# Patient Record
Sex: FEMALE | Race: WHITE | NOT HISPANIC OR LATINO | Employment: UNEMPLOYED | URBAN - METROPOLITAN AREA
[De-identification: names, ages, dates, MRNs, and addresses within clinical notes are randomized per-mention and may not be internally consistent; named-entity substitution may affect disease eponyms.]

---

## 2019-01-01 ENCOUNTER — APPOINTMENT (OUTPATIENT)
Dept: CARDIOLOGY | Facility: MEDICAL CENTER | Age: 64
DRG: 189 | End: 2019-01-01
Attending: STUDENT IN AN ORGANIZED HEALTH CARE EDUCATION/TRAINING PROGRAM
Payer: COMMERCIAL

## 2019-01-01 ENCOUNTER — HOSPITAL ENCOUNTER (OUTPATIENT)
Dept: LAB | Facility: MEDICAL CENTER | Age: 64
End: 2019-12-26
Attending: INTERNAL MEDICINE
Payer: COMMERCIAL

## 2019-01-01 ENCOUNTER — TELEPHONE (OUTPATIENT)
Dept: MEDICAL GROUP | Facility: PHYSICIAN GROUP | Age: 64
End: 2019-01-01

## 2019-01-01 ENCOUNTER — OFFICE VISIT (OUTPATIENT)
Dept: MEDICAL GROUP | Facility: PHYSICIAN GROUP | Age: 64
End: 2019-01-01
Payer: COMMERCIAL

## 2019-01-01 ENCOUNTER — APPOINTMENT (OUTPATIENT)
Dept: RADIOLOGY | Facility: MEDICAL CENTER | Age: 64
DRG: 189 | End: 2019-01-01
Payer: COMMERCIAL

## 2019-01-01 ENCOUNTER — APPOINTMENT (OUTPATIENT)
Dept: RADIOLOGY | Facility: MEDICAL CENTER | Age: 64
DRG: 189 | End: 2019-01-01
Attending: EMERGENCY MEDICINE
Payer: COMMERCIAL

## 2019-01-01 ENCOUNTER — HOSPITAL ENCOUNTER (OUTPATIENT)
Facility: MEDICAL CENTER | Age: 64
End: 2019-12-26
Attending: INTERNAL MEDICINE
Payer: COMMERCIAL

## 2019-01-01 ENCOUNTER — HOSPITAL ENCOUNTER (INPATIENT)
Facility: MEDICAL CENTER | Age: 64
LOS: 6 days | DRG: 189 | End: 2019-11-21
Attending: EMERGENCY MEDICINE | Admitting: INTERNAL MEDICINE
Payer: COMMERCIAL

## 2019-01-01 ENCOUNTER — TELEPHONE (OUTPATIENT)
Dept: URGENT CARE | Facility: PHYSICIAN GROUP | Age: 64
End: 2019-01-01

## 2019-01-01 VITALS
HEIGHT: 68 IN | BODY MASS INDEX: 31.14 KG/M2 | WEIGHT: 205.47 LBS | OXYGEN SATURATION: 91 % | RESPIRATION RATE: 18 BRPM | DIASTOLIC BLOOD PRESSURE: 62 MMHG | SYSTOLIC BLOOD PRESSURE: 102 MMHG | HEART RATE: 94 BPM | TEMPERATURE: 98.9 F

## 2019-01-01 VITALS
BODY MASS INDEX: 31.83 KG/M2 | SYSTOLIC BLOOD PRESSURE: 110 MMHG | TEMPERATURE: 97.7 F | WEIGHT: 210 LBS | DIASTOLIC BLOOD PRESSURE: 64 MMHG | OXYGEN SATURATION: 93 % | HEART RATE: 94 BPM | HEIGHT: 68 IN

## 2019-01-01 DIAGNOSIS — J98.4 CAVITATING MASS IN LEFT LOWER LUNG LOBE: ICD-10-CM

## 2019-01-01 DIAGNOSIS — R09.02 HYPOXIA: ICD-10-CM

## 2019-01-01 DIAGNOSIS — Z23 NEED FOR VACCINATION: ICD-10-CM

## 2019-01-01 DIAGNOSIS — R79.89 LOW VITAMIN D LEVEL: ICD-10-CM

## 2019-01-01 DIAGNOSIS — J44.9 CHRONIC OBSTRUCTIVE PULMONARY DISEASE, UNSPECIFIED COPD TYPE (HCC): ICD-10-CM

## 2019-01-01 DIAGNOSIS — R79.89 ELEVATED BRAIN NATRIURETIC PEPTIDE (BNP) LEVEL: ICD-10-CM

## 2019-01-01 DIAGNOSIS — Z12.11 SCREEN FOR COLON CANCER: ICD-10-CM

## 2019-01-01 DIAGNOSIS — D53.9 MACROCYTIC ANEMIA: ICD-10-CM

## 2019-01-01 DIAGNOSIS — R73.9 HYPERGLYCEMIA: ICD-10-CM

## 2019-01-01 DIAGNOSIS — C34.92 PRIMARY MALIGNANT NEOPLASM OF LEFT LUNG METASTATIC TO OTHER SITE (HCC): ICD-10-CM

## 2019-01-01 DIAGNOSIS — Z13.1 DIABETES MELLITUS SCREENING: ICD-10-CM

## 2019-01-01 DIAGNOSIS — J96.01 ACUTE RESPIRATORY FAILURE WITH HYPOXIA (HCC): ICD-10-CM

## 2019-01-01 DIAGNOSIS — J43.8 OTHER EMPHYSEMA (HCC): ICD-10-CM

## 2019-01-01 DIAGNOSIS — I27.20 PULMONARY HTN (HCC): ICD-10-CM

## 2019-01-01 LAB
25(OH)D3 SERPL-MCNC: 23 NG/ML (ref 30–100)
25(OH)D3 SERPL-MCNC: 25 NG/ML (ref 30–100)
ALBUMIN SERPL BCP-MCNC: 3.6 G/DL (ref 3.2–4.9)
ALBUMIN SERPL BCP-MCNC: 3.7 G/DL (ref 3.2–4.9)
ALBUMIN/GLOB SERPL: 1.2 G/DL
ALBUMIN/GLOB SERPL: 1.4 G/DL
ALP SERPL-CCNC: 69 U/L (ref 30–99)
ALP SERPL-CCNC: 70 U/L (ref 30–99)
ALT SERPL-CCNC: 15 U/L (ref 2–50)
ALT SERPL-CCNC: 15 U/L (ref 2–50)
ANION GAP SERPL CALC-SCNC: 10 MMOL/L (ref 0–11.9)
ANION GAP SERPL CALC-SCNC: 12 MMOL/L (ref 0–11.9)
ANION GAP SERPL CALC-SCNC: 5 MMOL/L (ref 0–11.9)
ANION GAP SERPL CALC-SCNC: 7 MMOL/L (ref 0–11.9)
ANION GAP SERPL CALC-SCNC: 7 MMOL/L (ref 0–11.9)
ANION GAP SERPL CALC-SCNC: 9 MMOL/L (ref 0–11.9)
ANION GAP SERPL CALC-SCNC: 9 MMOL/L (ref 0–11.9)
ANISOCYTOSIS BLD QL SMEAR: ABNORMAL
ANISOCYTOSIS BLD QL SMEAR: ABNORMAL
AST SERPL-CCNC: 16 U/L (ref 12–45)
AST SERPL-CCNC: 24 U/L (ref 12–45)
BACTERIA BLD CULT: NORMAL
BACTERIA BLD CULT: NORMAL
BASO STIPL BLD QL SMEAR: NORMAL
BASOPHILS # BLD AUTO: 0 % (ref 0–1.8)
BASOPHILS # BLD AUTO: 0.4 % (ref 0–1.8)
BASOPHILS # BLD AUTO: 0.5 % (ref 0–1.8)
BASOPHILS # BLD AUTO: 0.7 % (ref 0–1.8)
BASOPHILS # BLD AUTO: 1.7 % (ref 0–1.8)
BASOPHILS # BLD: 0 K/UL (ref 0–0.12)
BASOPHILS # BLD: 0.02 K/UL (ref 0–0.12)
BASOPHILS # BLD: 0.02 K/UL (ref 0–0.12)
BASOPHILS # BLD: 0.04 K/UL (ref 0–0.12)
BASOPHILS # BLD: 0.08 K/UL (ref 0–0.12)
BILIRUB SERPL-MCNC: 0.4 MG/DL (ref 0.1–1.5)
BILIRUB SERPL-MCNC: 0.4 MG/DL (ref 0.1–1.5)
BUN SERPL-MCNC: 11 MG/DL (ref 8–22)
BUN SERPL-MCNC: 11 MG/DL (ref 8–22)
BUN SERPL-MCNC: 15 MG/DL (ref 8–22)
BUN SERPL-MCNC: 16 MG/DL (ref 8–22)
BUN SERPL-MCNC: 19 MG/DL (ref 8–22)
BUN SERPL-MCNC: 26 MG/DL (ref 8–22)
BUN SERPL-MCNC: 9 MG/DL (ref 8–22)
CALCIUM SERPL-MCNC: 8.5 MG/DL (ref 8.5–10.5)
CALCIUM SERPL-MCNC: 8.6 MG/DL (ref 8.5–10.5)
CALCIUM SERPL-MCNC: 8.7 MG/DL (ref 8.5–10.5)
CALCIUM SERPL-MCNC: 9 MG/DL (ref 8.5–10.5)
CALCIUM SERPL-MCNC: 9 MG/DL (ref 8.5–10.5)
CALCIUM SERPL-MCNC: 9.5 MG/DL (ref 8.5–10.5)
CALCIUM SERPL-MCNC: 9.6 MG/DL (ref 8.5–10.5)
CHLORIDE SERPL-SCNC: 102 MMOL/L (ref 96–112)
CHLORIDE SERPL-SCNC: 103 MMOL/L (ref 96–112)
CHLORIDE SERPL-SCNC: 105 MMOL/L (ref 96–112)
CHLORIDE SERPL-SCNC: 106 MMOL/L (ref 96–112)
CHLORIDE SERPL-SCNC: 95 MMOL/L (ref 96–112)
CHLORIDE SERPL-SCNC: 97 MMOL/L (ref 96–112)
CHLORIDE SERPL-SCNC: 99 MMOL/L (ref 96–112)
CO2 SERPL-SCNC: 25 MMOL/L (ref 20–33)
CO2 SERPL-SCNC: 26 MMOL/L (ref 20–33)
CO2 SERPL-SCNC: 29 MMOL/L (ref 20–33)
CO2 SERPL-SCNC: 32 MMOL/L (ref 20–33)
CO2 SERPL-SCNC: 34 MMOL/L (ref 20–33)
CO2 SERPL-SCNC: 34 MMOL/L (ref 20–33)
CO2 SERPL-SCNC: 35 MMOL/L (ref 20–33)
CREAT SERPL-MCNC: 0.55 MG/DL (ref 0.5–1.4)
CREAT SERPL-MCNC: 0.59 MG/DL (ref 0.5–1.4)
CREAT SERPL-MCNC: 0.6 MG/DL (ref 0.5–1.4)
CREAT SERPL-MCNC: 0.61 MG/DL (ref 0.5–1.4)
CREAT SERPL-MCNC: 0.65 MG/DL (ref 0.5–1.4)
CREAT SERPL-MCNC: 0.71 MG/DL (ref 0.5–1.4)
CREAT SERPL-MCNC: 0.74 MG/DL (ref 0.5–1.4)
EKG IMPRESSION: NORMAL
EOSINOPHIL # BLD AUTO: 0 K/UL (ref 0–0.51)
EOSINOPHIL # BLD AUTO: 0.01 K/UL (ref 0–0.51)
EOSINOPHIL # BLD AUTO: 0.01 K/UL (ref 0–0.51)
EOSINOPHIL NFR BLD: 0 % (ref 0–6.9)
EOSINOPHIL NFR BLD: 0.2 % (ref 0–6.9)
EOSINOPHIL NFR BLD: 0.2 % (ref 0–6.9)
ERYTHROCYTE [DISTWIDTH] IN BLOOD BY AUTOMATED COUNT: 54.6 FL (ref 35.9–50)
ERYTHROCYTE [DISTWIDTH] IN BLOOD BY AUTOMATED COUNT: 58.1 FL (ref 35.9–50)
ERYTHROCYTE [DISTWIDTH] IN BLOOD BY AUTOMATED COUNT: 58.2 FL (ref 35.9–50)
ERYTHROCYTE [DISTWIDTH] IN BLOOD BY AUTOMATED COUNT: 61.1 FL (ref 35.9–50)
ERYTHROCYTE [DISTWIDTH] IN BLOOD BY AUTOMATED COUNT: 66.8 FL (ref 35.9–50)
ERYTHROCYTE [DISTWIDTH] IN BLOOD BY AUTOMATED COUNT: 67.3 FL (ref 35.9–50)
EST. AVERAGE GLUCOSE BLD GHB EST-MCNC: 88 MG/DL
EST. AVERAGE GLUCOSE BLD GHB EST-MCNC: 97 MG/DL
FLUAV RNA SPEC QL NAA+PROBE: NEGATIVE
FLUBV RNA SPEC QL NAA+PROBE: NEGATIVE
FOLATE SERPL-MCNC: 10.4 NG/ML
FOLATE SERPL-MCNC: 10.7 NG/ML
GLOBULIN SER CALC-MCNC: 2.5 G/DL (ref 1.9–3.5)
GLOBULIN SER CALC-MCNC: 3.1 G/DL (ref 1.9–3.5)
GLUCOSE SERPL-MCNC: 105 MG/DL (ref 65–99)
GLUCOSE SERPL-MCNC: 106 MG/DL (ref 65–99)
GLUCOSE SERPL-MCNC: 106 MG/DL (ref 65–99)
GLUCOSE SERPL-MCNC: 121 MG/DL (ref 65–99)
GLUCOSE SERPL-MCNC: 124 MG/DL (ref 65–99)
GLUCOSE SERPL-MCNC: 144 MG/DL (ref 65–99)
GLUCOSE SERPL-MCNC: 98 MG/DL (ref 65–99)
HBA1C MFR BLD: 4.7 % (ref 0–5.6)
HBA1C MFR BLD: 5 % (ref 0–5.6)
HCT VFR BLD AUTO: 24.2 % (ref 37–47)
HCT VFR BLD AUTO: 24.7 % (ref 37–47)
HCT VFR BLD AUTO: 25.2 % (ref 37–47)
HCT VFR BLD AUTO: 26.9 % (ref 37–47)
HCT VFR BLD AUTO: 30.9 % (ref 37–47)
HCT VFR BLD AUTO: 30.9 % (ref 37–47)
HGB BLD-MCNC: 10.1 G/DL (ref 12–16)
HGB BLD-MCNC: 7.7 G/DL (ref 12–16)
HGB BLD-MCNC: 7.9 G/DL (ref 12–16)
HGB BLD-MCNC: 8 G/DL (ref 12–16)
HGB BLD-MCNC: 8.5 G/DL (ref 12–16)
HGB BLD-MCNC: 9.9 G/DL (ref 12–16)
HGB RETIC QN AUTO: 30 PG/CELL (ref 29–35)
HGB RETIC QN AUTO: 30.4 PG/CELL (ref 29–35)
IMM GRANULOCYTES # BLD AUTO: 0.02 K/UL (ref 0–0.11)
IMM GRANULOCYTES # BLD AUTO: 0.03 K/UL (ref 0–0.11)
IMM GRANULOCYTES # BLD AUTO: 0.07 K/UL (ref 0–0.11)
IMM GRANULOCYTES NFR BLD AUTO: 0.5 % (ref 0–0.9)
IMM GRANULOCYTES NFR BLD AUTO: 0.5 % (ref 0–0.9)
IMM GRANULOCYTES NFR BLD AUTO: 1.3 % (ref 0–0.9)
IMM RETICS NFR: 18.8 % (ref 9.3–17.4)
IMM RETICS NFR: 25.7 % (ref 9.3–17.4)
LACTATE BLD-SCNC: 1.6 MMOL/L (ref 0.5–2)
LV EJECT FRACT MOD 2C 99903: 56.42
LV EJECT FRACT MOD 4C 99902: 60.74
LV EJECT FRACT MOD BP 99901: 56.84
LYMPHOCYTES # BLD AUTO: 0.65 K/UL (ref 1–4.8)
LYMPHOCYTES # BLD AUTO: 0.79 K/UL (ref 1–4.8)
LYMPHOCYTES # BLD AUTO: 0.81 K/UL (ref 1–4.8)
LYMPHOCYTES # BLD AUTO: 0.85 K/UL (ref 1–4.8)
LYMPHOCYTES # BLD AUTO: 0.9 K/UL (ref 1–4.8)
LYMPHOCYTES NFR BLD: 11.6 % (ref 22–41)
LYMPHOCYTES NFR BLD: 12.9 % (ref 22–41)
LYMPHOCYTES NFR BLD: 14.5 % (ref 22–41)
LYMPHOCYTES NFR BLD: 17.4 % (ref 22–41)
LYMPHOCYTES NFR BLD: 18.9 % (ref 22–41)
MANUAL DIFF BLD: NORMAL
MANUAL DIFF BLD: NORMAL
MCH RBC QN AUTO: 32 PG (ref 27–33)
MCH RBC QN AUTO: 32 PG (ref 27–33)
MCH RBC QN AUTO: 32.1 PG (ref 27–33)
MCH RBC QN AUTO: 32.2 PG (ref 27–33)
MCH RBC QN AUTO: 32.5 PG (ref 27–33)
MCH RBC QN AUTO: 32.5 PG (ref 27–33)
MCHC RBC AUTO-ENTMCNC: 31.2 G/DL (ref 33.6–35)
MCHC RBC AUTO-ENTMCNC: 31.6 G/DL (ref 33.6–35)
MCHC RBC AUTO-ENTMCNC: 31.7 G/DL (ref 33.6–35)
MCHC RBC AUTO-ENTMCNC: 32 G/DL (ref 33.6–35)
MCHC RBC AUTO-ENTMCNC: 32.6 G/DL (ref 33.6–35)
MCHC RBC AUTO-ENTMCNC: 32.7 G/DL (ref 33.6–35)
MCV RBC AUTO: 100.7 FL (ref 81.4–97.8)
MCV RBC AUTO: 100.8 FL (ref 81.4–97.8)
MCV RBC AUTO: 101.1 FL (ref 81.4–97.8)
MCV RBC AUTO: 102.9 FL (ref 81.4–97.8)
MCV RBC AUTO: 99.4 FL (ref 81.4–97.8)
MCV RBC AUTO: 99.6 FL (ref 81.4–97.8)
MICROCYTES BLD QL SMEAR: ABNORMAL
MONOCYTES # BLD AUTO: 0.22 K/UL (ref 0–0.85)
MONOCYTES # BLD AUTO: 0.37 K/UL (ref 0–0.85)
MONOCYTES # BLD AUTO: 0.41 K/UL (ref 0–0.85)
MONOCYTES # BLD AUTO: 0.45 K/UL (ref 0–0.85)
MONOCYTES # BLD AUTO: 0.54 K/UL (ref 0–0.85)
MONOCYTES NFR BLD AUTO: 4.4 % (ref 0–13.4)
MONOCYTES NFR BLD AUTO: 6.4 % (ref 0–13.4)
MONOCYTES NFR BLD AUTO: 6.6 % (ref 0–13.4)
MONOCYTES NFR BLD AUTO: 9.6 % (ref 0–13.4)
MONOCYTES NFR BLD AUTO: 9.8 % (ref 0–13.4)
MORPHOLOGY BLD-IMP: NORMAL
MORPHOLOGY BLD-IMP: NORMAL
NEUTROPHILS # BLD AUTO: 2.94 K/UL (ref 2–7.15)
NEUTROPHILS # BLD AUTO: 3.75 K/UL (ref 2–7.15)
NEUTROPHILS # BLD AUTO: 4.15 K/UL (ref 2–7.15)
NEUTROPHILS # BLD AUTO: 4.52 K/UL (ref 2–7.15)
NEUTROPHILS # BLD AUTO: 5.65 K/UL (ref 2–7.15)
NEUTROPHILS NFR BLD: 70.3 % (ref 44–72)
NEUTROPHILS NFR BLD: 74 % (ref 44–72)
NEUTROPHILS NFR BLD: 76.5 % (ref 44–72)
NEUTROPHILS NFR BLD: 80.4 % (ref 44–72)
NEUTROPHILS NFR BLD: 80.7 % (ref 44–72)
NRBC # BLD AUTO: 0 K/UL
NRBC # BLD AUTO: 0.04 K/UL
NRBC BLD-RTO: 0 /100 WBC
NRBC BLD-RTO: 0.7 /100 WBC
NT-PROBNP SERPL IA-MCNC: 235 PG/ML (ref 0–125)
PLATELET # BLD AUTO: 309 K/UL (ref 164–446)
PLATELET # BLD AUTO: 324 K/UL (ref 164–446)
PLATELET # BLD AUTO: 333 K/UL (ref 164–446)
PLATELET # BLD AUTO: 373 K/UL (ref 164–446)
PLATELET # BLD AUTO: 430 K/UL (ref 164–446)
PLATELET # BLD AUTO: 448 K/UL (ref 164–446)
PLATELET BLD QL SMEAR: NORMAL
PLATELET BLD QL SMEAR: NORMAL
PMV BLD AUTO: 9 FL (ref 9–12.9)
PMV BLD AUTO: 9.2 FL (ref 9–12.9)
POLYCHROMASIA BLD QL SMEAR: NORMAL
POLYCHROMASIA BLD QL SMEAR: NORMAL
POTASSIUM SERPL-SCNC: 3.5 MMOL/L (ref 3.6–5.5)
POTASSIUM SERPL-SCNC: 3.8 MMOL/L (ref 3.6–5.5)
POTASSIUM SERPL-SCNC: 3.8 MMOL/L (ref 3.6–5.5)
POTASSIUM SERPL-SCNC: 3.9 MMOL/L (ref 3.6–5.5)
POTASSIUM SERPL-SCNC: 3.9 MMOL/L (ref 3.6–5.5)
POTASSIUM SERPL-SCNC: 4.4 MMOL/L (ref 3.6–5.5)
POTASSIUM SERPL-SCNC: 4.4 MMOL/L (ref 3.6–5.5)
PROCALCITONIN SERPL-MCNC: 0.05 NG/ML
PROCALCITONIN SERPL-MCNC: <0.05 NG/ML
PROT SERPL-MCNC: 6.1 G/DL (ref 6–8.2)
PROT SERPL-MCNC: 6.8 G/DL (ref 6–8.2)
RBC # BLD AUTO: 2.4 M/UL (ref 4.2–5.4)
RBC # BLD AUTO: 2.43 M/UL (ref 4.2–5.4)
RBC # BLD AUTO: 2.5 M/UL (ref 4.2–5.4)
RBC # BLD AUTO: 2.66 M/UL (ref 4.2–5.4)
RBC # BLD AUTO: 3.07 M/UL (ref 4.2–5.4)
RBC # BLD AUTO: 3.11 M/UL (ref 4.2–5.4)
RBC BLD AUTO: PRESENT
RBC BLD AUTO: PRESENT
RETICS # AUTO: 0.06 M/UL (ref 0.04–0.06)
RETICS # AUTO: 0.06 M/UL (ref 0.04–0.06)
RETICS/RBC NFR: 1.7 % (ref 0.8–2.1)
RETICS/RBC NFR: 1.9 % (ref 0.8–2.1)
SIGNIFICANT IND 70042: NORMAL
SIGNIFICANT IND 70042: NORMAL
SITE SITE: NORMAL
SITE SITE: NORMAL
SODIUM SERPL-SCNC: 138 MMOL/L (ref 135–145)
SODIUM SERPL-SCNC: 139 MMOL/L (ref 135–145)
SODIUM SERPL-SCNC: 140 MMOL/L (ref 135–145)
SODIUM SERPL-SCNC: 140 MMOL/L (ref 135–145)
SODIUM SERPL-SCNC: 141 MMOL/L (ref 135–145)
SODIUM SERPL-SCNC: 141 MMOL/L (ref 135–145)
SODIUM SERPL-SCNC: 142 MMOL/L (ref 135–145)
SOURCE SOURCE: NORMAL
SOURCE SOURCE: NORMAL
T4 SERPL-MCNC: 6.9 UG/DL (ref 4–12)
TSH SERPL DL<=0.005 MIU/L-ACNC: 1.33 UIU/ML (ref 0.38–5.33)
VIT B12 SERPL-MCNC: 309 PG/ML (ref 211–911)
VIT B12 SERPL-MCNC: 354 PG/ML (ref 211–911)
WBC # BLD AUTO: 4.2 K/UL (ref 4.8–10.8)
WBC # BLD AUTO: 4.9 K/UL (ref 4.8–10.8)
WBC # BLD AUTO: 5 K/UL (ref 4.8–10.8)
WBC # BLD AUTO: 5.6 K/UL (ref 4.8–10.8)
WBC # BLD AUTO: 5.6 K/UL (ref 4.8–10.8)
WBC # BLD AUTO: 7 K/UL (ref 4.8–10.8)

## 2019-01-01 PROCEDURE — A9270 NON-COVERED ITEM OR SERVICE: HCPCS | Performed by: STUDENT IN AN ORGANIZED HEALTH CARE EDUCATION/TRAINING PROGRAM

## 2019-01-01 PROCEDURE — A9270 NON-COVERED ITEM OR SERVICE: HCPCS | Performed by: INTERNAL MEDICINE

## 2019-01-01 PROCEDURE — 99232 SBSQ HOSP IP/OBS MODERATE 35: CPT | Performed by: INTERNAL MEDICINE

## 2019-01-01 PROCEDURE — 99285 EMERGENCY DEPT VISIT HI MDM: CPT

## 2019-01-01 PROCEDURE — 93306 TTE W/DOPPLER COMPLETE: CPT | Mod: 26 | Performed by: INTERNAL MEDICINE

## 2019-01-01 PROCEDURE — 90472 IMMUNIZATION ADMIN EACH ADD: CPT | Performed by: INTERNAL MEDICINE

## 2019-01-01 PROCEDURE — 85025 COMPLETE CBC W/AUTO DIFF WBC: CPT

## 2019-01-01 PROCEDURE — 90732 PPSV23 VACC 2 YRS+ SUBQ/IM: CPT | Performed by: INTERNAL MEDICINE

## 2019-01-01 PROCEDURE — 770004 HCHG ROOM/CARE - ONCOLOGY PRIVATE *

## 2019-01-01 PROCEDURE — 82746 ASSAY OF FOLIC ACID SERUM: CPT

## 2019-01-01 PROCEDURE — 700111 HCHG RX REV CODE 636 W/ 250 OVERRIDE (IP): Performed by: INTERNAL MEDICINE

## 2019-01-01 PROCEDURE — 700117 HCHG RX CONTRAST REV CODE 255: Performed by: EMERGENCY MEDICINE

## 2019-01-01 PROCEDURE — 83036 HEMOGLOBIN GLYCOSYLATED A1C: CPT | Mod: 91

## 2019-01-01 PROCEDURE — 36415 COLL VENOUS BLD VENIPUNCTURE: CPT

## 2019-01-01 PROCEDURE — 99204 OFFICE O/P NEW MOD 45 MIN: CPT | Mod: 25 | Performed by: INTERNAL MEDICINE

## 2019-01-01 PROCEDURE — 700111 HCHG RX REV CODE 636 W/ 250 OVERRIDE (IP): Performed by: STUDENT IN AN ORGANIZED HEALTH CARE EDUCATION/TRAINING PROGRAM

## 2019-01-01 PROCEDURE — 84145 PROCALCITONIN (PCT): CPT

## 2019-01-01 PROCEDURE — 96376 TX/PRO/DX INJ SAME DRUG ADON: CPT

## 2019-01-01 PROCEDURE — 93306 TTE W/DOPPLER COMPLETE: CPT

## 2019-01-01 PROCEDURE — 90686 IIV4 VACC NO PRSV 0.5 ML IM: CPT | Performed by: INTERNAL MEDICINE

## 2019-01-01 PROCEDURE — 83880 ASSAY OF NATRIURETIC PEPTIDE: CPT

## 2019-01-01 PROCEDURE — 94664 DEMO&/EVAL PT USE INHALER: CPT

## 2019-01-01 PROCEDURE — 700101 HCHG RX REV CODE 250: Performed by: INTERNAL MEDICINE

## 2019-01-01 PROCEDURE — 84436 ASSAY OF TOTAL THYROXINE: CPT

## 2019-01-01 PROCEDURE — 80048 BASIC METABOLIC PNL TOTAL CA: CPT

## 2019-01-01 PROCEDURE — 94760 N-INVAS EAR/PLS OXIMETRY 1: CPT

## 2019-01-01 PROCEDURE — 99233 SBSQ HOSP IP/OBS HIGH 50: CPT | Performed by: STUDENT IN AN ORGANIZED HEALTH CARE EDUCATION/TRAINING PROGRAM

## 2019-01-01 PROCEDURE — 700102 HCHG RX REV CODE 250 W/ 637 OVERRIDE(OP): Performed by: STUDENT IN AN ORGANIZED HEALTH CARE EDUCATION/TRAINING PROGRAM

## 2019-01-01 PROCEDURE — 71045 X-RAY EXAM CHEST 1 VIEW: CPT

## 2019-01-01 PROCEDURE — 85007 BL SMEAR W/DIFF WBC COUNT: CPT

## 2019-01-01 PROCEDURE — 96374 THER/PROPH/DIAG INJ IV PUSH: CPT

## 2019-01-01 PROCEDURE — 85027 COMPLETE CBC AUTOMATED: CPT

## 2019-01-01 PROCEDURE — 80053 COMPREHEN METABOLIC PANEL: CPT

## 2019-01-01 PROCEDURE — 99239 HOSP IP/OBS DSCHRG MGMT >30: CPT | Performed by: INTERNAL MEDICINE

## 2019-01-01 PROCEDURE — 700102 HCHG RX REV CODE 250 W/ 637 OVERRIDE(OP): Performed by: INTERNAL MEDICINE

## 2019-01-01 PROCEDURE — 96375 TX/PRO/DX INJ NEW DRUG ADDON: CPT

## 2019-01-01 PROCEDURE — 71275 CT ANGIOGRAPHY CHEST: CPT

## 2019-01-01 PROCEDURE — 82306 VITAMIN D 25 HYDROXY: CPT

## 2019-01-01 PROCEDURE — 82607 VITAMIN B-12: CPT

## 2019-01-01 PROCEDURE — 99223 1ST HOSP IP/OBS HIGH 75: CPT | Mod: AI | Performed by: INTERNAL MEDICINE

## 2019-01-01 PROCEDURE — 83036 HEMOGLOBIN GLYCOSYLATED A1C: CPT

## 2019-01-01 PROCEDURE — 82306 VITAMIN D 25 HYDROXY: CPT | Mod: 91

## 2019-01-01 PROCEDURE — 85046 RETICYTE/HGB CONCENTRATE: CPT

## 2019-01-01 PROCEDURE — 87040 BLOOD CULTURE FOR BACTERIA: CPT

## 2019-01-01 PROCEDURE — 82746 ASSAY OF FOLIC ACID SERUM: CPT | Mod: 91

## 2019-01-01 PROCEDURE — 90471 IMMUNIZATION ADMIN: CPT | Performed by: INTERNAL MEDICINE

## 2019-01-01 PROCEDURE — 99233 SBSQ HOSP IP/OBS HIGH 50: CPT | Performed by: INTERNAL MEDICINE

## 2019-01-01 PROCEDURE — 700111 HCHG RX REV CODE 636 W/ 250 OVERRIDE (IP): Performed by: EMERGENCY MEDICINE

## 2019-01-01 PROCEDURE — 93005 ELECTROCARDIOGRAM TRACING: CPT | Performed by: EMERGENCY MEDICINE

## 2019-01-01 PROCEDURE — 85046 RETICYTE/HGB CONCENTRATE: CPT | Mod: 91

## 2019-01-01 PROCEDURE — 84443 ASSAY THYROID STIM HORMONE: CPT

## 2019-01-01 PROCEDURE — 83605 ASSAY OF LACTIC ACID: CPT

## 2019-01-01 PROCEDURE — 82607 VITAMIN B-12: CPT | Mod: 91

## 2019-01-01 PROCEDURE — 87502 INFLUENZA DNA AMP PROBE: CPT

## 2019-01-01 RX ORDER — PROMETHAZINE HYDROCHLORIDE 25 MG/1
12.5-25 SUPPOSITORY RECTAL EVERY 4 HOURS PRN
Status: DISCONTINUED | OUTPATIENT
Start: 2019-01-01 | End: 2019-01-01 | Stop reason: HOSPADM

## 2019-01-01 RX ORDER — BISACODYL 10 MG
10 SUPPOSITORY, RECTAL RECTAL ONCE
Status: COMPLETED | OUTPATIENT
Start: 2019-01-01 | End: 2019-01-01

## 2019-01-01 RX ORDER — MORPHINE SULFATE 4 MG/ML
4 INJECTION, SOLUTION INTRAMUSCULAR; INTRAVENOUS ONCE
Status: COMPLETED | OUTPATIENT
Start: 2019-01-01 | End: 2019-01-01

## 2019-01-01 RX ORDER — TRAMADOL HYDROCHLORIDE 50 MG/1
50 TABLET ORAL EVERY 4 HOURS PRN
COMMUNITY
End: 2019-01-01

## 2019-01-01 RX ORDER — ONDANSETRON 4 MG/1
4 TABLET, ORALLY DISINTEGRATING ORAL EVERY 4 HOURS PRN
Status: DISCONTINUED | OUTPATIENT
Start: 2019-01-01 | End: 2019-01-01 | Stop reason: HOSPADM

## 2019-01-01 RX ORDER — ENALAPRILAT 1.25 MG/ML
1.25 INJECTION INTRAVENOUS EVERY 6 HOURS PRN
Status: DISCONTINUED | OUTPATIENT
Start: 2019-01-01 | End: 2019-01-01 | Stop reason: HOSPADM

## 2019-01-01 RX ORDER — PROCHLORPERAZINE EDISYLATE 5 MG/ML
5-10 INJECTION INTRAMUSCULAR; INTRAVENOUS EVERY 4 HOURS PRN
Status: DISCONTINUED | OUTPATIENT
Start: 2019-01-01 | End: 2019-01-01 | Stop reason: HOSPADM

## 2019-01-01 RX ORDER — FUROSEMIDE 10 MG/ML
40 INJECTION INTRAMUSCULAR; INTRAVENOUS
Status: DISCONTINUED | OUTPATIENT
Start: 2019-01-01 | End: 2019-01-01

## 2019-01-01 RX ORDER — ONDANSETRON 2 MG/ML
4 INJECTION INTRAMUSCULAR; INTRAVENOUS ONCE
Status: COMPLETED | OUTPATIENT
Start: 2019-01-01 | End: 2019-01-01

## 2019-01-01 RX ORDER — FUROSEMIDE 10 MG/ML
20 INJECTION INTRAMUSCULAR; INTRAVENOUS
Status: DISCONTINUED | OUTPATIENT
Start: 2019-01-01 | End: 2019-01-01

## 2019-01-01 RX ORDER — OXYCODONE HYDROCHLORIDE 5 MG/1
5 TABLET ORAL
Status: DISCONTINUED | OUTPATIENT
Start: 2019-01-01 | End: 2019-01-01 | Stop reason: HOSPADM

## 2019-01-01 RX ORDER — POLYETHYLENE GLYCOL 3350 17 G/17G
1 POWDER, FOR SOLUTION ORAL
Status: DISCONTINUED | OUTPATIENT
Start: 2019-01-01 | End: 2019-01-01 | Stop reason: HOSPADM

## 2019-01-01 RX ORDER — OXYCODONE HYDROCHLORIDE 10 MG/1
10 TABLET ORAL
Status: DISCONTINUED | OUTPATIENT
Start: 2019-01-01 | End: 2019-01-01 | Stop reason: HOSPADM

## 2019-01-01 RX ORDER — OXYCODONE HYDROCHLORIDE 5 MG/1
5 TABLET ORAL EVERY 6 HOURS PRN
COMMUNITY
End: 2020-01-01

## 2019-01-01 RX ORDER — BISACODYL 10 MG
10 SUPPOSITORY, RECTAL RECTAL
Status: DISCONTINUED | OUTPATIENT
Start: 2019-01-01 | End: 2019-01-01 | Stop reason: HOSPADM

## 2019-01-01 RX ORDER — HYDROMORPHONE HYDROCHLORIDE 1 MG/ML
0.5 INJECTION, SOLUTION INTRAMUSCULAR; INTRAVENOUS; SUBCUTANEOUS
Status: DISCONTINUED | OUTPATIENT
Start: 2019-01-01 | End: 2019-01-01 | Stop reason: HOSPADM

## 2019-01-01 RX ORDER — CALCIUM CARBONATE 300MG(750)
800 TABLET,CHEWABLE ORAL DAILY
COMMUNITY

## 2019-01-01 RX ORDER — ONDANSETRON 2 MG/ML
4 INJECTION INTRAMUSCULAR; INTRAVENOUS EVERY 4 HOURS PRN
Status: DISCONTINUED | OUTPATIENT
Start: 2019-01-01 | End: 2019-01-01 | Stop reason: HOSPADM

## 2019-01-01 RX ORDER — PROMETHAZINE HYDROCHLORIDE 25 MG/1
12.5-25 TABLET ORAL EVERY 4 HOURS PRN
Status: DISCONTINUED | OUTPATIENT
Start: 2019-01-01 | End: 2019-01-01 | Stop reason: HOSPADM

## 2019-01-01 RX ORDER — AMOXICILLIN 250 MG
2 CAPSULE ORAL 2 TIMES DAILY
Status: DISCONTINUED | OUTPATIENT
Start: 2019-01-01 | End: 2019-01-01 | Stop reason: HOSPADM

## 2019-01-01 RX ORDER — TIOTROPIUM BROMIDE 18 UG/1
18 CAPSULE ORAL; RESPIRATORY (INHALATION) DAILY
COMMUNITY
End: 2020-01-01

## 2019-01-01 RX ORDER — GUAIFENESIN/DEXTROMETHORPHAN 100-10MG/5
10 SYRUP ORAL EVERY 6 HOURS PRN
Status: DISCONTINUED | OUTPATIENT
Start: 2019-01-01 | End: 2019-01-01 | Stop reason: HOSPADM

## 2019-01-01 RX ORDER — ACETAMINOPHEN 325 MG/1
650 TABLET ORAL EVERY 6 HOURS PRN
Status: DISCONTINUED | OUTPATIENT
Start: 2019-01-01 | End: 2019-01-01 | Stop reason: HOSPADM

## 2019-01-01 RX ORDER — POTASSIUM CHLORIDE 20 MEQ/1
20 TABLET, EXTENDED RELEASE ORAL 2 TIMES DAILY
Status: DISCONTINUED | OUTPATIENT
Start: 2019-01-01 | End: 2019-01-01 | Stop reason: HOSPADM

## 2019-01-01 RX ORDER — SODIUM CHLORIDE AND POTASSIUM CHLORIDE 150; 900 MG/100ML; MG/100ML
INJECTION, SOLUTION INTRAVENOUS CONTINUOUS
Status: DISCONTINUED | OUTPATIENT
Start: 2019-01-01 | End: 2019-01-01

## 2019-01-01 RX ADMIN — FUROSEMIDE 20 MG: 10 INJECTION, SOLUTION INTRAVENOUS at 06:58

## 2019-01-01 RX ADMIN — SENNOSIDES AND DOCUSATE SODIUM 2 TABLET: 8.6; 5 TABLET ORAL at 05:51

## 2019-01-01 RX ADMIN — FUROSEMIDE 20 MG: 10 INJECTION, SOLUTION INTRAVENOUS at 10:56

## 2019-01-01 RX ADMIN — POTASSIUM CHLORIDE 20 MEQ: 20 TABLET, EXTENDED RELEASE ORAL at 18:25

## 2019-01-01 RX ADMIN — LIDOCAINE HYDROCHLORIDE 15 ML: 20 SOLUTION OROPHARYNGEAL at 13:19

## 2019-01-01 RX ADMIN — MORPHINE SULFATE 4 MG: 4 INJECTION INTRAVENOUS at 19:23

## 2019-01-01 RX ADMIN — POTASSIUM CHLORIDE AND SODIUM CHLORIDE: 900; 150 INJECTION, SOLUTION INTRAVENOUS at 21:14

## 2019-01-01 RX ADMIN — OXYCODONE HYDROCHLORIDE 10 MG: 10 TABLET ORAL at 08:57

## 2019-01-01 RX ADMIN — ENOXAPARIN SODIUM 40 MG: 100 INJECTION SUBCUTANEOUS at 05:55

## 2019-01-01 RX ADMIN — POTASSIUM CHLORIDE 20 MEQ: 20 TABLET, EXTENDED RELEASE ORAL at 17:02

## 2019-01-01 RX ADMIN — LIDOCAINE HYDROCHLORIDE 15 ML: 20 SOLUTION OROPHARYNGEAL at 15:56

## 2019-01-01 RX ADMIN — BISACODYL 10 MG: 10 SUPPOSITORY RECTAL at 18:26

## 2019-01-01 RX ADMIN — OXYCODONE HYDROCHLORIDE 10 MG: 10 TABLET ORAL at 05:35

## 2019-01-01 RX ADMIN — SENNOSIDES AND DOCUSATE SODIUM 2 TABLET: 8.6; 5 TABLET ORAL at 18:20

## 2019-01-01 RX ADMIN — OXYCODONE HYDROCHLORIDE 10 MG: 10 TABLET ORAL at 05:50

## 2019-01-01 RX ADMIN — SENNOSIDES AND DOCUSATE SODIUM 2 TABLET: 8.6; 5 TABLET ORAL at 05:36

## 2019-01-01 RX ADMIN — FUROSEMIDE 40 MG: 10 INJECTION, SOLUTION INTRAMUSCULAR; INTRAVENOUS at 18:21

## 2019-01-01 RX ADMIN — OXYCODONE HYDROCHLORIDE 5 MG: 5 TABLET ORAL at 00:52

## 2019-01-01 RX ADMIN — OXYCODONE HYDROCHLORIDE 10 MG: 10 TABLET ORAL at 17:02

## 2019-01-01 RX ADMIN — SENNOSIDES AND DOCUSATE SODIUM 2 TABLET: 8.6; 5 TABLET ORAL at 21:51

## 2019-01-01 RX ADMIN — FUROSEMIDE 40 MG: 10 INJECTION, SOLUTION INTRAMUSCULAR; INTRAVENOUS at 06:13

## 2019-01-01 RX ADMIN — ENOXAPARIN SODIUM 40 MG: 100 INJECTION SUBCUTANEOUS at 06:00

## 2019-01-01 RX ADMIN — LIDOCAINE HYDROCHLORIDE 15 ML: 20 SOLUTION OROPHARYNGEAL at 08:43

## 2019-01-01 RX ADMIN — SENNOSIDES AND DOCUSATE SODIUM 2 TABLET: 8.6; 5 TABLET ORAL at 18:04

## 2019-01-01 RX ADMIN — OXYCODONE HYDROCHLORIDE 10 MG: 10 TABLET ORAL at 08:47

## 2019-01-01 RX ADMIN — OXYCODONE HYDROCHLORIDE 10 MG: 10 TABLET ORAL at 06:13

## 2019-01-01 RX ADMIN — OXYCODONE HYDROCHLORIDE 10 MG: 10 TABLET ORAL at 21:52

## 2019-01-01 RX ADMIN — OXYCODONE HYDROCHLORIDE 10 MG: 10 TABLET ORAL at 16:15

## 2019-01-01 RX ADMIN — FUROSEMIDE 20 MG: 10 INJECTION, SOLUTION INTRAVENOUS at 16:29

## 2019-01-01 RX ADMIN — SENNOSIDES AND DOCUSATE SODIUM 2 TABLET: 8.6; 5 TABLET ORAL at 23:19

## 2019-01-01 RX ADMIN — POTASSIUM CHLORIDE 20 MEQ: 20 TABLET, EXTENDED RELEASE ORAL at 05:42

## 2019-01-01 RX ADMIN — LIDOCAINE HYDROCHLORIDE 15 ML: 20 SOLUTION OROPHARYNGEAL at 17:42

## 2019-01-01 RX ADMIN — SENNOSIDES AND DOCUSATE SODIUM 2 TABLET: 8.6; 5 TABLET ORAL at 05:42

## 2019-01-01 RX ADMIN — OXYCODONE HYDROCHLORIDE 10 MG: 10 TABLET ORAL at 14:16

## 2019-01-01 RX ADMIN — OXYCODONE HYDROCHLORIDE 10 MG: 10 TABLET ORAL at 21:25

## 2019-01-01 RX ADMIN — ENOXAPARIN SODIUM 40 MG: 100 INJECTION SUBCUTANEOUS at 06:59

## 2019-01-01 RX ADMIN — OXYCODONE HYDROCHLORIDE 10 MG: 10 TABLET ORAL at 18:03

## 2019-01-01 RX ADMIN — OXYCODONE HYDROCHLORIDE 10 MG: 10 TABLET ORAL at 10:56

## 2019-01-01 RX ADMIN — FUROSEMIDE 40 MG: 10 INJECTION, SOLUTION INTRAMUSCULAR; INTRAVENOUS at 05:43

## 2019-01-01 RX ADMIN — OXYCODONE HYDROCHLORIDE 10 MG: 10 TABLET ORAL at 12:54

## 2019-01-01 RX ADMIN — POLYETHYLENE GLYCOL 3350 1 PACKET: 17 POWDER, FOR SOLUTION ORAL at 06:13

## 2019-01-01 RX ADMIN — ONDANSETRON 4 MG: 2 INJECTION INTRAMUSCULAR; INTRAVENOUS at 16:42

## 2019-01-01 RX ADMIN — SENNOSIDES AND DOCUSATE SODIUM 2 TABLET: 8.6; 5 TABLET ORAL at 17:42

## 2019-01-01 RX ADMIN — FUROSEMIDE 20 MG: 10 INJECTION, SOLUTION INTRAVENOUS at 15:56

## 2019-01-01 RX ADMIN — OXYCODONE HYDROCHLORIDE 10 MG: 10 TABLET ORAL at 22:12

## 2019-01-01 RX ADMIN — MAGNESIUM HYDROXIDE 30 ML: 400 SUSPENSION ORAL at 08:47

## 2019-01-01 RX ADMIN — OXYCODONE HYDROCHLORIDE 10 MG: 10 TABLET ORAL at 21:56

## 2019-01-01 RX ADMIN — PROCHLORPERAZINE EDISYLATE 10 MG: 5 INJECTION INTRAMUSCULAR; INTRAVENOUS at 06:33

## 2019-01-01 RX ADMIN — POTASSIUM CHLORIDE AND SODIUM CHLORIDE: 900; 150 INJECTION, SOLUTION INTRAVENOUS at 05:50

## 2019-01-01 RX ADMIN — SENNOSIDES AND DOCUSATE SODIUM 2 TABLET: 8.6; 5 TABLET ORAL at 06:13

## 2019-01-01 RX ADMIN — POTASSIUM CHLORIDE 20 MEQ: 20 TABLET, EXTENDED RELEASE ORAL at 06:00

## 2019-01-01 RX ADMIN — SENNOSIDES AND DOCUSATE SODIUM 2 TABLET: 8.6; 5 TABLET ORAL at 06:59

## 2019-01-01 RX ADMIN — POTASSIUM CHLORIDE 20 MEQ: 20 TABLET, EXTENDED RELEASE ORAL at 18:03

## 2019-01-01 RX ADMIN — POLYETHYLENE GLYCOL 3350 1 PACKET: 17 POWDER, FOR SOLUTION ORAL at 20:25

## 2019-01-01 RX ADMIN — POTASSIUM CHLORIDE 20 MEQ: 20 TABLET, EXTENDED RELEASE ORAL at 17:42

## 2019-01-01 RX ADMIN — POTASSIUM CHLORIDE 20 MEQ: 20 TABLET, EXTENDED RELEASE ORAL at 10:57

## 2019-01-01 RX ADMIN — SENNOSIDES AND DOCUSATE SODIUM 2 TABLET: 8.6; 5 TABLET ORAL at 17:03

## 2019-01-01 RX ADMIN — ENOXAPARIN SODIUM 40 MG: 100 INJECTION SUBCUTANEOUS at 05:37

## 2019-01-01 RX ADMIN — ENOXAPARIN SODIUM 40 MG: 100 INJECTION SUBCUTANEOUS at 05:42

## 2019-01-01 RX ADMIN — FUROSEMIDE 40 MG: 10 INJECTION, SOLUTION INTRAMUSCULAR; INTRAVENOUS at 05:37

## 2019-01-01 RX ADMIN — POTASSIUM CHLORIDE 20 MEQ: 20 TABLET, EXTENDED RELEASE ORAL at 18:21

## 2019-01-01 RX ADMIN — ENOXAPARIN SODIUM 40 MG: 100 INJECTION SUBCUTANEOUS at 06:13

## 2019-01-01 RX ADMIN — MORPHINE SULFATE 4 MG: 4 INJECTION INTRAVENOUS at 16:42

## 2019-01-01 RX ADMIN — OXYCODONE HYDROCHLORIDE 10 MG: 10 TABLET ORAL at 12:57

## 2019-01-01 RX ADMIN — POTASSIUM CHLORIDE 20 MEQ: 20 TABLET, EXTENDED RELEASE ORAL at 06:12

## 2019-01-01 RX ADMIN — FUROSEMIDE 40 MG: 10 INJECTION, SOLUTION INTRAMUSCULAR; INTRAVENOUS at 16:15

## 2019-01-01 RX ADMIN — ACETAMINOPHEN 650 MG: 325 TABLET, FILM COATED ORAL at 21:25

## 2019-01-01 RX ADMIN — LIDOCAINE HYDROCHLORIDE 15 ML: 20 SOLUTION OROPHARYNGEAL at 12:54

## 2019-01-01 RX ADMIN — IOHEXOL 60 ML: 350 INJECTION, SOLUTION INTRAVENOUS at 19:05

## 2019-01-01 RX ADMIN — POTASSIUM CHLORIDE 20 MEQ: 20 TABLET, EXTENDED RELEASE ORAL at 06:59

## 2019-01-01 RX ADMIN — OXYCODONE HYDROCHLORIDE 10 MG: 10 TABLET ORAL at 08:15

## 2019-01-01 RX ADMIN — POTASSIUM CHLORIDE 20 MEQ: 20 TABLET, EXTENDED RELEASE ORAL at 05:35

## 2019-01-01 SDOH — HEALTH STABILITY: MENTAL HEALTH: HOW OFTEN DO YOU HAVE A DRINK CONTAINING ALCOHOL?: NEVER

## 2019-01-01 ASSESSMENT — ENCOUNTER SYMPTOMS
COUGH: 0
CONSTIPATION: 0
CLAUDICATION: 0
STRIDOR: 0
NAUSEA: 0
MYALGIAS: 0
VOMITING: 0
MYALGIAS: 0
BLURRED VISION: 0
FEVER: 0
MYALGIAS: 0
HEARTBURN: 0
FEVER: 1
HEADACHES: 0
SPUTUM PRODUCTION: 1
HEADACHES: 0
PALPITATIONS: 0
DIZZINESS: 0
PALPITATIONS: 0
WEAKNESS: 0
ABDOMINAL PAIN: 0
CHILLS: 0
CHILLS: 0
SHORTNESS OF BREATH: 1
SENSORY CHANGE: 0
DEPRESSION: 0
COUGH: 1
FEVER: 1
NERVOUS/ANXIOUS: 0
TINGLING: 0
COUGH: 1
FEVER: 1
WEAKNESS: 0
DIZZINESS: 0
VOMITING: 0
DIZZINESS: 0
NERVOUS/ANXIOUS: 0
FALLS: 0
NAUSEA: 0
NERVOUS/ANXIOUS: 0
ABDOMINAL PAIN: 0
SORE THROAT: 1
SORE THROAT: 0
SENSORY CHANGE: 0
ABDOMINAL PAIN: 0
DIARRHEA: 0
CHILLS: 0
PALPITATIONS: 0
NERVOUS/ANXIOUS: 0
SHORTNESS OF BREATH: 1
INSOMNIA: 0
FEVER: 0
SHORTNESS OF BREATH: 1
SPUTUM PRODUCTION: 1
PHOTOPHOBIA: 0
HEADACHES: 0
PALPITATIONS: 0
DIARRHEA: 0
SPUTUM PRODUCTION: 1
CHILLS: 0
PHOTOPHOBIA: 0
DEPRESSION: 0
DEPRESSION: 0
COUGH: 1
SHORTNESS OF BREATH: 0
BLURRED VISION: 0
DEPRESSION: 0
CHILLS: 0
INSOMNIA: 0
FEVER: 0
SHORTNESS OF BREATH: 0
CLAUDICATION: 0
SHORTNESS OF BREATH: 1
CONSTIPATION: 0
SORE THROAT: 0
NERVOUS/ANXIOUS: 0
DEPRESSION: 0
COUGH: 0
NAUSEA: 0
CHILLS: 0
LOSS OF CONSCIOUSNESS: 0
DEPRESSION: 0
CONSTIPATION: 0
WEAKNESS: 0
SPEECH CHANGE: 0
DIARRHEA: 0
SPEECH CHANGE: 0
COUGH: 1
SPUTUM PRODUCTION: 1
VOMITING: 0
HEARTBURN: 0

## 2019-01-01 ASSESSMENT — COGNITIVE AND FUNCTIONAL STATUS - GENERAL
MOBILITY SCORE: 23
SUGGESTED CMS G CODE MODIFIER MOBILITY: CI
SUGGESTED CMS G CODE MODIFIER DAILY ACTIVITY: CH
DAILY ACTIVITIY SCORE: 24
CLIMB 3 TO 5 STEPS WITH RAILING: A LITTLE

## 2019-01-01 ASSESSMENT — LIFESTYLE VARIABLES
HAVE PEOPLE ANNOYED YOU BY CRITICIZING YOUR DRINKING: NO
EVER_SMOKED: YES
DOES PATIENT WANT TO STOP DRINKING: NO
ON A TYPICAL DAY WHEN YOU DRINK ALCOHOL HOW MANY DRINKS DO YOU HAVE: 0
HAVE YOU EVER FELT YOU SHOULD CUT DOWN ON YOUR DRINKING: NO
EVER HAD A DRINK FIRST THING IN THE MORNING TO STEADY YOUR NERVES TO GET RID OF A HANGOVER: NO
TOTAL SCORE: 0
TOTAL SCORE: 0
ALCOHOL_USE: NO
EVER FELT BAD OR GUILTY ABOUT YOUR DRINKING: NO
CONSUMPTION TOTAL: NEGATIVE
AVERAGE NUMBER OF DAYS PER WEEK YOU HAVE A DRINK CONTAINING ALCOHOL: 0
HOW MANY TIMES IN THE PAST YEAR HAVE YOU HAD 5 OR MORE DRINKS IN A DAY: 0
TOTAL SCORE: 0

## 2019-01-01 ASSESSMENT — PAIN SCALES - WONG BAKER
WONGBAKER_NUMERICALRESPONSE: HURTS A LITTLE MORE
WONGBAKER_NUMERICALRESPONSE: HURTS JUST A LITTLE BIT

## 2019-01-01 ASSESSMENT — PATIENT HEALTH QUESTIONNAIRE - PHQ9
2. FEELING DOWN, DEPRESSED, IRRITABLE, OR HOPELESS: NOT AT ALL
SUM OF ALL RESPONSES TO PHQ9 QUESTIONS 1 AND 2: 0
1. LITTLE INTEREST OR PLEASURE IN DOING THINGS: NOT AT ALL

## 2019-01-01 ASSESSMENT — COPD QUESTIONNAIRES
HAVE YOU SMOKED AT LEAST 100 CIGARETTES IN YOUR ENTIRE LIFE: YES
IN THE PAST 12 MONTHS DO YOU DO LESS THAN YOU USED TO BECAUSE OF YOUR BREATHING PROBLEMS: AGREE
DO YOU EVER COUGH UP ANY MUCUS OR PHLEGM?: YES, A FEW DAYS A WEEK OR MONTH
COPD SCREENING SCORE: 7
DURING THE PAST 4 WEEKS HOW MUCH DID YOU FEEL SHORT OF BREATH: SOME OF THE TIME

## 2019-11-15 NOTE — ED TRIAGE NOTES
63 y/o female bib wheelchair from her oncologist's office for evaluation of low o2 sats (85-88% on RA). Pt states she has been feeling weak and tired, she was recently hospitalized at [facility]. She just finished her chemo and radiation treatments.

## 2019-11-16 PROBLEM — E87.6 HYPOKALEMIA: Status: ACTIVE | Noted: 2019-01-01

## 2019-11-16 PROBLEM — R79.89 ELEVATED BRAIN NATRIURETIC PEPTIDE (BNP) LEVEL: Status: ACTIVE | Noted: 2019-01-01

## 2019-11-16 PROBLEM — E86.0 DEHYDRATION: Status: ACTIVE | Noted: 2019-01-01

## 2019-11-16 PROBLEM — C34.90 LUNG CANCER, PRIMARY, WITH METASTASIS FROM LUNG TO OTHER SITE (HCC): Status: ACTIVE | Noted: 2019-01-01

## 2019-11-16 PROBLEM — J96.01 ACUTE RESPIRATORY FAILURE WITH HYPOXIA (HCC): Status: ACTIVE | Noted: 2019-01-01

## 2019-11-16 PROBLEM — D53.9 MACROCYTIC ANEMIA: Status: ACTIVE | Noted: 2019-01-01

## 2019-11-16 PROBLEM — J44.9 COPD (CHRONIC OBSTRUCTIVE PULMONARY DISEASE) (HCC): Status: ACTIVE | Noted: 2019-01-01

## 2019-11-16 NOTE — H&P
Hospital Medicine History & Physical Note    Date of Service  11/15/2019    Primary Care Physician  None     Consultants  None    Code Status  Full    Chief Complaint  Hypoxia    History of Presenting Illness  64 y.o. female who presented 11/15/2019 with hypoxia noted in her oncologist office.  She does have a history of lung cancer which was diagnosed in August, was started on chemotherapy and radiation in September.  She did just finish her radiation.  Was in her oncologist office getting immunotherapy.  Was noted to be satting 85%.  She does complain of a low-grade fever as well as a cough with clear mucus with occasional red streaks.  She has noted pain with swallowing since radiation.  I did discuss the case including labs and imaging with the ER physician.    Review of Systems  Review of Systems   Constitutional: Positive for malaise/fatigue. Negative for chills and fever.   HENT: Positive for sore throat. Negative for congestion.    Respiratory: Positive for cough, sputum production and shortness of breath. Negative for stridor.    Cardiovascular: Negative for chest pain, palpitations and leg swelling.   Gastrointestinal: Negative for abdominal pain, constipation, diarrhea, nausea and vomiting.   Genitourinary: Negative for dysuria and urgency.   Musculoskeletal: Negative for falls and myalgias.   Neurological: Negative for dizziness, tingling, loss of consciousness, weakness and headaches.   Psychiatric/Behavioral: Negative for depression and suicidal ideas.   All other systems reviewed and are negative.      Past Medical History   has a past medical history of Lung cancer, primary, with metastasis from lung to other site (HCC).    Surgical History   has a past surgical history that includes primary c section and bunionectomy.     Family History  Cancer    Social History   reports that she quit smoking about 7 years ago. She smoked 0.00 packs per day. She has quit using smokeless tobacco. She reports that  she does not drink alcohol or use drugs.    Allergies  No Known Allergies    Medications  None       Physical Exam  Temp:  [37 °C (98.6 °F)] 37 °C (98.6 °F)  Pulse:  [85-90] 90  Resp:  [16] 16  BP: (104-105)/(55-80) 105/55  SpO2:  [95 %-100 %] 100 %    Physical Exam  Vitals signs and nursing note reviewed.   Constitutional:       General: She is not in acute distress.     Appearance: She is well-developed. She is not diaphoretic.   HENT:      Head: Normocephalic and atraumatic.      Right Ear: External ear normal.      Left Ear: External ear normal.      Nose: Nose normal. No congestion or rhinorrhea.      Mouth/Throat:      Mouth: Mucous membranes are dry.      Pharynx: No oropharyngeal exudate or posterior oropharyngeal erythema.   Eyes:      General: No scleral icterus.        Right eye: No discharge.         Left eye: No discharge.      Extraocular Movements: Extraocular movements intact.   Neck:      Musculoskeletal: Normal range of motion and neck supple.      Trachea: No tracheal deviation.   Cardiovascular:      Rate and Rhythm: Normal rate and regular rhythm.      Heart sounds: No murmur. No friction rub. No gallop.    Pulmonary:      Effort: Pulmonary effort is normal. No respiratory distress.      Breath sounds: No stridor. Decreased breath sounds (slightly ) present. No wheezing or rales.   Chest:      Chest wall: No tenderness.   Abdominal:      General: Bowel sounds are normal. There is no distension.      Palpations: Abdomen is soft.      Tenderness: There is no tenderness. There is no guarding or rebound.   Musculoskeletal: Normal range of motion.         General: No tenderness.      Right lower leg: No edema.      Left lower leg: No edema.   Lymphadenopathy:      Cervical: No cervical adenopathy.   Skin:     General: Skin is warm and dry.      Coloration: Skin is not jaundiced.      Findings: No erythema or rash.   Neurological:      General: No focal deficit present.      Mental Status: She is  alert and oriented to person, place, and time.      Cranial Nerves: No cranial nerve deficit.   Psychiatric:         Mood and Affect: Mood normal.         Behavior: Behavior normal.         Thought Content: Thought content normal.         Cognition and Memory: Memory is impaired.         Judgment: Judgment normal.         Laboratory:  Recent Labs     11/15/19  1629   WBC 5.6   RBC 2.50*   HEMOGLOBIN 8.0*   HEMATOCRIT 25.2*   .8*   MCH 32.0   MCHC 31.7*   RDW 54.6*   PLATELETCT 333   MPV 9.2     Recent Labs     11/15/19  1629   SODIUM 140   POTASSIUM 3.5*   CHLORIDE 105   CO2 26   GLUCOSE 144*   BUN 11   CREATININE 0.61   CALCIUM 8.6     Recent Labs     11/15/19  1629   ALTSGPT 15   ASTSGOT 16   ALKPHOSPHAT 69   TBILIRUBIN 0.4   GLUCOSE 144*         Recent Labs     11/15/19  1629   NTPROBNP 235*         No results for input(s): TROPONINT in the last 72 hours.    Urinalysis:    No results found     Imaging:  CT-CTA CHEST PULMONARY ARTERY W/ RECONS   Final Result      1.  No CT evidence of pulmonary embolism.      2.  Cavitary mass in the left lower pulmonary lobe is identified. Consider lung carcinoma. Infectious lesion is also possible.      3.  Emphysema and peripheral areas of pulmonary fibrosis.      4.  Mild left hilar and mediastinal adenopathy. Tumor involvement of these nodes is a possibility.            DX-CHEST-PORTABLE (1 VIEW)   Final Result         Spiculated mass in the left mid lung could relate to known malignancy.      Mild scattered hazy opacities throughout both lungs. Subtle superimposed infection cannot be excluded.            Assessment/Plan:  I anticipate this patient will require at least two midnights for appropriate medical management, necessitating inpatient admission.    Acute respiratory failure with hypoxia (HCC)- (present on admission)  Assessment & Plan  -At this time, I feel this is likely due to combination of COPD, lung cancer as well as recent radiation  -I did personally  review her CT of the chest, noted significant chronic changes as well as a left sided mass, no pneumonia noted  -Obtain procalcitonin but no antibiotics will be started at this time  -Continue oxygen, wean as able  -She did just get her immunotherapy, it is less likely but there is the potential for pneumonitis, if no improvement, consider high-dose steroids    Dehydration  Assessment & Plan  -Start IV fluids  -Repeat BMP in the morning    Hypokalemia- (present on admission)  Assessment & Plan  -Place IV potassium and with IV fluids  -Repeat BMP in the morning    Macrocytic anemia- (present on admission)  Assessment & Plan  -No sign of gross bleeding  -Repeat CBC in the morning    Lung cancer, primary, with metastasis from lung to other site (HCC)- (present on admission)  Assessment & Plan  -Continue outpatient follow-up and treatment    COPD (chronic obstructive pulmonary disease) (HCC)- (present on admission)  Assessment & Plan  -No acute exacerbation      VTE prophylaxis: Lovenox

## 2019-11-16 NOTE — CARE PLAN
Problem: Safety  Goal: Will remain free from falls  Outcome: PROGRESSING AS EXPECTED  Intervention: Implement fall precautions  Flowsheets  Taken 11/15/2019 2300  Environmental Precautions: Treaded Slipper Socks on Patient;Personal Belongings, Wastebasket, Call Bell etc. in Easy Reach;Transferred to Stronger Side;Report Given to Other Health Care Providers Regarding Fall Risk;Bed in Low Position;Mobility Assessed & Appropriate Sign Placed;Communication Sign for Patients & Families  Taken 11/16/2019 0148  Chair/Bed Strip Alarm: Yes - Alarm On     Problem: Infection  Goal: Will remain free from infection  Outcome: PROGRESSING AS EXPECTED  Intervention: Assess signs and symptoms of infection  Note:   Vital signs q 8 hours. Pt is afebrile.

## 2019-11-16 NOTE — ASSESSMENT & PLAN NOTE
11/17-11/18 - decreasing O2 demand with diuresis  11/16 - Stopped fluids.  She is plenty hydrated, overhydrating actually.  11/15 - adm - Start IV fluids. -Repeat BMP in the morning

## 2019-11-16 NOTE — ASSESSMENT & PLAN NOTE
Overhydration likely, EF normal on Echo without any evidence of heart failure  Responded well to diuresis

## 2019-11-16 NOTE — ED NOTES
Medication reconciliation updated and complete per pt at bedside  Allergies have been verified   No oral ABX within the last 14 days  Pt home pharmacy:[facility]    Pt reports that she received her first dose of DURVALUMAB today.            The patient is a 68y Female complaining of

## 2019-11-16 NOTE — PROGRESS NOTES
[facility] Medicine Daily Progress Note    Date of Service: 11/16/2019 Code Status: Full Code     Chief Complaint  Chief Complaint   Patient presents with   • Tired   • Weakness   • Shortness of Breath       Consultants/Specialty: None Disposition: Remain IP     Hospital Course     ER Course  Michelle Billingsley is a 64 y.o. female with a history of metastatic lung cancer who presents to the Emergency Department for evaluation of fatigue, weakness and shortness of breath. She is also endorsing a cough with sputum production, but suspects that this is secondary to her cancer treatment as she has been coughing intermittently for months, and notes that the cough started when her treatment started. Her last cancer treatment was four weeks ago. Today, she is here as she was hypoxic at her oncologists office and was saturating around 85% on room air. Given concerns over a possible infection causing the symptoms and her weakened immune system, she was sent here for evaluation. She denies any fevers, chills, nausea, vomiting or hemoptysis.  Admission Course  She does complain of a low-grade fever as well as a cough with clear mucus with occasional red streaks.  She has noted pain with swallowing since radiation  11/16 - Patient consuming excessive volumes of water, started since her recent admission at [facility], where she was instructed to push fluids and stay hydrated.  Stopped IVF, started 20mg lasix iv bid, O2 down from 2 to 1 this evening, down 1.1K after first dose.  Echo ordered for possible cardiotoxicity.         Interval Problem Update  Patient was seen and evaluated today for hypoxia at doctor's office, tired, weak, sob      Review of Systems  Review of Systems   Constitutional: Positive for fever and malaise/fatigue. Negative for chills.   Respiratory: Positive for cough, sputum production and shortness of breath.    Cardiovascular: Negative for chest pain and palpitations.   Skin: Negative for itching and rash.  "  Psychiatric/Behavioral: Negative for depression. The patient is not nervous/anxious.     Physical Exam  Physical Exam   Constitutional: She is oriented to person, place, and time. No distress.   Neurological: She is alert and oriented to person, place, and time.   Skin: Skin is warm and dry. She is not diaphoretic.   Psychiatric: She has a normal mood and affect. Her behavior is normal. Judgment and thought content normal.   Nursing note and vitals reviewed.       I/Os  No intake or output data in the 24 hours ending 11/16/19 0717 Vital Signs  Temp:  [36.4 °C (97.5 °F)-37 °C (98.6 °F)] 36.4 °C (97.5 °F)  Pulse:  [77-92] 87  Resp:  [16-20] 20  BP: (101-131)/(55-80) 123/64  SpO2:  [94 %-100 %] 96 %     Laboratory  Results from last 7 days   Lab Units 11/16/19  0006 11/15/19  1629   WBC 1501 K/uL 5.0 5.6   HGB 1503 g/dL 7.7* 8.0*   HCT 1504 % 24.7* 25.2*   PLATELET COUNT 1518 K/uL 309 333    Results from last 7 days   Lab Units 11/16/19  0006 11/15/19  1629   SODIUM 101 mmol/L 142 140   POTASSIUM 102 mmol/L 3.8 3.5*   CHLORIDE 103 mmol/L 106 105   CO2 104 mmol/L 29 26   ANION GAP ANION  7.0 9.0    mg/dL 11 11   CREATININE 109 mg/dL 0.59 0.61   CALCIUM 105 mg/dL 8.5 8.6   GLUCOSE 112 mg/dL 98 144*   IF GEORGE AMER [id number] mL/min/1.73 m 2 >60 >60   IF NON AFRICAN AMER 109GFRB mL/min/1.73 m 2 >60 >60           Medications  senna-docusate, 2 Tab, Oral, BID  enoxaparin, 40 mg, Subcutaneous, DAILY  Pharmacy Consult Request, 1 Each, Other, PHARMACY TO DOSE         Medications were reviewed today.      Assessment/Plan  * Acute respiratory failure with hypoxia (HCC)- (present on admission)  Assessment & Plan  11/16 - Patient has been consuming excessive volumes of water \"staying hydrated\" and states she was fine until a recent hospitalization at [facility] where nursing kept telling her to drink lots of fluids.  Now she finds herself on oxygen.  BNP very midly elevated, echo order placed, 20mg iv lasix bid ordered.  Patient " down 1.1 k since first dose of lasix.  Down from initial 2L on presentation to 1L this evening O2 support.  11/15 - adm - At this time, I feel this is likely due to combination of COPD, lung cancer as well as recent radiation. -I did personally review her CT of the chest, noted significant chronic changes as well as a left sided mass, no pneumonia noted. -Obtain procalcitonin but no antibiotics will be started at this time. -Continue oxygen, wean as able. -She did just get her immunotherapy, it is less likely but there is the potential for pneumonitis, if no improvement, consider high-dose steroids    Lung cancer, primary, with metastasis from lung to other site (HCC)- (present on admission)  Assessment & Plan  11/16 - Active chemo process going on right now, causitive etiology for her anemia  11/15 - adm - Continue outpatient follow-up and treatment    Elevated brain natriuretic peptide (BNP) level- (present on admission)  Assessment & Plan  11/16 - mildly elevated on admission. Independent Read of CXR: Visualization of pulmonary vasculature symetrically with cephalization and interstitial edema, noted spiculated mass on the left included in formal read.  Ordered Echo, fully expect EF50-60%, grade1-2 diastolic dysfunciton and 35-45mm RVSP or pulmonary pressure consistent with overhydration. patient consuming excessive volumes of water per instructions to push fluids and stay hydrated.  This erroneous guidance by staff at other hospital has resulted in a hospitalization for overhydration.  Echo must be ordered as her chemo can be cardiotoxic, but this is not a heart failure exacerbation of any kind.  This is an overhydration from erroneous guidance provided by healthcare staff exacerbation.      Recent Labs     11/15/19  1629   NTPROBNP 235*         Hypokalemia- (present on admission)  Assessment & Plan  11/16 - Resolved with IV potassium  11/15 - adm - Place IV potassium and with IV fluids. -Repeat BMP in the  morning    Results from last 7 days   Lab Units 11/16/19  0006 11/15/19  1629   POTASSIUM 102 mmol/L 3.8 3.5*   CREATININE 109 mg/dL 0.59 0.61   IF GEORGE AMER 640336 mL/min/1.73 m 2 >60 >60   IF NON AFRICAN AMER 109GFRB mL/min/1.73 m 2 >60 >60            Dehydration- (present on admission)  Assessment & Plan  11/16 - Stopped fluids.  She is plenty hydrated, overhydrating actually.  11/15 - adm - Start IV fluids. -Repeat BMP in the morning    Macrocytic anemia- (present on admission)  Assessment & Plan  11/16 - stable - patient on active chemo right now., can also be very much a dilutional component.  11/115 - adm - No sign of gross bleeding -Repeat CBC in the morning    Results from last 7 days   Lab Units 11/16/19  0006 11/15/19  1629   HGB 1503 g/dL 7.7* 8.0*   HCT 1504 % 24.7* 25.2*   MCV 1505 fL 102.9* 100.8*   PLATELET COUNT 1518 K/uL 309 333     No results found for: FOLATE  No results found for: [id number]  No results found for: RETICCOUNT  No results found for: RETICABS  No results found for: IMRETICFR  No results found for: RETHGBEQ No results found for: FERRITIN  No results found for: TRANSFERRIN  No results found for: IRON  No results found for: TOTIRONBC  No results found for: SATURATION           COPD (chronic obstructive pulmonary disease) (HCC)- (present on admission)  Assessment & Plan  11/16 - stable  11/15 - adm - No acute exacerbation       VTE prophylaxis: Lovenox    Imaging  CT-CTA CHEST PULMONARY ARTERY W/ RECONS   Final Result      1.  No CT evidence of pulmonary embolism.      2.  Cavitary mass in the left lower pulmonary lobe is identified. Consider lung carcinoma. Infectious lesion is also possible.      3.  Emphysema and peripheral areas of pulmonary fibrosis.      4.  Mild left hilar and mediastinal adenopathy. Tumor involvement of these nodes is a possibility.            DX-CHEST-PORTABLE (1 VIEW)   Final Result         Spiculated mass in the left mid lung could relate to known  malignancy.      Mild scattered hazy opacities throughout both lungs. Subtle superimposed infection cannot be excluded.

## 2019-11-16 NOTE — PROGRESS NOTES
· 2 RN skin check complete with ANSHUL Brink.  · Devices in place N/A  · Skin assessed under devices N/A  · Confirmed pressure ulcers found on N/A  · New potential pressure ulcers noted on N/A   · Pt has radiation burn to left side of back.  · The following interventions in place pt educated about turns and nutrition.

## 2019-11-16 NOTE — ED PROVIDER NOTES
ED Provider Note    Scribed for Tommie Oneal M.D. by Adam Bee. 11/15/2019, 4:10 PM.    Primary care provider: None noted  Means of arrival: Walk In  History obtained from: Patient  History limited by: None    CHIEF COMPLAINT  Chief Complaint   Patient presents with   • Tired   • Weakness   • Shortness of Breath       HPI  Michelle Billingsley is a 64 y.o. female with a history of metastatic lung cancer who presents to the Emergency Department for evaluation of fatigue, weakness and shortness of breath. She is also endorsing a cough with sputum production, but suspects that this is secondary to her cancer treatment as she has been coughing intermittently for months, and notes that the cough started when her treatment started. Her last cancer treatment was four weeks ago. Today, she is here as she was hypoxic at her oncologists office and was saturating around 85% on room air. Given concerns over a possible infection causing the symptoms and her weakened immune system, she was sent here for evaluation. She denies any fevers, chills, nausea, vomiting or hemoptysis.    REVIEW OF SYSTEMS  Pertinent positives include fatigue, weakness, shortness of breath, cough, sputum production. Pertinent negatives include no fevers, chills, nausea, vomiting. As above, all other systems reviewed and are negative.   See HPI for further details.     PAST MEDICAL HISTORY   has a past medical history of Lung cancer, primary, with metastasis from lung to other site (HCC).    SURGICAL HISTORY   has a past surgical history that includes primary c section and bunionectomy.    SOCIAL HISTORY  Social History     Tobacco Use   • Smoking status: Former Smoker     Packs/day: 0.00     Last attempt to quit: 11/15/2012     Years since quittin.0   • Smokeless tobacco: Former User   Substance Use Topics   • Alcohol use: Never     Frequency: Never   • Drug use: Never      Social History     Substance and Sexual Activity   Drug Use Never        FAMILY HISTORY  History reviewed. No pertinent family history.    CURRENT MEDICATIONS  No current facility-administered medications on file prior to encounter.      No current outpatient medications on file prior to encounter.     ALLERGIES  No Known Allergies    PHYSICAL EXAM  VITAL SIGNS: /80   Pulse 85   Temp 37 °C (98.6 °F) (Temporal)   Resp 16   Wt 93.4 kg (206 lb)   SpO2 95%   Vitals reviewed.  Corrected hypoxia on oxygen.  Constitutional: Alert  HENT: No signs of trauma, Bilateral external ears normal, Nose normal.   Eyes: Pupils are equal and reactive, Conjunctiva normal, Non-icteric.   Neck: Normal range of motion, No tenderness, Supple, No stridor.   Lymphatic: No lymphadenopathy noted.   Cardiovascular: Regular rate and rhythm, no murmurs.   Thorax & Lungs: Crackles right middle lobe, No respiratory distress, No wheezing, No chest tenderness.   Abdomen: Bowel sounds normal, Soft, No tenderness, No peritoneal signs, No masses, No pulsatile masses.   Skin: Warm, Dry, No erythema, No rash.   Back: No bony tenderness, No CVA tenderness.   Extremities: Intact distal pulses, No edema, No tenderness, No cyanosis  Musculoskeletal: Good range of motion in all major joints. No tenderness to palpation or major deformities noted.   Neurologic: Alert , Normal motor function, Normal sensory function, No focal deficits noted.   Psychiatric: Affect normal, Judgment normal, Mood normal.     DIAGNOSTIC STUDIES / PROCEDURES    LABS  Labs Reviewed   CBC WITH DIFFERENTIAL - Abnormal; Notable for the following components:       Result Value    RBC 2.50 (*)     Hemoglobin 8.0 (*)     Hematocrit 25.2 (*)     .8 (*)     MCHC 31.7 (*)     RDW 54.6 (*)     Neutrophils-Polys 74.00 (*)     Lymphocytes 14.50 (*)     Immature Granulocytes 1.30 (*)     Lymphs (Absolute) 0.81 (*)     All other components within normal limits   COMP METABOLIC PANEL - Abnormal; Notable for the following components:    Potassium 3.5  "(*)     Glucose 144 (*)     All other components within normal limits   PROBRAIN NATRIURETIC PEPTIDE, NT - Abnormal; Notable for the following components:    NT-proBNP 235 (*)     All other components within normal limits   INFLUENZA A/B BY PCR   BLOOD CULTURE    Narrative:     Per Hospital Policy: Only change Specimen Src: to \"Line\" if  specified by physician order.   BLOOD CULTURE    Narrative:     Per Hospital Policy: Only change Specimen Src: to \"Line\" if  specified by physician order.   LACTIC ACID   ESTIMATED GFR      All labs reviewed by me.    EKG Interpretation:  Interpreted by me    12 Lead EKG interpreted by me to show:  Normal sinus rhythm  Rate 86  1 PVC  Axis: Normal  Intervals: Normal  Normal T waves  Normal ST segments  My impression of this EKG: Does not indicate ischemia or arrhythmia at this time.    RADIOLOGY  CT-CTA CHEST PULMONARY ARTERY W/ RECONS   Final Result      1.  No CT evidence of pulmonary embolism.      2.  Cavitary mass in the left lower pulmonary lobe is identified. Consider lung carcinoma. Infectious lesion is also possible.      3.  Emphysema and peripheral areas of pulmonary fibrosis.      4.  Mild left hilar and mediastinal adenopathy. Tumor involvement of these nodes is a possibility.            DX-CHEST-PORTABLE (1 VIEW)   Final Result         Spiculated mass in the left mid lung could relate to known malignancy.      Mild scattered hazy opacities throughout both lungs. Subtle superimposed infection cannot be excluded.        The radiologist's interpretation of all radiological studies have been reviewed by me.    COURSE & MEDICAL DECISION MAKING  Nursing notes, VS, PMSFHx reviewed in chart.    Differential diagnoses include but not limited to: Pneumonia, PE, Influenza,     4:10 PM Patient seen and examined at bedside. Ordered for DX-Chest 1 view, CBC with differential, CMP, Influenza by PCR A/B, CT-CTA Chest pulmonary artery w/ recons, Lactic acid, Blood culture, EKG to " evaluate. Patient will be treated with morphine 4 mg for her symptoms. Discussed with the patient that there is concern she may have pneumonia or a blood clot and thus I would like to get imaging of her chest to rule out these possibilities. Further I will check her blood work to rule out underlying infections including influenza. She will be informed of the results of her work up when they return. She is made aware that she may need to be admitted to the hospital for continued observation and treatment as well if her oxygen levels do not improve.    7:21 PM -the patient's CT scan does not show pulmonary embolism.  It is not clear whether she has emphysema which is causing hypoxia or if she has underlying pneumonia.  The patient's influenza test is negative.  I paged the [facility] hospitalist.      The patient will be evaluated by the [facility] hospitalist for hospitalization.  The patient is hypoxic and is not on home oxygen. I spoke with Dr. Petros Richard, records from [place] have been requested.    FINAL IMPRESSION  1. Hypoxia          IAdam (Corazon), am scribing for, and in the presence of, Tommie Oneal M.D..    Electronically signed by: Adam Bee (Corazon), 11/15/2019    ITommie M.D. personally performed the services described in this documentation, as scribed by Adam Bee in my presence, and it is both accurate and complete. C.    The note accurately reflects work and decisions made by me.  Tommie Oneal  11/15/2019  7:32 PM

## 2019-11-16 NOTE — PROGRESS NOTES
"/77   Pulse 87   Temp 36.7 °C (98 °F) (Oral)   Resp 18   Ht 1.727 m (5' 8\")   Wt 93 kg (205 lb 0.4 oz)   SpO2 94%   BMI 31.17 kg/m²   Received report and assumed care of pt 2300. Pt is A&O x4. Pt complains of pain to right side of neck but declines medication. POC discussed and updated. VSS. PIV is patent and infusing. Bed is in lowest position and call light is within reach. Hourly rounding is in place.   "

## 2019-11-16 NOTE — CARE PLAN
Problem: Safety  Goal: Will remain free from injury  Outcome: PROGRESSING AS EXPECTED  Note:   Patient occasionally forgets to call to use restroom. Re-enforced call light use. Bed alarm in place.   Goal: Will remain free from falls  Outcome: PROGRESSING AS EXPECTED     Problem: Pain Management  Goal: Pain level will decrease to patient's comfort goal  Outcome: PROGRESSING AS EXPECTED

## 2019-11-16 NOTE — ASSESSMENT & PLAN NOTE
11/18 - O2 demand holding at 0.5L, continuing diuresis plan.  Breathing better  11/19 - continue diuresis, likely able to wean from oxygen by tomorrow  11/20 - weaned from oxygen

## 2019-11-17 NOTE — CARE PLAN
Problem: Safety  Goal: Will remain free from falls  Outcome: PROGRESSING AS EXPECTED  Intervention: Implement fall precautions  Flowsheets  Taken 11/16/2019 2000 by Cuba Lund R.N.  Environmental Precautions: Treaded Slipper Socks on Patient;Personal Belongings, Wastebasket, Call Bell etc. in Easy Reach;Report Given to Other Health Care Providers Regarding Fall Risk;Bed in Low Position;Communication Sign for Patients & Families;Mobility Assessed & Appropriate Sign Placed  Taken 11/16/2019 0148 by Thomas Pearson R.N.  Chair/Bed Strip Alarm: Yes - Alarm On  Note:   Patient steady, oriented.  No fall risk per scale overnight; bed alarm on.  Environmental precautions in place. Patient calling appropriately for assistance.     Problem: Respiratory:  Goal: Respiratory status will improve  Outcome: PROGRESSING AS EXPECTED  Intervention: Assess and monitor pulmonary status  Flowsheets  Taken 11/16/2019 2156 by Cuba Lund R.N.  Resp: 18  Taken 11/16/2019 2044 by Rita Palmer, C.N.AMerissa  SpO2: 97 %  O2 (LPM): 1  Taken 11/17/2019 0407 by Cuba Lund R.N.  Cough: Productive;Strong  Taken 11/17/2019 0041 by Cuba Lund R.N.  RUL Breath Sounds: Diminished  RML Breath Sounds: Diminished  RLL Breath Sounds: Diminished  AYDEN Breath Sounds: Diminished  LLL Breath Sounds: Diminished  Note:   Patient saturating >90% on 1L O2 overnight.  Productive cough with scant pink tinge.  Patient more dyspneic with activity.  Patient encouraged to conserve energy, increase oxygen as needed for activity.

## 2019-11-17 NOTE — PROGRESS NOTES
Received report from day RN and assumed care of patient at 1900.  Assessed patient and reviewed labs and notes.  Patient is AOx4, up SBA, steady.  Patient reports throat pain and was medicated overnight per MAR.  Patient has productive cough with scant pink-tinged sputum.  Per patient, this is not new.  Plan of care reviewed, patient board updated, safety precautions including bed alarm in place, and patient calling appropriately for assistance.

## 2019-11-17 NOTE — PROGRESS NOTES
Received report and assumed care of patient at 0700. Patient is A&Ox4  Patient complains of pain in the throat with eating. Oxycodone administered prior to meals for better control.  Reinforced education from yesterday  on importance of being out bed for meals. Patient adhered to this at dinner time.  PIV is patent and flushes without resistance. Lasix administered with good fluid out response. VSS. Encouraged incentive spirometer and oxygen use has been discontinued.  Up ambulating in the kong. Completed 3 laps off oxygen. Lowest O2 reading with ambulation and conversation of 83% but did come back up into the 90's before completion of walk.  Echo completed.   Bed is in the lowest positions, bed alarm is on, and call light is in reach.  All questions were answered.

## 2019-11-17 NOTE — CARE PLAN
Problem: Safety  Goal: Will remain free from injury  Outcome: PROGRESSING AS EXPECTED  Goal: Will remain free from falls  Outcome: PROGRESSING AS EXPECTED     Problem: Respiratory:  Goal: Respiratory status will improve  Outcome: PROGRESSING AS EXPECTED  Note:   Still requiring 1L of oxygen.  Encouraged IS and educated on importance.  Will attempt to remove today.

## 2019-11-17 NOTE — PROGRESS NOTES
Received report and assumed care of patient at 0700. Patient is A&Ox4  Patient complains of pain in the throat with eating. Oxycodone administered prior to meals for better control.  Educated patient on importance of being out bed for meals. Patient adhered to this at dinner time.  PIV is patent and IVF were discontinued. Lasix administered with good fluid out response. VSS. Titrated oxygen down to 1L.  Bed is in the lowest positions, bed alarm is on, and call light is in reach.  All questions were answered.

## 2019-11-18 NOTE — DIETARY
Nutrition Services - Poor po reported on admission. Malnutrition Screening Tool score <2. Nutrition assessment not indicated at this time. RD will monitor per department guidelines. Please consult RD for nutrition concerns.

## 2019-11-18 NOTE — CARE PLAN
Problem: Knowledge Deficit  Goal: Knowledge of disease process/condition, treatment plan, diagnostic tests, and medications will improve  Outcome: PROGRESSING AS EXPECTED  Intervention: Explain information regarding disease process/condition, treatment plan, diagnostic tests, and medications and document in education  Note:   Patient had general questions regarding chemo, radiation, and immunotherapy, including risks and side effects.  Questions received and answered.     Problem: Respiratory:  Goal: Respiratory status will improve  Outcome: PROGRESSING AS EXPECTED  Intervention: Administer and titrate oxygen therapy  Note:   Patient resting comfortably on .5L O2 with saturation levels > 90%

## 2019-11-18 NOTE — PROGRESS NOTES
Hospital Medicine Daily Progress Note    Date of Service: 11/17/2019 Code Status: Full Code     Chief Complaint  Chief Complaint   Patient presents with   • Tired   • Weakness   • Shortness of Breath       Consultants/Specialty: None Disposition: Remain IP     Hospital Course     ER Course  Michelle Billingsley is a 64 y.o. female with a history of metastatic lung cancer who presents to the Emergency Department for evaluation of fatigue, weakness and shortness of breath. She is also endorsing a cough with sputum production, but suspects that this is secondary to her cancer treatment as she has been coughing intermittently for months, and notes that the cough started when her treatment started. Her last cancer treatment was four weeks ago. Today, she is here as she was hypoxic at her oncologists office and was saturating around 85% on room air. Given concerns over a possible infection causing the symptoms and her weakened immune system, she was sent here for evaluation. She denies any fevers, chills, nausea, vomiting or hemoptysis.  Admission Course  She does complain of a low-grade fever as well as a cough with clear mucus with occasional red streaks.  She has noted pain with swallowing since radiation  11/16 - Patient consuming excessive volumes of water, started since her recent admission at [facility], where she was instructed to push fluids and stay hydrated.  Stopped IVF, started 20mg lasix iv bid, O2 down from 2 to 1 this evening, down 1.1K after first dose.  Echo ordered for possible cardiotoxicity.  11/17 - Echo completed today, not resulted, diuresis slowed after first dose, increased to 40mg iv bid.  Decreased O2 need quite a bit, but may still need O2 while ambulating.         Interval Problem Update  Patient was seen and evaluated today for hypoxia at doctor's office, tired, weak, sob      Review of Systems  Review of Systems   Constitutional: Positive for fever and malaise/fatigue. Negative for chills.    Respiratory: Positive for cough, sputum production and shortness of breath.    Cardiovascular: Negative for chest pain and palpitations.   Skin: Negative for itching and rash.   Psychiatric/Behavioral: Negative for depression. The patient is not nervous/anxious.     Physical Exam  Physical Exam   Constitutional: She is oriented to person, place, and time. No distress.   Neurological: She is alert and oriented to person, place, and time.   Skin: Skin is warm and dry. She is not diaphoretic.   Psychiatric: She has a normal mood and affect. Her behavior is normal. Judgment and thought content normal.   Nursing note and vitals reviewed.       I/Os    Intake/Output Summary (Last 24 hours) at 11/17/2019 2023  Last data filed at 11/17/2019 1627  Gross per 24 hour   Intake 600 ml   Output 1600 ml   Net -1000 ml    Vital Signs  Temp:  [36.4 °C (97.5 °F)-37.1 °C (98.7 °F)] 36.8 °C (98.2 °F)  Pulse:  [70-97] 89  Resp:  [16-18] 16  BP: (104-110)/(53-74) 109/68  SpO2:  [85 %-98 %] 95 %     Laboratory  Results from last 7 days   Lab Units 11/17/19  0028 11/16/19  0006   WBC 1501 K/uL 5.6 5.0   HGB 1503 g/dL 7.9* 7.7*   HCT 1504 % 24.2* 24.7*   PLATELET COUNT 1518 K/uL 324 309    Results from last 7 days   Lab Units 11/17/19  0028 11/16/19  0006   SODIUM 101 mmol/L 141 142   POTASSIUM 102 mmol/L 3.9 3.8   CHLORIDE 103 mmol/L 102 106   CO2 104 mmol/L 34* 29   ANION GAP ANION  5.0 7.0    mg/dL 9 11   CREATININE 109 mg/dL 0.60 0.59   CALCIUM 105 mg/dL 8.7 8.5   GLUCOSE 112 mg/dL 106* 98   IF GEORGE AMER [id number] mL/min/1.73 m 2 >60 >60   IF NON AFRICAN AMER [id number] mL/min/1.73 m 2 >60 >60           Medications  [START ON 11/18/2019] furosemide, 40 mg, Intravenous, BID DIURETIC  potassium chloride SA, 20 mEq, Oral, BID  senna-docusate, 2 Tab, Oral, BID  enoxaparin, 40 mg, Subcutaneous, DAILY  Pharmacy Consult Request, 1 Each, Other, PHARMACY TO DOSE         Medications were reviewed today.      Assessment/Plan  * Acute  "respiratory failure with hypoxia (HCC)- (present on admission)  Assessment & Plan  11/17 - Patient decreasing her O2 need while resting, but had a desaturation with ambulation episode today off O2.  Will likely need home O2 and continued diuresis while inpatient.  Echo completed, results pending.  11/16 - Patient has been consuming excessive volumes of water \"staying hydrated\" and states she was fine until a recent hospitalization at [facility] where nursing kept telling her to drink lots of fluids.  Now she finds herself on oxygen.  BNP very midly elevated, echo order placed, 20mg iv lasix bid ordered.  Patient down 1.1 k since first dose of lasix.  Down from initial 2L on presentation to 1L this evening O2 support.  11/15 - adm - At this time, I feel this is likely due to combination of COPD, lung cancer as well as recent radiation. -I did personally review her CT of the chest, noted significant chronic changes as well as a left sided mass, no pneumonia noted. -Obtain procalcitonin but no antibiotics will be started at this time. -Continue oxygen, wean as able. -She did just get her immunotherapy, it is less likely but there is the potential for pneumonitis, if no improvement, consider high-dose steroids    Lung cancer, primary, with metastasis from lung to other site (HCC)- (present on admission)  Assessment & Plan  11/16-11/17 - Active chemo process going on right now, causitive etiology for her anemia  11/15 - adm - Continue outpatient follow-up and treatment    Elevated brain natriuretic peptide (BNP) level- (present on admission)  Assessment & Plan  11/17 - echo completed, not resulted.  Decreased resting O2 demand.  Increased lasix to 40mg bid from 20mg as diuresis slowed after first dose.  11/16 - mildly elevated on admission. Independent Read of CXR: Visualization of pulmonary vasculature symetrically with cephalization and interstitial edema, noted spiculated mass on the left included in formal read.  Ordered " Echo, fully expect EF50-60%, grade1-2 diastolic dysfunciton and 35-45mm RVSP or pulmonary pressure consistent with overhydration. patient consuming excessive volumes of water per instructions to push fluids and stay hydrated.  This erroneous guidance by staff at other hospital has resulted in a hospitalization for overhydration.  Echo must be ordered as her chemo can be cardiotoxic, but this is not a heart failure exacerbation of any kind.  This is an overhydration from erroneous guidance provided by healthcare staff exacerbation.      Recent Labs     11/15/19  1629   NTPROBNP 235*         Hypokalemia- (present on admission)  Assessment & Plan  11/16-11/17 - Resolved with IV potassium  11/15 - adm - Place IV potassium and with IV fluids. -Repeat BMP in the morning    Results from last 7 days   Lab Units 11/17/19  0028 11/16/19  0006 11/15/19  1629   POTASSIUM 102 mmol/L 3.9 3.8 3.5*   CREATININE 109 mg/dL 0.60 0.59 0.61   IF GEORGE AMER [id number] mL/min/1.73 m 2 >60 >60 >60   IF NON AFRICAN AMER [id number] mL/min/1.73 m 2 >60 >60 >60            Dehydration- (present on admission)  Assessment & Plan  11/17 - decreasing O2 demand with diuresis  11/16 - Stopped fluids.  She is plenty hydrated, overhydrating actually.  11/15 - adm - Start IV fluids. -Repeat BMP in the morning    Macrocytic anemia- (present on admission)  Assessment & Plan  11/16-11/17 - stable - patient on active chemo right now., can also be very much a dilutional component.  11/115 - adm - No sign of gross bleeding -Repeat CBC in the morning    Results from last 7 days   Lab Units 11/17/19  0028 11/16/19  0006 11/15/19  1629   HGB 1503 g/dL 7.9* 7.7* 8.0*   HCT 1504 % 24.2* 24.7* 25.2*   MCV 1505 fL 99.6* 102.9* 100.8*   PLATELET COUNT 1518 K/uL 324 309 333     No results found for: FOLATE  No results found for: [id number]  No results found for: RETICCOUNT  No results found for: RETICABS  No results found for: IMRETICFR  No results found for: RETHGBEQ No  results found for: FERRITIN  No results found for: TRANSFERRIN  No results found for: IRON  No results found for: TOTIRONBC  No results found for: SATURATION           COPD (chronic obstructive pulmonary disease) (HCC)- (present on admission)  Assessment & Plan  11/16-11/17 - stable  11/15 - adm - No acute exacerbation       VTE prophylaxis: Lovenox    Imaging  EC-ECHOCARDIOGRAM COMPLETE W/O CONT         CT-CTA CHEST PULMONARY ARTERY W/ RECONS   Final Result      1.  No CT evidence of pulmonary embolism.      2.  Cavitary mass in the left lower pulmonary lobe is identified. Consider lung carcinoma. Infectious lesion is also possible.      3.  Emphysema and peripheral areas of pulmonary fibrosis.      4.  Mild left hilar and mediastinal adenopathy. Tumor involvement of these nodes is a possibility.            DX-CHEST-PORTABLE (1 VIEW)   Final Result         Spiculated mass in the left mid lung could relate to known malignancy.      Mild scattered hazy opacities throughout both lungs. Subtle superimposed infection cannot be excluded.

## 2019-11-18 NOTE — PROGRESS NOTES
Received report from day RN and assumed care of patient at 1900.  Assessed patient and reviewed labs and notes.  Patient is AOx4, up SBA, steady.  Patient requests GI cocktail before meals for throat pain.  Patient has productive cough with scant pink-tinged sputum.  Per patient, this is not new.  She states she is feeling better.  She slept well overnight on .5L O2.  Plan of care reviewed, patient board updated, safety precautions including bed alarm in place, and patient calling appropriately for assistance.

## 2019-11-19 NOTE — PROGRESS NOTES
Assumed patient care at 0700. Pt is A&Ox4 and a standby assist. Bed alarm is on and in the low and locked position. Call light within reach, wearing non-slip socks, and rounded on hourly. Patient likes to take GI cocktail before meals to help decrease throat pain. She calls appropriately and walked 2 laps around the unit. She is satting in the 90's on .5L o2 nasal canula.

## 2019-11-19 NOTE — RESPIRATORY CARE
COPD EDUCATION by COPD CLINICAL EDUCATOR  11/19/2019  at  12:16 PM by Luis Carter     Patient interviewed by COPD education team.  Patient unable to participate in full program.  Short intervention has been conducted.  A comprehensive packet including information about COPD, treatments, and smoking cessation given.  Provided spacer with instruction for use, care, and cleaning.

## 2019-11-19 NOTE — PROGRESS NOTES
Medications given this morning including Miralax and pt had an emesis episode post administration of approx 120 cc of green output--medicated with compazine per MAR which provided relief for patient.

## 2019-11-19 NOTE — CARE PLAN
Problem: Safety  Goal: Will remain free from falls  Outcome: PROGRESSING AS EXPECTED  Note:   Call light within reach, bed in the low and locked position, patient wearing non-slip socks, and rounded on hourly. Patient is steady on her feet and calls appropriately.     Problem: Knowledge Deficit  Goal: Knowledge of disease process/condition, treatment plan, diagnostic tests, and medications will improve  Outcome: PROGRESSING AS EXPECTED  Note:   Patient educated on medications, nursing interventions, and their diagnosis. Patient demonstrates interest in learning and being involved in her care.

## 2019-11-19 NOTE — PROGRESS NOTES
Hospital Medicine Daily Progress Note    Date of Service  11/19/2019    Chief Complaint  64 y.o. female admitted 11/15/2019 with shortness of breath.    Hospital Course    Patient with known lung cancer on immunotherapy, sent from oncologist's office when she was hypoxic.  She has no evidence of infectious process and was encouraged to remain hydrated and this likely lead to excessive hydration and oxygen needs.  She has responded favorably to diuresis and oxygen demand drastically decreased.  Echo completed and no evidence of heart failure.        Interval Problem Update  Patient states she feels overall better but remains tired.  She was receiving COPD education when evaluated.  She is still requiring 0.5L oxygen to maintain saturation and with continued diuresis, should be able to wean from this.    Consultants/Specialty  none    Code Status  full    Disposition  Home soon.    Review of Systems  Review of Systems   Constitutional: Positive for malaise/fatigue. Negative for chills and fever.   HENT: Negative for congestion and sore throat.    Eyes: Negative for blurred vision and photophobia.   Respiratory: Negative for cough and shortness of breath.    Cardiovascular: Negative for chest pain, claudication and leg swelling.   Gastrointestinal: Negative for abdominal pain, constipation, diarrhea, heartburn, nausea and vomiting.   Genitourinary: Negative for dysuria and hematuria.   Musculoskeletal: Negative for joint pain and myalgias.   Skin: Negative for itching and rash.   Neurological: Negative for dizziness, sensory change, speech change, weakness and headaches.   Psychiatric/Behavioral: Negative for depression. The patient is not nervous/anxious and does not have insomnia.         Physical Exam  Temp:  [36.5 °C (97.7 °F)-37.3 °C (99.1 °F)] 36.5 °C (97.7 °F)  Pulse:  [] 109  Resp:  [16-18] 17  BP: (105-123)/(56-77) 119/77  SpO2:  [93 %-95 %] 95 %    Physical Exam  Vitals signs and nursing note reviewed.    Constitutional:       General: She is not in acute distress.     Appearance: Normal appearance. She is not ill-appearing.   HENT:      Head: Normocephalic and atraumatic.      Nose: Nose normal.   Eyes:      General: No scleral icterus.  Neck:      Musculoskeletal: Neck supple.   Cardiovascular:      Rate and Rhythm: Normal rate and regular rhythm.      Heart sounds: Normal heart sounds. No murmur.   Pulmonary:      Effort: Pulmonary effort is normal.      Breath sounds: Normal breath sounds.   Abdominal:      General: Bowel sounds are normal. There is no distension.      Palpations: Abdomen is soft.   Musculoskeletal:         General: No swelling or tenderness.   Skin:     General: Skin is warm and dry.   Neurological:      General: No focal deficit present.      Mental Status: She is alert and oriented to person, place, and time.   Psychiatric:         Mood and Affect: Mood normal.         Fluids    Intake/Output Summary (Last 24 hours) at 11/19/2019 1453  Last data filed at 11/19/2019 1200  Gross per 24 hour   Intake 250 ml   Output 1600 ml   Net -1350 ml       Laboratory  Recent Labs     11/17/19  0028 11/18/19  0024 11/19/19  0047   WBC 5.6 4.2* 4.9   RBC 2.43* 2.66* 3.07*   HEMOGLOBIN 7.9* 8.5* 9.9*   HEMATOCRIT 24.2* 26.9* 30.9*   MCV 99.6* 101.1* 100.7*   MCH 32.5 32.0 32.2   MCHC 32.6* 31.6* 32.0*   RDW 58.1* 61.1* 66.8*   PLATELETCT 324 373 430   MPV 9.0 9.2 9.0     Recent Labs     11/17/19  0028 11/18/19  0024 11/19/19  0047   SODIUM 141 138 141   POTASSIUM 3.9 3.9 4.4   CHLORIDE 102 99 97   CO2 34* 32 34*   GLUCOSE 106* 106* 105*   BUN 9 16 19   CREATININE 0.60 0.55 0.74   CALCIUM 8.7 9.0 9.5                   Imaging  EC-ECHOCARDIOGRAM COMPLETE W/O CONT   Final Result      CT-CTA CHEST PULMONARY ARTERY W/ RECONS   Final Result      1.  No CT evidence of pulmonary embolism.      2.  Cavitary mass in the left lower pulmonary lobe is identified. Consider lung carcinoma. Infectious lesion is also  possible.      3.  Emphysema and peripheral areas of pulmonary fibrosis.      4.  Mild left hilar and mediastinal adenopathy. Tumor involvement of these nodes is a possibility.            DX-CHEST-PORTABLE (1 VIEW)   Final Result         Spiculated mass in the left mid lung could relate to known malignancy.      Mild scattered hazy opacities throughout both lungs. Subtle superimposed infection cannot be excluded.           Assessment/Plan  * Acute respiratory failure with hypoxia (HCC)- (present on admission)  Assessment & Plan  11/18 - O2 demand holding at 0.5L, continuing diuresis plan.  Breathing better  11/19 - continue diuresis, likely able to wean from oxygen by tomorrow      Lung cancer, primary, with metastasis from lung to other site (HCC)- (present on admission)  Assessment & Plan  On biweekly Durvalumab as outpatient  Follow up with oncology      Elevated brain natriuretic peptide (BNP) level- (present on admission)  Assessment & Plan  Overhydration likely, EF normal on Echo without any evidence of heart failure  Responded well to diuresis        Hypokalemia- (present on admission)  Assessment & Plan  Resolved with IV potassium           Macrocytic anemia- (present on admission)  Assessment & Plan  Mild, no evidence of bleeding        COPD (chronic obstructive pulmonary disease) (HCC)- (present on admission)  Assessment & Plan  No acute exacerbation, should fill her outpatient meds as prescribed.           VTE prophylaxis: lovenox.

## 2019-11-19 NOTE — DISCHARGE PLANNING
Care Transition Team Assessment      Anticipated Discharge Disposition: Home    Action: RN CM assessed pt at bedside and verified facesheet demographics.  Pt reports she lives alone in an apartment complex in [place].  She is independent with ADLs and IADLs and reports using no DME. Pt is not employed, has prescription drug coverage, uses [facility] at [place], and reports no financial barriers at this time. Pt currently has no PCP. Pt anticipates discharging home with no needs when medically cleared.     Barriers to Discharge: Medical clearance for discharge pending.    Plan: Await medical clearance for discharge home.    Information Source  Orientation : Oriented x 4  Information Given By: Patient  Who is responsible for making decisions for patient? : Patient    Readmission Evaluation  Is this a readmission?: No    Elopement Risk  Legal Hold: No  Ambulatory or Self Mobile in Wheelchair: Yes  Disoriented: No  Psychiatric Symptoms: None  History of Wandering: No  Elopement this Admit: No  Vocalizing Wanting to Leave: No  Displays Behaviors, Body Language Wanting to Leave: No-Not at Risk for Elopement  Elopement Risk: Not at Risk for Elopement    Interdisciplinary Discharge Planning  Does Admitting Nurse Feel This Could be a Complex Discharge?: No  Primary Care Physician: None  Lives with - Patient's Self Care Capacity: Alone and Able to Care For Self  Patient or legal guardian wants to designate a caregiver (see row info): No  Support Systems: Children  Housing / Facility: 1 Story Apartment / Condo  Do You Take your Prescribed Medications Regularly: Yes  Able to Return to Previous ADL's: Yes  Mobility Issues: Yes  Prior Services: None  Patient Expects to be Discharged to:: Home  Assistance Needed: Unknown at this Time  Durable Medical Equipment: Not Applicable    Discharge Preparedness  What is your plan after discharge?: Home with help  What are your discharge supports?: Child  Prior Functional Level: Ambulatory,  Drives Self, Independent with Activities of Daily Living, Independent with Medication Management  Difficulity with ADLs: None  Difficulity with IADLs: None    Functional Assesment  Prior Functional Level: Ambulatory, Drives Self, Independent with Activities of Daily Living, Independent with Medication Management    Finances  Financial Barriers to Discharge: No  Prescription Coverage: Yes    Vision / Hearing Impairment  Vision Impairment : Yes  Right Eye Vision: Wears Glasses, Impaired  Left Eye Vision: Impaired, Wears Glasses  Hearing Impairment : No         Advance Directive  Advance Directive?: None    Domestic Abuse  Have you ever been the victim of abuse or violence?: No  Physical Abuse or Sexual Abuse: No  Verbal Abuse or Emotional Abuse: No  Possible Abuse Reported to:: Not Applicable         Discharge Risks or Barriers  Discharge risks or barriers?: No PCP, Uninsured / underinsured  Patient risk factors: No PCP, Uninsured or underinsured    Anticipated Discharge Information  Anticipated discharge disposition: Home  Discharge Address: [facility] [address], [place]  Discharge Contact Phone Number: [phone]

## 2019-11-19 NOTE — PROGRESS NOTES
[facility] Medicine Daily Progress Note    Date of Service: 11/18/2019 Code Status: Full Code     Chief Complaint  Chief Complaint   Patient presents with   • Tired   • Weakness   • Shortness of Breath       Consultants/Specialty: None Disposition: Remain IP     Hospital Course     ER Course  Michelle Billingsley is a 64 y.o. female with a history of metastatic lung cancer who presents to the Emergency Department for evaluation of fatigue, weakness and shortness of breath. She is also endorsing a cough with sputum production, but suspects that this is secondary to her cancer treatment as she has been coughing intermittently for months, and notes that the cough started when her treatment started. Her last cancer treatment was four weeks ago. Today, she is here as she was hypoxic at her oncologists office and was saturating around 85% on room air. Given concerns over a possible infection causing the symptoms and her weakened immune system, she was sent here for evaluation. She denies any fevers, chills, nausea, vomiting or hemoptysis.  Admission Course  She does complain of a low-grade fever as well as a cough with clear mucus with occasional red streaks.  She has noted pain with swallowing since radiation  11/16 - Patient consuming excessive volumes of water, started since her recent admission at [facility], where she was instructed to push fluids and stay hydrated.  Stopped IVF, started 20mg lasix iv bid, O2 down from 2 to 1 this evening, down 1.1K after first dose.  Echo ordered for possible cardiotoxicity.  11/17 - Echo completed today, not resulted, diuresis slowed after first dose, increased to 40mg iv bid.  Decreased O2 need quite a bit, but may still need O2 while ambulating.  11/18 - Patient with 5.8L out so far, weights not measured.  Breathing better.  Feeling better, no more of a cough problem.  Continued IV diuresis       Interval Problem Update  Patient was seen and evaluated today for hypoxia at doctor's office,  tired, weak, sob      Review of Systems  Review of Systems   Constitutional: Positive for fever and malaise/fatigue. Negative for chills.   Respiratory: Positive for cough, sputum production and shortness of breath.    Cardiovascular: Negative for chest pain and palpitations.   Skin: Negative for itching and rash.   Psychiatric/Behavioral: Negative for depression. The patient is not nervous/anxious.     Physical Exam  Physical Exam   Constitutional: She is oriented to person, place, and time. No distress.   Neurological: She is alert and oriented to person, place, and time.   Skin: Skin is warm and dry. She is not diaphoretic.   Psychiatric: She has a normal mood and affect. Her behavior is normal. Judgment and thought content normal.   Nursing note and vitals reviewed.       I/Os    Intake/Output Summary (Last 24 hours) at 11/18/2019 2006  Last data filed at 11/18/2019 1700  Gross per 24 hour   Intake --   Output 2850 ml   Net -2850 ml    Vital Signs  Temp:  [36.2 °C (97.1 °F)-36.6 °C (97.8 °F)] 36.6 °C (97.8 °F)  Pulse:  [74-95] 87  Resp:  [18] 18  BP: (103-128)/(60-74) 105/62  SpO2:  [93 %-99 %] 93 %     Laboratory  Results from last 7 days   Lab Units 11/18/19  0024 11/17/19  0028   WBC 1501 K/uL 4.2* 5.6   HGB 1503 g/dL 8.5* 7.9*   HCT 1504 % 26.9* 24.2*   PLATELET COUNT 1518 K/uL 373 324    Results from last 7 days   Lab Units 11/18/19  0024 11/17/19  0028   SODIUM 101 mmol/L 138 141   POTASSIUM 102 mmol/L 3.9 3.9   CHLORIDE 103 mmol/L 99 102   CO2 104 mmol/L 32 34*   ANION GAP ANION  7.0 5.0    mg/dL 16 9   CREATININE 109 mg/dL 0.55 0.60   CALCIUM 105 mg/dL 9.0 8.7   GLUCOSE 112 mg/dL 106* 106*   IF GEORGE AMER [id number] mL/min/1.73 m 2 >60 >60   IF NON AFRICAN AMER [id number] mL/min/1.73 m 2 >60 >60           Medications  furosemide, 40 mg, Intravenous, BID DIURETIC  potassium chloride SA, 20 mEq, Oral, BID  senna-docusate, 2 Tab, Oral, BID  enoxaparin, 40 mg, Subcutaneous, DAILY  Pharmacy Consult Request,  "1 Each, Other, PHARMACY TO DOSE         Medications were reviewed today.      Assessment/Plan  * Acute respiratory failure with hypoxia (HCC)- (present on admission)  Assessment & Plan  11/18 - O2 demand holding at 0.5L, continuing diuresis plan.  Breathing better  11/17 - Patient decreasing her O2 need while resting, but had a desaturation with ambulation episode today off O2.  Will likely need home O2 and continued diuresis while inpatient.  Echo completed, results pending.  11/16 - Patient has been consuming excessive volumes of water \"staying hydrated\" and states she was fine until a recent hospitalization at [facility] where nursing kept telling her to drink lots of fluids.  Now she finds herself on oxygen.  BNP very midly elevated, echo order placed, 20mg iv lasix bid ordered.  Patient down 1.1 k since first dose of lasix.  Down from initial 2L on presentation to 1L this evening O2 support.  11/15 - adm - At this time, I feel this is likely due to combination of COPD, lung cancer as well as recent radiation. -I did personally review her CT of the chest, noted significant chronic changes as well as a left sided mass, no pneumonia noted. -Obtain procalcitonin but no antibiotics will be started at this time. -Continue oxygen, wean as able. -She did just get her immunotherapy, it is less likely but there is the potential for pneumonitis, if no improvement, consider high-dose steroids    Lung cancer, primary, with metastasis from lung to other site (HCC)- (present on admission)  Assessment & Plan  11/16-11/18 - Active chemo process going on right now, causitive etiology for her anemia  11/15 - adm - Continue outpatient follow-up and treatment    Elevated brain natriuretic peptide (BNP) level- (present on admission)  Assessment & Plan  11/18 - Echo exactly as expected, minus the diastolic dysfunction as cannot calculate.  Continuing diuresis plan to get fluid off her body.  Responding well to 40mg bid IV now.  Would " stop after tomorrows AM dose.   11/17 - echo completed, not resulted.  Decreased resting O2 demand.  Increased lasix to 40mg bid from 20mg as diuresis slowed after first dose.  11/16 - mildly elevated on admission. Independent Read of CXR: Visualization of pulmonary vasculature symetrically with cephalization and interstitial edema, noted spiculated mass on the left included in formal read.  Ordered Echo, fully expect EF50-60%, grade1-2 diastolic dysfunciton and 35-45mm RVSP or pulmonary pressure consistent with overhydration. patient consuming excessive volumes of water per instructions to push fluids and stay hydrated.  This erroneous guidance by staff at other hospital has resulted in a hospitalization for overhydration.  Echo must be ordered as her chemo can be cardiotoxic, but this is not a heart failure exacerbation of any kind.  This is an overhydration from erroneous guidance provided by healthcare staff exacerbation.      Recent Labs     11/15/19  1629   NTPROBNP 235*         Hypokalemia- (present on admission)  Assessment & Plan  11/16-11/18 - Resolved with IV potassium  11/15 - adm - Place IV potassium and with IV fluids. -Repeat BMP in the morning    Results from last 7 days   Lab Units 11/18/19  0024 11/17/19  0028 11/16/19  0006 11/15/19  1629   POTASSIUM 102 mmol/L 3.9 3.9 3.8 3.5*   CREATININE 109 mg/dL 0.55 0.60 0.59 0.61   IF GEORGE AMER [id number] mL/min/1.73 m 2 >60 >60 >60 >60   IF NON AFRICAN AMER 109GFRB mL/min/1.73 m 2 >60 >60 >60 >60            Dehydration- (present on admission)  Assessment & Plan  11/17-11/18 - decreasing O2 demand with diuresis  11/16 - Stopped fluids.  She is plenty hydrated, overhydrating actually.  11/15 - adm - Start IV fluids. -Repeat BMP in the morning    Macrocytic anemia- (present on admission)  Assessment & Plan  11/16-11/18 - stable - patient on active chemo right now., can also be very much a dilutional component.  11/115 - adm - No sign of gross bleeding -Repeat  CBC in the morning    Results from last 7 days   Lab Units 11/18/19  0024 11/17/19  0028 11/16/19  0006   HGB 1503 g/dL 8.5* 7.9* 7.7*   HCT 1504 % 26.9* 24.2* 24.7*   MCV 1505 fL 101.1* 99.6* 102.9*   PLATELET COUNT 1518 K/uL 373 324 309     No results found for: FOLATE  No results found for: [id number]  No results found for: RETICCOUNT  No results found for: RETICABS  No results found for: IMRETICFR  No results found for: RETHGBEQ No results found for: FERRITIN  No results found for: TRANSFERRIN  No results found for: IRON  No results found for: TOTIRONBC  No results found for: SATURATION           COPD (chronic obstructive pulmonary disease) (HCC)- (present on admission)  Assessment & Plan  11/18 - Independent review of CT scan of chest, very consistent with COPD, peripheral fibrosis throughout, noted mass previously described.  Discussed patient should fill her rxs for the COPD medications.  11/16-11/17 - stable  11/15 - adm - No acute exacerbation       VTE prophylaxis: Lovenox    Imaging  EC-ECHOCARDIOGRAM COMPLETE W/O CONT   Final Result      CT-CTA CHEST PULMONARY ARTERY W/ RECONS   Final Result      1.  No CT evidence of pulmonary embolism.      2.  Cavitary mass in the left lower pulmonary lobe is identified. Consider lung carcinoma. Infectious lesion is also possible.      3.  Emphysema and peripheral areas of pulmonary fibrosis.      4.  Mild left hilar and mediastinal adenopathy. Tumor involvement of these nodes is a possibility.            DX-CHEST-PORTABLE (1 VIEW)   Final Result         Spiculated mass in the left mid lung could relate to known malignancy.      Mild scattered hazy opacities throughout both lungs. Subtle superimposed infection cannot be excluded.

## 2019-11-20 PROBLEM — K59.00 CONSTIPATION: Status: ACTIVE | Noted: 2019-01-01

## 2019-11-20 NOTE — CARE PLAN
Problem: Safety  Goal: Will remain free from falls  Outcome: PROGRESSING AS EXPECTED  Intervention: Implement fall precautions  Flowsheets  Taken 11/20/2019 1107  Bed Alarm: Yes - Alarm On  Chair/Bed Strip Alarm: Yes - Alarm On  Taken 11/20/2019 0800  Environmental Precautions: Treaded Slipper Socks on Patient;Bed in Low Position;Communication Sign for Patients & Families;Mobility Assessed & Appropriate Sign Placed;Personal Belongings, Wastebasket, Call Bell etc. in Easy Reach     Problem: Bowel/Gastric:  Goal: Will not experience complications related to bowel motility  Outcome: PROGRESSING SLOWER THAN EXPECTED  Intervention: Implement interventions to promote bowel evacuation if inadequate bowel movements in past 48 hours  Note:   Milk of magnesia given this AM.

## 2019-11-20 NOTE — PROGRESS NOTES
Assumed patient care at 0700. Pt is A&Ox4 and a standby assist. Bed alarm is on and in the low and locked position. Call light within reach, wearing non-slip socks, and rounded on hourly. Patient calls appropriately and is steady on her feet. Pt takes GI cocktail before meals and 10mg Oxy for throat pain.

## 2019-11-20 NOTE — PROGRESS NOTES
Assumed pt care @1900, bedside report received.   Pt assessed, no c/o pain/nausea at this time, VSS, PIV patent/flushed/SL. Pt on .5L via N/C-ambulated unit w/SBA-steady gait.   POC discussed, all questions answered at this time. Call light w/i reach, bed in lowest/locked position, Q2 rounding in place.

## 2019-11-20 NOTE — CARE PLAN
Problem: Pain Management  Goal: Pain level will decrease to patient's comfort goal  Outcome: PROGRESSING AS EXPECTED  Intervention: Follow pain managment plan developed in collaboration with patient and Interdisciplinary Team  Note:   Patient assessed and medicated for pain.      Problem: Bowel/Gastric:  Goal: Normal bowel function is maintained or improved  Outcome: PROGRESSING SLOWER THAN EXPECTED  Intervention: Educate patient and significant other/support system about signs and symptoms of constipation and interventions to implement  Note:   Miralax given

## 2019-11-20 NOTE — PROGRESS NOTES
Patient on 0.5 liter O2. Attempted to wean O2 and did home O2 study. Patient sating above 90% on room air at rest and when ambulating. Patient desatting to 86% when sleeping. Dr. Arriaga notified of this.

## 2019-11-20 NOTE — CARE PLAN
Problem: Safety  Goal: Will remain free from falls  Outcome: PROGRESSING AS EXPECTED  Note:   Call light within reach, bed in the low and locked position, patient wearing non-slip socks, and rounded on hourly. Patient calls appropriately and is steady on her feet.       Problem: Knowledge Deficit  Goal: Knowledge of disease process/condition, treatment plan, diagnostic tests, and medications will improve  Outcome: PROGRESSING AS EXPECTED  Note:   Patient educated on medications, nursing interventions, and their diagnosis. Patient demonstrates desire to be involved with her own care.

## 2019-11-21 NOTE — ASSESSMENT & PLAN NOTE
No bowel movement since Friday (11/15) per patient  Bowel protocol, hold discharge until bowel movement

## 2019-11-21 NOTE — PROGRESS NOTES
Patient being discharged. Discussed discharge instructions. Discussed flu shot and with being immunocompromised it would be a good idea. Patient refuses. VSS on RA.  No narcotics have been administered. Patient is driving self home. All questions have been answered. Patient showered and used bathroom prior to discharge.

## 2019-11-21 NOTE — DISCHARGE INSTRUCTIONS
Discharge Instructions    Discharged to home by car with relative. Discharged via wheelchair, hospital escort: Yes.  Special equipment needed: Not Applicable    Be sure to schedule a follow-up appointment with your primary care doctor or any specialists as instructed.     Discharge Plan:   Diet Plan: Discussed  Activity Level: Discussed  Confirmed Follow up Appointment: Patient to Call and Schedule Appointment  Confirmed Symptoms Management: Discussed  Medication Reconciliation Updated: Yes  Influenza Vaccine Indication: Patient Refuses    I understand that a diet low in cholesterol, fat, and sodium is recommended for good health. Unless I have been given specific instructions below for another diet, I accept this instruction as my diet prescription.         · Is patient discharged on Warfarin / Coumadin?   No     Depression / Suicide Risk    As you are discharged from this [facility] facility, it is important to learn how to keep safe from harming yourself.    Recognize the warning signs:  · Abrupt changes in personality, positive or negative- including increase in energy   · Giving away possessions  · Change in eating patterns- significant weight changes-  positive or negative  · Change in sleeping patterns- unable to sleep or sleeping all the time   · Unwillingness or inability to communicate  · Depression  · Unusual sadness, discouragement and loneliness  · Talk of wanting to die  · Neglect of personal appearance   · Rebelliousness- reckless behavior  · Withdrawal from people/activities they love  · Confusion- inability to concentrate     If you or a loved one observes any of these behaviors or has concerns about self-harm, here's what you can do:  · Talk about it- your feelings and reasons for harming yourself  · Remove any means that you might use to hurt yourself (examples: pills, rope, extension cords, firearm)  · Get professional help from the community (Mental Health, Substance Abuse, psychological  counseling)  · Do not be alone:Call your Safe Contact- someone whom you trust who will be there for you.  · Call your local CRISIS HOTLINE 600-1222 or 601-844-3068  · Call your local Children's Mobile Crisis Response Team [place] (857) 280-8797 or www.Jamgo  · Call the toll free National Suicide Prevention Hotlines   · National Suicide Prevention Lifeline 927-402-DQQJ (3998)  · National Hope Line Network 800-SUICIDE (294-5351)    Hypoxemia  Hypoxemia occurs when your blood does not contain enough oxygen. The body cannot work well when it does not have enough oxygen because every part of your body needs oxygen. Oxygen travels to all parts of the body through your blood. Hypoxemia can develop suddenly or can come on slowly.  CAUSES  Some common causes of hypoxemia include:  · Long-term (chronic) lung diseases, such as chronic obstructive pulmonary disease (COPD) or interstitial lung disease.   · Disorders that affect breathing at night, such as sleep apnea.  · Fluid buildup in your lungs (pulmonary edema).   · Lung infection (pneumonia).  · Lung or throat cancer.  · Abnormal blood flow that bypasses the lungs (shunt).  · Certain diseases that affect nerves or muscles.  · A collapsed lung (pneumothorax).  · A blood clot in the lungs (pulmonary embolus).  · Certain types of heart disease.  · Slow or shallow breathing (hypoventilation).   · Certain medicines.  · High altitudes.  · Toxic chemicals and gases.  SIGNS AND SYMPTOMS  Not everyone who has hypoxemia will develop symptoms. If the hypoxemia developed quickly, you will likely have symptoms such as shortness of breath. If the hypoxemia came on slowly over months or years, you may not notice any symptoms. Symptoms can include:  · Shortness of breath (dyspnea).  · Bluish color of the skin, lips, or nail beds.  · Breathing that is fast, noisy, or shallow.  · A fast heartbeat.  · Feeling tired or sleepy.  · Being confused or feeling  anxious.  DIAGNOSIS  To determine if you have hypoxemia, your health care provider may perform:  · A physical exam.  · Blood tests.  · A pulse oximetry. A sensor will be put on your finger, toe, or earlobe to measure the percent of oxygen in your blood.  TREATMENT  You will likely be treated with oxygen therapy. Depending on the cause of your hypoxemia, you may need oxygen for a short time (weeks or months), or you may need it indefinitely. Your health care provider may also recommend other therapies to treat the underlying cause of your hypoxemia.  HOME CARE INSTRUCTIONS  · Only take over-the-counter or prescription medicines as directed by your health care provider.  · Follow oxygen safety measures if you are on oxygen therapy. These may include:  ¨ Always having a backup supply of oxygen.  ¨ Not allowing anyone to smoke around oxygen.  ¨ Handling the oxygen tanks carefully and as instructed.  · If you smoke, quit. Stay away from people who smoke.  · Follow up with your health care provider as directed.  SEEK MEDICAL CARE IF:  · You have any concerns about your oxygen therapy.  · You still have trouble breathing.  · You become short of breath when you exercise.  · You are tired when you wake up.  · You have a headache when you wake up.  SEEK IMMEDIATE MEDICAL CARE IF:   · Your breathing gets worse.  · You have new shortness of breath with normal activity.  · You have a bluish color of the skin, lips, or nail beds.  · You have confusion or cloudy thinking.  · You cough up dark mucus.  · You have chest pain.  · You have a fever.  MAKE SURE YOU:  · Understand these instructions.  · Will watch your condition.  · Will get help right away if you are not doing well or get worse.  This information is not intended to replace advice given to you by your health care provider. Make sure you discuss any questions you have with your health care provider.  Document Released: 07/02/2012 Document Revised: 12/23/2014 Document  Reviewed: 07/17/2014  [employer] Interactive Patient Education © 2017 [employer]

## 2019-11-21 NOTE — PROGRESS NOTES
[facility] Medicine Daily Progress Note    Date of Service  11/20/2019    Chief Complaint  64 y.o. female admitted 11/15/2019 with shortness of breath.    Hospital Course    Patient with known lung cancer on immunotherapy, sent from oncologist's office when she was hypoxic.  She has no evidence of infectious process and was encouraged to remain hydrated and this likely lead to excessive hydration and oxygen needs.  She has responded favorably to diuresis and oxygen demand drastically decreased.  Echo completed and no evidence of heart failure.        Interval Problem Update  11/19 Patient states she feels overall better but remains tired.  She was receiving COPD education when evaluated.  She is still requiring 0.5L oxygen to maintain saturation and with continued diuresis, should be able to wean from this.  11/20 Patient states she is feeling better but hasn't been able to have a bowel movement since Friday - day of admission.  She had this problem before most recently on a prolonged admission from [facility] and is afraid she cannot take care of this problem at home - will continue with aggressive bowel protocol and hold discharge home until she is successful with having a bowel movement.    Consultants/Specialty  none    Code Status  full    Disposition  Home after bowel movement.    Review of Systems  Review of Systems   Constitutional: Positive for malaise/fatigue. Negative for chills and fever.   HENT: Negative for congestion and sore throat.    Eyes: Negative for blurred vision and photophobia.   Respiratory: Negative for cough and shortness of breath.    Cardiovascular: Negative for chest pain, claudication and leg swelling.   Gastrointestinal: Negative for abdominal pain, constipation, diarrhea, heartburn, nausea and vomiting.   Genitourinary: Negative for dysuria and hematuria.   Musculoskeletal: Negative for joint pain and myalgias.   Skin: Negative for itching and rash.   Neurological: Negative for  dizziness, sensory change, speech change, weakness and headaches.   Psychiatric/Behavioral: Negative for depression. The patient is not nervous/anxious and does not have insomnia.         Physical Exam  Temp:  [36.1 °C (97 °F)-36.5 °C (97.7 °F)] 36.5 °C (97.7 °F)  Pulse:  [] 102  Resp:  [16-18] 18  BP: (105-119)/(68-73) 109/68  SpO2:  [91 %-95 %] 91 %    Physical Exam  Vitals signs and nursing note reviewed.   Constitutional:       General: She is not in acute distress.     Appearance: Normal appearance. She is not ill-appearing.   HENT:      Head: Normocephalic and atraumatic.      Nose: Nose normal.   Eyes:      General: No scleral icterus.  Neck:      Musculoskeletal: Neck supple.   Cardiovascular:      Rate and Rhythm: Normal rate and regular rhythm.      Heart sounds: Normal heart sounds. No murmur.   Pulmonary:      Effort: Pulmonary effort is normal.      Breath sounds: Normal breath sounds.   Abdominal:      General: Bowel sounds are normal. There is no distension.      Palpations: Abdomen is soft.   Musculoskeletal:         General: No swelling or tenderness.   Skin:     General: Skin is warm and dry.   Neurological:      General: No focal deficit present.      Mental Status: She is alert and oriented to person, place, and time.   Psychiatric:         Mood and Affect: Mood normal.         Fluids    Intake/Output Summary (Last 24 hours) at 11/20/2019 1715  Last data filed at 11/20/2019 1659  Gross per 24 hour   Intake 820 ml   Output 1700 ml   Net -880 ml       Laboratory  Recent Labs     11/18/19  0024 11/19/19  0047 11/20/19  0014   WBC 4.2* 4.9 7.0   RBC 2.66* 3.07* 3.11*   HEMOGLOBIN 8.5* 9.9* 10.1*   HEMATOCRIT 26.9* 30.9* 30.9*   .1* 100.7* 99.4*   MCH 32.0 32.2 32.5   MCHC 31.6* 32.0* 32.7*   RDW 61.1* 66.8* 67.3*   PLATELETCT 373 430 448*   MPV 9.2 9.0 9.0     Recent Labs     11/18/19  0024 11/19/19  0047 11/20/19  0014   SODIUM 138 141 139   POTASSIUM 3.9 4.4 3.8   CHLORIDE 99 97 95*    CO2 32 34* 35*   GLUCOSE 106* 105* 124*   BUN 16 19 26*   CREATININE 0.55 0.74 0.71   CALCIUM 9.0 9.5 9.6                   Imaging  EC-ECHOCARDIOGRAM COMPLETE W/O CONT   Final Result      CT-CTA CHEST PULMONARY ARTERY W/ RECONS   Final Result      1.  No CT evidence of pulmonary embolism.      2.  Cavitary mass in the left lower pulmonary lobe is identified. Consider lung carcinoma. Infectious lesion is also possible.      3.  Emphysema and peripheral areas of pulmonary fibrosis.      4.  Mild left hilar and mediastinal adenopathy. Tumor involvement of these nodes is a possibility.            DX-CHEST-PORTABLE (1 VIEW)   Final Result         Spiculated mass in the left mid lung could relate to known malignancy.      Mild scattered hazy opacities throughout both lungs. Subtle superimposed infection cannot be excluded.           Assessment/Plan  * Acute respiratory failure with hypoxia (HCC)- (present on admission)  Assessment & Plan  11/18 - O2 demand holding at 0.5L, continuing diuresis plan.  Breathing better  11/19 - continue diuresis, likely able to wean from oxygen by tomorrow  11/20 - weaned from oxygen      Lung cancer, primary, with metastasis from lung to other site (HCC)- (present on admission)  Assessment & Plan  On biweekly Durvalumab as outpatient  Follow up with oncology      Constipation  Assessment & Plan  No bowel movement since Friday (11/15) per patient  Bowel protocol, hold discharge until bowel movement       Elevated brain natriuretic peptide (BNP) level- (present on admission)  Assessment & Plan  Overhydration likely, EF normal on Echo without any evidence of heart failure  Responded well to diuresis        Hypokalemia- (present on admission)  Assessment & Plan  Resolved with IV potassium           Macrocytic anemia- (present on admission)  Assessment & Plan  Mild, no evidence of bleeding        COPD (chronic obstructive pulmonary disease) (HCC)- (present on admission)  Assessment & Plan  No  acute exacerbation, should fill her outpatient meds as prescribed.         VTE prophylaxis: lovenox.

## 2019-11-21 NOTE — DISCHARGE SUMMARY
Discharge Summary    CHIEF COMPLAINT ON ADMISSION  Chief Complaint   Patient presents with   • Tired   • Weakness   • Shortness of Breath       Reason for Admission  Low O2     Admission Date  11/15/2019    CODE STATUS  Prior    HPI & HOSPITAL COURSE  This is a 64 y.o. female here with fatigue and shortness of breath.  She has known lung cancer on immunotherapy, sent from oncologist's office when she was hypoxic.  She has no evidence of infectious process and was encouraged to remain very hydrated and this likely lead to excessive hydration and increased oxygen needs.  She has responded favorably to diuresis and oxygen demand drastically decreased.  Echo completed and no evidence of heart failure.   She has weaned completely from oxygen supplementation and had resolution of constipation prior to discharge.    Therefore, she is discharged in good and stable condition to home with close outpatient follow-up.    The patient met 2-midnight criteria for an inpatient stay at the time of discharge.    Discharge Date  11/21/2019    FOLLOW UP ITEMS POST DISCHARGE  11/21/19    DISCHARGE DIAGNOSES  Principal Problem:    Acute respiratory failure with hypoxia (HCC) POA: Yes  Active Problems:    Lung cancer, primary, with metastasis from lung to other site (HCC) POA: Yes    Hypokalemia POA: Yes    Elevated brain natriuretic peptide (BNP) level POA: Yes    Constipation POA: Unknown    COPD (chronic obstructive pulmonary disease) (HCC) POA: Yes    Macrocytic anemia POA: Yes  Resolved Problems:    * No resolved hospital problems. *      FOLLOW UP  No future appointments.  Raji Alaniz M.D.  [address] [place] [address] [address] [place] [id number] [phone]    Schedule an appointment as soon as possible for a visit  Post hospitalization follow-up for COPD    aMhi Callahan M.D.  [address] [place] [id number] [phone]    In 1 week  chemo      MEDICATIONS ON DISCHARGE     Medication List      CONTINUE taking these  medications      Instructions   DURVALUMAB IV   1 Each by Intravenous route every 14 days.  Dose:  1 Each     Magnesium 400 MG Tabs   Take 400 mg by mouth every day.  Dose:  400 mg     MULTIVITAMIN PO   Take 1 Tab by mouth every day.  Dose:  1 Tab     oxyCODONE immediate-release 5 MG Tabs  Commonly known as:  ROXICODONE   Take 5 mg by mouth every 6 hours as needed for Severe Pain.  Dose:  5 mg     tramadol 50 MG Tabs  Commonly known as:  ULTRAM   Take 50 mg by mouth every four hours as needed for Moderate Pain.  Dose:  50 mg     Vitamin D-3 5000 units Tabs   Take 5,000 Units by mouth every day.  Dose:  5,000 Units            Allergies  No Known Allergies    DIET  No orders of the defined types were placed in this encounter.      ACTIVITY  As tolerated.  Weight bearing as tolerated    CONSULTATIONS  none    PROCEDURES  none    LABORATORY  Lab Results   Component Value Date    SODIUM 139 11/20/2019    POTASSIUM 3.8 11/20/2019    CHLORIDE 95 (L) 11/20/2019    CO2 35 (H) 11/20/2019    GLUCOSE 124 (H) 11/20/2019    BUN 26 (H) 11/20/2019    CREATININE 0.71 11/20/2019        Lab Results   Component Value Date    WBC 7.0 11/20/2019    HEMOGLOBIN 10.1 (L) 11/20/2019    HEMATOCRIT 30.9 (L) 11/20/2019    PLATELETCT 448 (H) 11/20/2019        Total time of the discharge process exceeds 35 minutes.

## 2019-11-21 NOTE — CARE PLAN
Problem: Safety  Goal: Will remain free from falls  Outcome: PROGRESSING AS EXPECTED  Intervention: Implement fall precautions  Flowsheets  Taken 11/20/2019 1107 by Traci Aguiar R.N.  Bed Alarm: Yes - Alarm On  Chair/Bed Strip Alarm: Yes - Alarm On  Taken 11/20/2019 0800 by Traci Aguiar R.N.  Environmental Precautions: Treaded Slipper Socks on Patient;Bed in Low Position;Communication Sign for Patients & Families;Mobility Assessed & Appropriate Sign Placed;Personal Belongings, Wastebasket, Call Bell etc. in Easy Reach  Note:   Fall precautions in place, patient calling appropriately for assistance.      Problem: Bowel/Gastric:  Goal: Will not experience complications related to bowel motility  Outcome: PROGRESSING SLOWER THAN EXPECTED  Intervention: Implement interventions to promote bowel evacuation if inadequate bowel movements in past 48 hours  Note:   Patient given interventions to promote motility.

## 2019-11-21 NOTE — PROGRESS NOTES
Assumed pt care @1900, bedside report received.   Pt assessed, no c/o pain/nausea at this time, VSS, PIV patent/flushed/SL. Pt ambulated unit today w/SBA-steady gait.   POC discussed, all questions answered at this time. Call light w/i reach, bed in lowest/locked position, Q2 rounding in place.

## 2019-12-09 PROBLEM — K59.00 CONSTIPATION: Status: RESOLVED | Noted: 2019-01-01 | Resolved: 2019-01-01

## 2019-12-09 PROBLEM — I27.20 PULMONARY HTN (HCC): Status: ACTIVE | Noted: 2019-01-01

## 2019-12-09 PROBLEM — E87.6 HYPOKALEMIA: Status: RESOLVED | Noted: 2019-01-01 | Resolved: 2019-01-01

## 2019-12-09 PROBLEM — R79.89 LOW VITAMIN D LEVEL: Status: ACTIVE | Noted: 2019-01-01

## 2019-12-09 PROBLEM — R73.9 HYPERGLYCEMIA: Status: ACTIVE | Noted: 2019-01-01

## 2019-12-09 PROBLEM — R79.89 ELEVATED BRAIN NATRIURETIC PEPTIDE (BNP) LEVEL: Status: RESOLVED | Noted: 2019-01-01 | Resolved: 2019-01-01

## 2019-12-09 PROBLEM — J96.01 ACUTE RESPIRATORY FAILURE WITH HYPOXIA (HCC): Status: RESOLVED | Noted: 2019-01-01 | Resolved: 2019-01-01

## 2019-12-09 PROBLEM — J98.4 CAVITATING MASS IN LEFT LOWER LUNG LOBE: Status: ACTIVE | Noted: 2019-01-01

## 2019-12-09 NOTE — ASSESSMENT & PLAN NOTE
Echo November 2019:  Right ventricular systolic pressure is estimated to be 45 mmHg  consistent with mild pulmonary hypertension.

## 2019-12-09 NOTE — ASSESSMENT & PLAN NOTE
from the cancer, based on the CT lung November 2019:  Cavitary mass in the left lower pulmonary lobe

## 2019-12-09 NOTE — PROGRESS NOTES
New Patient with complete physical exam    Chief Complaint   Patient presents with   • Referral Needed     to pulmonary, Naturopathic dietician    • Establish Care       Subjective:     History of Present Illness: Patient is a 64 y.o. female who is here today to establish primary care, post hospital follow-up, probably referral.  History also provided by her daughter.    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  -Patient had chemotherapy infusion, 2 cycles, 3 days each as well as radiation started September 19, 2019 for 6-week (Monday-Friday).  -Mention side effect from chemo was neutropenia, early November 2019 admitted in [place] for 5 days, mentioned white BC 8.5 and platelet of 6.    -She is currently on immunotherapy (DURVALUMAB IV) biweekly for 1 year, so far had 2 sessions.  -Oncologist Dr. Dr. Mahi Callahan from cancer care specialis, radiation oncologist is Dr. Sanders    -Patient was admitted in renal November 2019 for shortness of breath and hypoxia, patient was secondary to overflowed from previous admitted admission when she had neutropenia and low platelets (mention received a lot of IV fluid during that admission).    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     CT lung November 2019:  Cavitary mass in the left lower pulmonary lobe>> likely cavitation from the cancer    COPD (chronic obstructive pulmonary disease) (HCC)  Other emphysema (HCC)   Pulmonary HTN (HCC)    CT lung November  2019:  Emphysema and peripheral areas of pulmonary fibrosis.    Echo November 2019:  Right ventricular systolic pressure is estimated to be 45 mmHg  consistent with mild pulmonary hypertension.    -Mention of having shortness of breath on exertion, it is stable and is not getting worse, unable to walk for 1 block secondary to shortness of breath, sometimes has cough, clear phlegm as well as sometimes wheezing.    -Patient had PFT done in [place] November thousand 19.        Current Outpatient Medications on File Prior to Visit   Medication Sig Dispense Refill   • oxyCODONE immediate-release (ROXICODONE) 5 MG Tab Take 5 mg by mouth every 6 hours as needed for Severe Pain.     • Multiple Vitamins-Minerals (MULTIVITAMIN PO) Take 1 Tab by mouth every day.     • Magnesium 400 MG Tab Take 400 mg by mouth every day.     • DURVALUMAB IV 1 Each by Intravenous route every 14 days.       No current facility-administered medications on file prior to visit.      No Known Allergies  Patient Active Problem List    Diagnosis Date Noted   • Lung cancer, primary, with metastasis from lung to other site (HCC) 11/16/2019     Priority: High   • COPD (chronic obstructive pulmonary disease) (HCC) 11/16/2019     Priority: Low   • Macrocytic anemia 11/16/2019     Priority: Low   • Cavitating mass in left lower lung lobe 12/09/2019   • Pulmonary HTN (HCC) 12/09/2019   • Low vitamin D level 12/09/2019   • Hyperglycemia 12/09/2019     Past Medical History:   Diagnosis Date   • Acute respiratory failure with hypoxia (HCC) 11/16/2019   • Cancer (HCC)    • Constipation 11/20/2019   • COPD (chronic obstructive pulmonary disease) (HCC)    • Elevated brain natriuretic peptide (BNP) level 11/16/2019   • Hypokalemia 11/16/2019   • Lung cancer, primary, with metastasis from lung to other site (HCC)      Past Surgical History:   Procedure Laterality Date   • BUNIONECTOMY     • PRIMARY C SECTION       Family History   Problem Relation Age of Onset   •  "Cancer Mother         lung   • Other Mother         Addision disease   • Cancer Father         lung   • Hypertension Father    • No Known Problems Sister    • Lung Disease Brother         emphysema     Social History     Tobacco Use   • Smoking status: Former Smoker     Packs/day: 0.00     Last attempt to quit: 11/15/2012     Years since quittin.0   • Smokeless tobacco: Former User   Substance Use Topics   • Alcohol use: Never     Frequency: Never   • Drug use: Never       ROS:     - Constitutional: Negative for fever, chills    - HEENT: Negative for headaches, vision changes, hearing changes, ear pain, sore throat, and neck pain.      - Cardiovascular: Negative for chest pain, palpitations, orthopnea, and bilateral lower extremity edema.     - Gastrointestinal: Negative for heartburn, nausea, vomiting, abdominal pain, hematochezia, melena, diarrhea, constipation, and greasy/foul-smelling stools.     - Genitourinary: Negative for dysuria, polyuria, hematuria, pyuria, urinary urgency, and urinary incontinence.    - Musculoskeletal: Negative for myalgias, back pain, and joint pain.     - Skin: Negative for rash, itching, cyanotic color change.     - Neurological: Negative for dizziness, tingling, tremors, focal sensory deficit, focal weakness and headaches.     - Psychiatric/Behavioral: Negative for depression, suicidal/homicidal ideation and memory loss.       Physical Exam:     /64 (BP Location: Left arm, Patient Position: Sitting, BP Cuff Size: Adult)   Pulse 94   Temp 36.5 °C (97.7 °F) (Temporal)   Ht 1.727 m (5' 8\")   Wt 95.3 kg (210 lb)   SpO2 93%   BMI 31.93 kg/m²   General: Normal appearing. No distress.  HENT: Normocephalic. Ears normal shape and contour, oropharynx is without erythema, edema or exudates.    Eyes: conjunctiva clear lids without ptosis, pupils equal and reactive to light accommodation  Neck: Supple without JVD or bruit. Thyroid is not enlarged.  Pulmonary: Has some harsh lung " sounds at the left lower base on inspiration,.  Normal effort. No rales, ronchi, or wheezing.  Cardiovascular: Regular rate and rhythm without murmur. Radial pulses are intact, regular and symmetrical bilaterally.  Abdomen: Soft, nontender, nondistended. Normal bowel sounds. Liver and spleen are not palpable.  Lymph: No cervical, submandibular or supraclavicular lymph nodes are palpable  Psych: Normal mood and affect. Alert and oriented x3    Note: I have reviewed pertinent labs and diagnostic tests associated with this visit with specific comments listed under the assessment and plan below      Assessment and Plan:       Cavitating mass in left lower lung lobe  Primary malignant neoplasm of left lung metastatic to other site (HCC)    - CT lung November 2019:  Cavitary mass in the left lower pulmonary lobe>> likely cavitation from the cancer  -Advised to sign to release form from oncology office.    COPD (chronic obstructive pulmonary disease) (HCC)  Other emphysema (HCC)   Pulmonary HTN (HCC)    CT lung November 2019:  Emphysema and peripheral areas of pulmonary fibrosis.    Echo November 2019:  Right ventricular systolic pressure is estimated to be 45 mmHg  consistent with mild pulmonary hypertension.  -Patient had PFT done in [place] November thousand 19.    - REFERRAL TO PULMONOLOGY  - Pneumococal Polysaccharide Vaccine 23-Valent =>3YO SQ/IM  - Influenza Vaccine Quad Injection (PF)  -Was prescribed Spiriva as well as Wixela Inhub (Fluticasone Propionate and Salmeterol inhaler from [facility] before however patient never used.  -Advised to use inhalers and follow-up with pulmonary.  -Advised to sign to get records from [place] (PFT).    Low vitamin D level  - VITAMIN D,25 HYDROXY; Future     Need for vaccination  - Pneumococal Polysaccharide Vaccine 23-Valent =>3YO SQ/IM  - Influenza Vaccine Quad Injection (PF)    Screen for colon cancer  - REFERRAL TO GI FOR COLONOSCOPY>> could be done next year later after  become well stable from lung cancer treatment  -Mention had fit test September 2019 and was negative.  -Mention had colonoscopy 2006, for polyps, benign.      Macrocytic anemia  -Likely secondary from bone marrow suppression due to chemotherapy.  - VIT B12,  FOLIC ACID  - RETICULOCYTES COUNT; Future  -Denies apparent GI bleeding, melena, hematuria, hemoptysis.      Hyperglycemia  - HEMOGLOBIN A1C; Future        Common side effect of(DURVALUMAB IV) from up-to-date:  >10%:  Cardiovascular: Peripheral edema (15%)  Central nervous system: Fatigue (34% to 39%)  Dermatologic: Dermatitis (?26%), skin rash (?26%; including immune-mediated rashes), pruritus (12%)  Endocrine & metabolic: Hyperglycemia (52%), hypocalcemia (46%), hyponatremia (33%), hyperkalemia (32%), increased gamma-glutamyl transferase (24%), hypothyroidism (11% to 12%)>> mention oncology checking TSH regularly  Gastrointestinal: Constipation (21%), decreased appetite (19%), colitis (?18%), diarrhea (?18%), nausea (16%), abdominal pain (10% to 14%)  Genitourinary: Urinary tract infection (15%)  Hematologic & oncologic: Lymphocytopenia (43%; grades 3/4: 11%)  Hepatic: Increased serum ALT (39%), increased serum AST (36%), hepatitis (12%)  Infection: Infection (38% to 56%)  Neuromuscular & skeletal: Musculoskeletal pain (24%)  Respiratory: Cough (?40%), productive cough (?40%), pneumonitis (?34%), radiation pneumonitis (?34%), upper respiratory tract infection (26%), pneumonia (17%), dyspnea (?13%), dyspnea on exertion (?13%)  Miscellaneous: Fever (?15%; including tumor-associated fever)    Follow Up:      Return in about 3 months (around 3/9/2020) for follow up with vaccination/screen.    Please note that this dictation was created using voice recognition software. I have made every reasonable attempt to correct obvious errors, but I expect that there are errors of grammar and possibly content that I did not discover before finalizing the note.    Signed by:  Yasmani Gruber M.D.

## 2019-12-09 NOTE — LETTER
[facility]  Yasmani Gruber M.D.  [address]  [place] [address]  Fax: [phone]   Authorization for Release/Disclosure of   Protected Health Information   Name: MICHELLE BILLINGSLEY : 1955 SSN: [id number]   Address: [address] [address] Phone:    [phone] (home)    I authorize the entity listed below to release/disclose the PHI below to:   [facility]/Yasmani Gruber M.D. and Yasmani Gruber M.D.   Provider or Entity Name:  [facility]      Address   City, State, [facility]   Phone:      Fax: [phone]      Reason for request: continuity of care   Information to be released:    [  ] LAST COLONOSCOPY,  including any PATH REPORT and follow-up  [  ] LAST FIT/COLOGUARD RESULT [  ] LAST DEXA  [  ] LAST MAMMOGRAM  [  ] LAST PAP  [  ] LAST LABS [  ] RETINA EXAM REPORT  [  ] IMMUNIZATION RECORDS  [XXX ] Release all info      [  ] Check here and initial the line next to each item to release ALL health information INCLUDING  _____ Care and treatment for drug and / or alcohol abuse  _____ HIV testing, infection status, or AIDS  _____ Genetic Testing    DATES OF SERVICE OR TIME PERIOD TO BE DISCLOSED: _____________  I understand and acknowledge that:  * This Authorization may be revoked at any time by you in writing, except if your health information has already been used or disclosed.  * Your health information that will be used or disclosed as a result of you signing this authorization could be re-disclosed by the recipient. If this occurs, your re-disclosed health information may no longer be protected by State or Federal laws.  * You may refuse to sign this Authorization. Your refusal will not affect your ability to obtain treatment.  * This Authorization becomes effective upon signing and will  on (date) __________.      If no date is indicated, this Authorization will  one (1) year from the signature date.    Name: Michelle Billingsley    Signature: continuity of  care   Date:     12/9/2019       PLEASE FAX REQUESTED RECORDS BACK TO: [phone]

## 2019-12-09 NOTE — LETTER
[facility]  Yasmani Gruber M.D.  [address]  [place] [address]  Fax: [phone]   Authorization for Release/Disclosure of   Protected Health Information   Name: MICHELLE BILLINGSLEY : 1955 SSN: [id number]   Address: [address] [address] Phone:    [phone] (home)    I authorize the entity listed below to release/disclose the PHI below to:   [facility]/Yasmani Gruber M.D. and Yasmani Gruber M.D.   Provider or Entity Name:  [facility]      Address   City, State, [facility]   Phone:      Fax: [phone]      Reason for request: continuity of care   Information to be released:    [  ] LAST COLONOSCOPY,  including any PATH REPORT and follow-up  [  ] LAST FIT/COLOGUARD RESULT [  ] LAST DEXA  [  ] LAST MAMMOGRAM  [  ] LAST PAP  [  ] LAST LABS [  ] RETINA EXAM REPORT  [  ] IMMUNIZATION RECORDS  [XXXX ] Release all info      [  ] Check here and initial the line next to each item to release ALL health information INCLUDING  _____ Care and treatment for drug and / or alcohol abuse  _____ HIV testing, infection status, or AIDS  _____ Genetic Testing    DATES OF SERVICE OR TIME PERIOD TO BE DISCLOSED: _____________  I understand and acknowledge that:  * This Authorization may be revoked at any time by you in writing, except if your health information has already been used or disclosed.  * Your health information that will be used or disclosed as a result of you signing this authorization could be re-disclosed by the recipient. If this occurs, your re-disclosed health information may no longer be protected by State or Federal laws.  * You may refuse to sign this Authorization. Your refusal will not affect your ability to obtain treatment.  * This Authorization becomes effective upon signing and will  on (date) __________.      If no date is indicated, this Authorization will  one (1) year from the signature date.    Name: Michelle Billingsley    Signature: continuity of care   Date:     12/9/2019       PLEASE FAX REQUESTED RECORDS BACK TO: [phone]

## 2019-12-19 NOTE — TELEPHONE ENCOUNTER
Patient was seen by cancer care specialist December 12, 2019, had CBC: White BC 13.3 high, hemoglobin 12, MCV 99 high, platelet 292.    Diagnosis: lung biopsy September 6, 2019: Invasive squamous cell cancer, Stage IIIa, moderately differentiated, left lower lobe.    Treatment: 9/16/2019: Cycle 1 cisplatin/etoposide, started radiation therapy on 9/19/2019 has completed radiation therapy.  -10/21/2019: Cycle 2 cisplatin/etoposide.    -Patient hospitalized 11/4/2019 through 11/5/2019 for neutropenic fever

## 2019-12-31 NOTE — TELEPHONE ENCOUNTER
Reticulocyte count of 1.4, volume present to indicate bone marrow hyperproliferation.  With her high MCV with normal B12 and folate, likely high MCV secondary to chemotherapy.    -Advised to take vitamin D supplement over-the-counter.

## 2020-01-01 ENCOUNTER — APPOINTMENT (OUTPATIENT)
Dept: RADIOLOGY | Facility: MEDICAL CENTER | Age: 65
End: 2020-01-01
Attending: INTERNAL MEDICINE
Payer: COMMERCIAL

## 2020-01-01 ENCOUNTER — APPOINTMENT (OUTPATIENT)
Dept: RADIOLOGY | Facility: MEDICAL CENTER | Age: 65
DRG: 208 | End: 2020-01-01
Attending: INTERNAL MEDICINE
Payer: COMMERCIAL

## 2020-01-01 ENCOUNTER — TELEPHONE (OUTPATIENT)
Dept: PULMONOLOGY | Facility: HOSPICE | Age: 65
End: 2020-01-01

## 2020-01-01 ENCOUNTER — HOSPITAL ENCOUNTER (OUTPATIENT)
Dept: RADIOLOGY | Facility: MEDICAL CENTER | Age: 65
End: 2020-01-16

## 2020-01-01 ENCOUNTER — DOCUMENTATION (OUTPATIENT)
Dept: RESPIRATORY THERAPY | Facility: MEDICAL CENTER | Age: 65
End: 2020-01-01

## 2020-01-01 ENCOUNTER — APPOINTMENT (OUTPATIENT)
Dept: RADIOLOGY | Facility: MEDICAL CENTER | Age: 65
DRG: 208 | End: 2020-01-01
Payer: COMMERCIAL

## 2020-01-01 ENCOUNTER — HOSPITAL ENCOUNTER (OUTPATIENT)
Dept: LAB | Facility: MEDICAL CENTER | Age: 65
End: 2020-02-06
Attending: INTERNAL MEDICINE
Payer: COMMERCIAL

## 2020-01-01 ENCOUNTER — APPOINTMENT (OUTPATIENT)
Dept: RADIOLOGY | Facility: MEDICAL CENTER | Age: 65
DRG: 208 | End: 2020-01-01
Attending: EMERGENCY MEDICINE
Payer: COMMERCIAL

## 2020-01-01 ENCOUNTER — HOSPITAL ENCOUNTER (OUTPATIENT)
Facility: MEDICAL CENTER | Age: 65
End: 2020-01-30
Attending: RADIOLOGY | Admitting: RADIOLOGY
Payer: COMMERCIAL

## 2020-01-01 ENCOUNTER — HOSPICE ADMISSION (OUTPATIENT)
Dept: HOSPICE | Facility: HOSPICE | Age: 65
End: 2020-01-01
Payer: COMMERCIAL

## 2020-01-01 ENCOUNTER — HOSPITAL ENCOUNTER (INPATIENT)
Facility: MEDICAL CENTER | Age: 65
LOS: 12 days | DRG: 208 | End: 2020-02-27
Attending: EMERGENCY MEDICINE | Admitting: INTERNAL MEDICINE
Payer: COMMERCIAL

## 2020-01-01 ENCOUNTER — HOSPITAL ENCOUNTER (OUTPATIENT)
Dept: RADIOLOGY | Facility: MEDICAL CENTER | Age: 65
End: 2020-01-14
Attending: INTERNAL MEDICINE
Payer: COMMERCIAL

## 2020-01-01 ENCOUNTER — APPOINTMENT (OUTPATIENT)
Dept: PULMONOLOGY | Facility: HOSPICE | Age: 65
End: 2020-01-01
Payer: COMMERCIAL

## 2020-01-01 ENCOUNTER — HOSPITAL ENCOUNTER (OUTPATIENT)
Dept: LAB | Facility: MEDICAL CENTER | Age: 65
End: 2020-01-23
Attending: NURSE PRACTITIONER
Payer: COMMERCIAL

## 2020-01-01 ENCOUNTER — HOME CARE VISIT (OUTPATIENT)
Dept: HOSPICE | Facility: HOSPICE | Age: 65
End: 2020-01-01
Payer: COMMERCIAL

## 2020-01-01 ENCOUNTER — OFFICE VISIT (OUTPATIENT)
Dept: PULMONOLOGY | Facility: HOSPICE | Age: 65
End: 2020-01-01
Payer: COMMERCIAL

## 2020-01-01 VITALS
OXYGEN SATURATION: 99 % | RESPIRATION RATE: 16 BRPM | HEART RATE: 76 BPM | DIASTOLIC BLOOD PRESSURE: 59 MMHG | WEIGHT: 200 LBS | HEIGHT: 68 IN | SYSTOLIC BLOOD PRESSURE: 119 MMHG | BODY MASS INDEX: 30.31 KG/M2

## 2020-01-01 VITALS
TEMPERATURE: 97.5 F | WEIGHT: 200 LBS | SYSTOLIC BLOOD PRESSURE: 140 MMHG | HEIGHT: 68 IN | OXYGEN SATURATION: 84 % | HEART RATE: 121 BPM | BODY MASS INDEX: 30.31 KG/M2 | DIASTOLIC BLOOD PRESSURE: 80 MMHG | RESPIRATION RATE: 16 BRPM

## 2020-01-01 VITALS
SYSTOLIC BLOOD PRESSURE: 132 MMHG | HEIGHT: 68 IN | RESPIRATION RATE: 10 BRPM | OXYGEN SATURATION: 88 % | TEMPERATURE: 98.4 F | DIASTOLIC BLOOD PRESSURE: 63 MMHG | HEART RATE: 94 BPM | BODY MASS INDEX: 28.83 KG/M2 | WEIGHT: 190.26 LBS

## 2020-01-01 DIAGNOSIS — J43.9 PULMONARY EMPHYSEMA, UNSPECIFIED EMPHYSEMA TYPE (HCC): ICD-10-CM

## 2020-01-01 DIAGNOSIS — J84.10 PULMONARY FIBROSIS (HCC): ICD-10-CM

## 2020-01-01 DIAGNOSIS — C34.32 PRIMARY MALIGNANT NEOPLASM OF BRONCHUS OF LEFT LOWER LOBE (HCC): ICD-10-CM

## 2020-01-01 DIAGNOSIS — C34.92 PRIMARY MALIGNANT NEOPLASM OF LEFT LUNG METASTATIC TO OTHER SITE (HCC): ICD-10-CM

## 2020-01-01 DIAGNOSIS — J96.01 ACUTE RESPIRATORY FAILURE WITH HYPOXIA (HCC): ICD-10-CM

## 2020-01-01 DIAGNOSIS — R09.02 HYPOXIA: ICD-10-CM

## 2020-01-01 DIAGNOSIS — Z87.891 HISTORY OF TOBACCO USE: ICD-10-CM

## 2020-01-01 DIAGNOSIS — J96.11 CHRONIC RESPIRATORY FAILURE WITH HYPOXIA (HCC): ICD-10-CM

## 2020-01-01 DIAGNOSIS — C34.92 SQUAMOUS CELL CARCINOMA LUNG, LEFT (HCC): ICD-10-CM

## 2020-01-01 DIAGNOSIS — R06.02 SOB (SHORTNESS OF BREATH): ICD-10-CM

## 2020-01-01 DIAGNOSIS — C34.90 MALIGNANT NEOPLASM OF LUNG, UNSPECIFIED LATERALITY, UNSPECIFIED PART OF LUNG (HCC): ICD-10-CM

## 2020-01-01 LAB
% CD3 - BAL Q5871: 47 %
% CD3 - BAL Q5871: 74 %
% CD4 - BAL Q5872: 20 %
% CD4 - BAL Q5872: 35 %
% CD8 - BAL Q5873: 51 %
% CD8 - BAL Q5873: 8 %
1 3 BETA D GLUCAN INTERP Q4483: POSITIVE
1,3 BETA GLUCAN SER-MCNC: 139 PG/ML
ACTION RANGE TRIGGERED IACRT: NO
ACTION RANGE TRIGGERED IACRT: YES
ACTION RANGE TRIGGERED IACRT: YES
ALBUMIN SERPL BCP-MCNC: 3.1 G/DL (ref 3.2–4.9)
ALBUMIN SERPL BCP-MCNC: 3.1 G/DL (ref 3.2–4.9)
ALBUMIN SERPL BCP-MCNC: 3.2 G/DL (ref 3.2–4.9)
ALBUMIN SERPL BCP-MCNC: 3.3 G/DL (ref 3.2–4.9)
ALBUMIN SERPL BCP-MCNC: 3.3 G/DL (ref 3.2–4.9)
ALBUMIN SERPL BCP-MCNC: 3.5 G/DL (ref 3.2–4.9)
ALBUMIN SERPL BCP-MCNC: 3.6 G/DL (ref 3.2–4.9)
ALBUMIN SERPL BCP-MCNC: 3.7 G/DL (ref 3.2–4.9)
ALBUMIN SERPL BCP-MCNC: 3.8 G/DL (ref 3.2–4.9)
ALBUMIN SERPL BCP-MCNC: 3.9 G/DL (ref 3.2–4.9)
ALBUMIN SERPL BCP-MCNC: 4 G/DL (ref 3.2–4.9)
ALBUMIN SERPL BCP-MCNC: 4.1 G/DL (ref 3.2–4.9)
ALBUMIN/GLOB SERPL: 1.1 G/DL
ALBUMIN/GLOB SERPL: 1.4 G/DL
ALP SERPL-CCNC: 57 U/L (ref 30–99)
ALP SERPL-CCNC: 57 U/L (ref 30–99)
ALP SERPL-CCNC: 74 U/L (ref 30–99)
ALP SERPL-CCNC: 76 U/L (ref 30–99)
ALT SERPL-CCNC: 13 U/L (ref 2–50)
ALT SERPL-CCNC: 19 U/L (ref 2–50)
ALT SERPL-CCNC: 20 U/L (ref 2–50)
ALT SERPL-CCNC: 28 U/L (ref 2–50)
ANION GAP SERPL CALC-SCNC: 11 MMOL/L (ref 0–11.9)
ANION GAP SERPL CALC-SCNC: 12 MMOL/L (ref 0–11.9)
ANION GAP SERPL CALC-SCNC: 6 MMOL/L (ref 0–11.9)
ANION GAP SERPL CALC-SCNC: 9 MMOL/L (ref 0–11.9)
ANISOCYTOSIS BLD QL SMEAR: ABNORMAL
APPEARANCE FLD: NORMAL
APPEARANCE FLD: NORMAL
APPEARANCE UR: CLEAR
APTT PPP: 25.1 SEC (ref 24.7–36)
ASPERGILLUS AB SER QL ID: NORMAL
ASPERGILLUS AB TITR SER CF: NORMAL {TITER}
AST SERPL-CCNC: 16 U/L (ref 12–45)
AST SERPL-CCNC: 20 U/L (ref 12–45)
BACTERIA BLD CULT: NORMAL
BACTERIA BLD CULT: NORMAL
BACTERIA BRONCH AEROBE CULT: NORMAL
BACTERIA SPEC RESP CULT: NORMAL
BACTERIA UR CULT: NORMAL
BASE EXCESS BLDA CALC-SCNC: 1 MMOL/L (ref -4–3)
BASE EXCESS BLDA CALC-SCNC: 5 MMOL/L (ref -4–3)
BASE EXCESS BLDA CALC-SCNC: 7 MMOL/L (ref -4–3)
BASE EXCESS BLDA CALC-SCNC: 8 MMOL/L (ref -4–3)
BASOPHILS # BLD AUTO: 0 % (ref 0–1.8)
BASOPHILS # BLD AUTO: 0.1 % (ref 0–1.8)
BASOPHILS # BLD: 0 K/UL (ref 0–0.12)
BASOPHILS # BLD: 0.02 K/UL (ref 0–0.12)
BILIRUB SERPL-MCNC: 0.3 MG/DL (ref 0.1–1.5)
BILIRUB SERPL-MCNC: 0.4 MG/DL (ref 0.1–1.5)
BILIRUB SERPL-MCNC: 0.4 MG/DL (ref 0.1–1.5)
BILIRUB SERPL-MCNC: 0.5 MG/DL (ref 0.1–1.5)
BILIRUB UR QL STRIP.AUTO: NEGATIVE
BODY FLD TYPE: NORMAL
BODY FLD TYPE: NORMAL
BODY TEMPERATURE: ABNORMAL DEGREES
BODY TEMPERATURE: NORMAL CENTIGRADE
BUN SERPL-MCNC: 12 MG/DL (ref 8–22)
BUN SERPL-MCNC: 15 MG/DL (ref 8–22)
BUN SERPL-MCNC: 16 MG/DL (ref 8–22)
BUN SERPL-MCNC: 17 MG/DL (ref 8–22)
BUN SERPL-MCNC: 19 MG/DL (ref 8–22)
BUN SERPL-MCNC: 21 MG/DL (ref 8–22)
BUN SERPL-MCNC: 22 MG/DL (ref 8–22)
BUN SERPL-MCNC: 23 MG/DL (ref 8–22)
BUN SERPL-MCNC: 23 MG/DL (ref 8–22)
BUN SERPL-MCNC: 25 MG/DL (ref 8–22)
BUN SERPL-MCNC: 27 MG/DL (ref 8–22)
BUN SERPL-MCNC: 29 MG/DL (ref 8–22)
C IMMITIS IGM SPEC QL IA: 0.3 IV
C PNEUM DNA SPEC QL NAA+PROBE: NOT DETECTED
CALCIUM SERPL-MCNC: 8.5 MG/DL (ref 8.5–10.5)
CALCIUM SERPL-MCNC: 8.5 MG/DL (ref 8.5–10.5)
CALCIUM SERPL-MCNC: 8.7 MG/DL (ref 8.5–10.5)
CALCIUM SERPL-MCNC: 8.8 MG/DL (ref 8.5–10.5)
CALCIUM SERPL-MCNC: 8.9 MG/DL (ref 8.5–10.5)
CALCIUM SERPL-MCNC: 9.1 MG/DL (ref 8.5–10.5)
CALCIUM SERPL-MCNC: 9.1 MG/DL (ref 8.5–10.5)
CALCIUM SERPL-MCNC: 9.2 MG/DL (ref 8.5–10.5)
CALCIUM SERPL-MCNC: 9.2 MG/DL (ref 8.5–10.5)
CALCIUM SERPL-MCNC: 9.4 MG/DL (ref 8.5–10.5)
CALCIUM SERPL-MCNC: 9.5 MG/DL (ref 8.5–10.5)
CD4:CD8 RATIO - BAL Q5874: 0.39 RATIO
CD4:CD8 RATIO - BAL Q5874: 4.38 RATIO
CHLORIDE SERPL-SCNC: 100 MMOL/L (ref 96–112)
CHLORIDE SERPL-SCNC: 100 MMOL/L (ref 96–112)
CHLORIDE SERPL-SCNC: 101 MMOL/L (ref 96–112)
CHLORIDE SERPL-SCNC: 102 MMOL/L (ref 96–112)
CHLORIDE SERPL-SCNC: 103 MMOL/L (ref 96–112)
CHLORIDE SERPL-SCNC: 104 MMOL/L (ref 96–112)
CHLORIDE SERPL-SCNC: 105 MMOL/L (ref 96–112)
CHLORIDE SERPL-SCNC: 83 MMOL/L (ref 96–112)
CHLORIDE SERPL-SCNC: 86 MMOL/L (ref 96–112)
CHLORIDE SERPL-SCNC: 91 MMOL/L (ref 96–112)
CHLORIDE SERPL-SCNC: 94 MMOL/L (ref 96–112)
CHLORIDE SERPL-SCNC: 95 MMOL/L (ref 96–112)
CHLORIDE SERPL-SCNC: 98 MMOL/L (ref 96–112)
CHLORIDE SERPL-SCNC: 98 MMOL/L (ref 96–112)
CHLORIDE SERPL-SCNC: 99 MMOL/L (ref 96–112)
CO2 BLDA-SCNC: 32 MMOL/L (ref 20–33)
CO2 BLDA-SCNC: 32 MMOL/L (ref 20–33)
CO2 BLDA-SCNC: 39 MMOL/L (ref 20–33)
CO2 SERPL-SCNC: 24 MMOL/L (ref 20–33)
CO2 SERPL-SCNC: 26 MMOL/L (ref 20–33)
CO2 SERPL-SCNC: 27 MMOL/L (ref 20–33)
CO2 SERPL-SCNC: 27 MMOL/L (ref 20–33)
CO2 SERPL-SCNC: 28 MMOL/L (ref 20–33)
CO2 SERPL-SCNC: 28 MMOL/L (ref 20–33)
CO2 SERPL-SCNC: 29 MMOL/L (ref 20–33)
CO2 SERPL-SCNC: 29 MMOL/L (ref 20–33)
CO2 SERPL-SCNC: 30 MMOL/L (ref 20–33)
CO2 SERPL-SCNC: 30 MMOL/L (ref 20–33)
CO2 SERPL-SCNC: 31 MMOL/L (ref 20–33)
CO2 SERPL-SCNC: 32 MMOL/L (ref 20–33)
COCCIDIOIDES AB SPEC QL ID: NORMAL
COCCIDIOIDES AB TITR SER CF: NORMAL {TITER}
COCCIDIOIDES IGG SPEC QL IA: 0.7 IV
COLOR FLD: NORMAL
COLOR FLD: NORMAL
COLOR UR: YELLOW
CREAT SERPL-MCNC: 0.43 MG/DL (ref 0.5–1.4)
CREAT SERPL-MCNC: 0.48 MG/DL (ref 0.5–1.4)
CREAT SERPL-MCNC: 0.55 MG/DL (ref 0.5–1.4)
CREAT SERPL-MCNC: 0.56 MG/DL (ref 0.5–1.4)
CREAT SERPL-MCNC: 0.57 MG/DL (ref 0.5–1.4)
CREAT SERPL-MCNC: 0.59 MG/DL (ref 0.5–1.4)
CREAT SERPL-MCNC: 0.59 MG/DL (ref 0.5–1.4)
CREAT SERPL-MCNC: 0.6 MG/DL (ref 0.5–1.4)
CREAT SERPL-MCNC: 0.61 MG/DL (ref 0.5–1.4)
CREAT SERPL-MCNC: 0.62 MG/DL (ref 0.5–1.4)
CREAT SERPL-MCNC: 0.66 MG/DL (ref 0.5–1.4)
CREAT SERPL-MCNC: 0.67 MG/DL (ref 0.5–1.4)
CREAT SERPL-MCNC: 0.68 MG/DL (ref 0.5–1.4)
CREAT SERPL-MCNC: 0.69 MG/DL (ref 0.5–1.4)
CREAT SERPL-MCNC: 0.7 MG/DL (ref 0.5–1.4)
CRP SERPL HS-MCNC: 2.27 MG/DL (ref 0–0.75)
CRP SERPL HS-MCNC: 4.47 MG/DL (ref 0–0.75)
CRP SERPL HS-MCNC: 4.74 MG/DL (ref 0–0.75)
CRP SERPL HS-MCNC: 6.38 MG/DL (ref 0–0.75)
CSF COMMENTS 1658: NORMAL
CYTOLOGY REG CYTOL: NORMAL
DACRYOCYTES BLD QL SMEAR: NORMAL
EKG IMPRESSION: NORMAL
EKG IMPRESSION: NORMAL
EOSINOPHIL # BLD AUTO: 0 K/UL (ref 0–0.51)
EOSINOPHIL # BLD AUTO: 0.13 K/UL (ref 0–0.51)
EOSINOPHIL NFR BLD: 0 % (ref 0–6.9)
EOSINOPHIL NFR BLD: 0.8 % (ref 0–6.9)
EOSINOPHIL NFR FLD: 1 %
EOSINOPHIL NFR FLD: 1 %
ERYTHROCYTE [DISTWIDTH] IN BLOOD BY AUTOMATED COUNT: 51 FL (ref 35.9–50)
ERYTHROCYTE [DISTWIDTH] IN BLOOD BY AUTOMATED COUNT: 51.9 FL (ref 35.9–50)
ERYTHROCYTE [DISTWIDTH] IN BLOOD BY AUTOMATED COUNT: 52 FL (ref 35.9–50)
ERYTHROCYTE [DISTWIDTH] IN BLOOD BY AUTOMATED COUNT: 52 FL (ref 35.9–50)
ERYTHROCYTE [DISTWIDTH] IN BLOOD BY AUTOMATED COUNT: 53 FL (ref 35.9–50)
ERYTHROCYTE [DISTWIDTH] IN BLOOD BY AUTOMATED COUNT: 54 FL (ref 35.9–50)
ERYTHROCYTE [DISTWIDTH] IN BLOOD BY AUTOMATED COUNT: 54.1 FL (ref 35.9–50)
ERYTHROCYTE [DISTWIDTH] IN BLOOD BY AUTOMATED COUNT: 54.6 FL (ref 35.9–50)
ERYTHROCYTE [DISTWIDTH] IN BLOOD BY AUTOMATED COUNT: 54.7 FL (ref 35.9–50)
ERYTHROCYTE [DISTWIDTH] IN BLOOD BY AUTOMATED COUNT: 55 FL (ref 35.9–50)
ERYTHROCYTE [DISTWIDTH] IN BLOOD BY AUTOMATED COUNT: 55.2 FL (ref 35.9–50)
ERYTHROCYTE [DISTWIDTH] IN BLOOD BY AUTOMATED COUNT: 56.3 FL (ref 35.9–50)
FLUAV H1 2009 PAND RNA SPEC QL NAA+PROBE: NOT DETECTED
FLUAV H1 RNA SPEC QL NAA+PROBE: NOT DETECTED
FLUAV H3 RNA SPEC QL NAA+PROBE: NOT DETECTED
FLUAV RNA SPEC QL NAA+PROBE: NEGATIVE
FLUAV RNA SPEC QL NAA+PROBE: NOT DETECTED
FLUBV RNA SPEC QL NAA+PROBE: NEGATIVE
FLUBV RNA SPEC QL NAA+PROBE: NOT DETECTED
FUNGUS SPEC FUNGUS STN: NORMAL
GALACTOMANNAN AG SERPL QL IA: NEGATIVE
GALACTOMANNAN AG SERPL-ACNC: 0.05
GLOBULIN SER CALC-MCNC: 2.9 G/DL (ref 1.9–3.5)
GLOBULIN SER CALC-MCNC: 3.1 G/DL (ref 1.9–3.5)
GLOBULIN SER CALC-MCNC: 3.2 G/DL (ref 1.9–3.5)
GLOBULIN SER CALC-MCNC: 3.4 G/DL (ref 1.9–3.5)
GLUCOSE BLD-MCNC: 168 MG/DL (ref 65–99)
GLUCOSE SERPL-MCNC: 119 MG/DL (ref 65–99)
GLUCOSE SERPL-MCNC: 120 MG/DL (ref 65–99)
GLUCOSE SERPL-MCNC: 129 MG/DL (ref 65–99)
GLUCOSE SERPL-MCNC: 134 MG/DL (ref 65–99)
GLUCOSE SERPL-MCNC: 143 MG/DL (ref 65–99)
GLUCOSE SERPL-MCNC: 151 MG/DL (ref 65–99)
GLUCOSE SERPL-MCNC: 156 MG/DL (ref 65–99)
GLUCOSE SERPL-MCNC: 202 MG/DL (ref 65–99)
GLUCOSE SERPL-MCNC: 225 MG/DL (ref 65–99)
GLUCOSE SERPL-MCNC: 84 MG/DL (ref 65–99)
GLUCOSE SERPL-MCNC: 86 MG/DL (ref 65–99)
GLUCOSE SERPL-MCNC: 86 MG/DL (ref 65–99)
GLUCOSE SERPL-MCNC: 96 MG/DL (ref 65–99)
GLUCOSE UR STRIP.AUTO-MCNC: NEGATIVE MG/DL
GRAM STN SPEC: NORMAL
H CAPSUL MYC AB TITR SER CF: NORMAL {TITER}
HADV DNA SPEC QL NAA+PROBE: NOT DETECTED
HCO3 BLDA-SCNC: 23 MMOL/L (ref 17–25)
HCO3 BLDA-SCNC: 30.4 MMOL/L (ref 17–25)
HCO3 BLDA-SCNC: 30.8 MMOL/L (ref 17–25)
HCO3 BLDA-SCNC: 36.3 MMOL/L (ref 17–25)
HCOV RNA SPEC QL NAA+PROBE: NOT DETECTED
HCT VFR BLD AUTO: 29.2 % (ref 37–47)
HCT VFR BLD AUTO: 29.3 % (ref 37–47)
HCT VFR BLD AUTO: 29.9 % (ref 37–47)
HCT VFR BLD AUTO: 30.6 % (ref 37–47)
HCT VFR BLD AUTO: 31.5 % (ref 37–47)
HCT VFR BLD AUTO: 32.6 % (ref 37–47)
HCT VFR BLD AUTO: 34.6 % (ref 37–47)
HCT VFR BLD AUTO: 34.6 % (ref 37–47)
HCT VFR BLD AUTO: 35.3 % (ref 37–47)
HCT VFR BLD AUTO: 36.6 % (ref 37–47)
HCT VFR BLD AUTO: 39.7 % (ref 37–47)
HCT VFR BLD AUTO: 40.3 % (ref 37–47)
HGB BLD-MCNC: 10.1 G/DL (ref 12–16)
HGB BLD-MCNC: 10.2 G/DL (ref 12–16)
HGB BLD-MCNC: 10.8 G/DL (ref 12–16)
HGB BLD-MCNC: 11.1 G/DL (ref 12–16)
HGB BLD-MCNC: 11.4 G/DL (ref 12–16)
HGB BLD-MCNC: 11.9 G/DL (ref 12–16)
HGB BLD-MCNC: 12.2 G/DL (ref 12–16)
HGB BLD-MCNC: 13 G/DL (ref 12–16)
HGB BLD-MCNC: 13.3 G/DL (ref 12–16)
HGB BLD-MCNC: 9.5 G/DL (ref 12–16)
HGB BLD-MCNC: 9.5 G/DL (ref 12–16)
HGB BLD-MCNC: 9.7 G/DL (ref 12–16)
HISTIOCYTES NFR FLD: 1 %
HISTIOCYTES NFR FLD: 5 %
HISTO AB YEAST 9338: NORMAL
HIV 1+2 AB+HIV1 P24 AG SERPL QL IA: NON REACTIVE
HMPV RNA SPEC QL NAA+PROBE: NOT DETECTED
HOROWITZ INDEX BLDA+IHG-RTO: 111 MM[HG]
HOROWITZ INDEX BLDA+IHG-RTO: 95 MM[HG]
HOROWITZ INDEX BLDA+IHG-RTO: 95 MM[HG]
HPIV1 RNA SPEC QL NAA+PROBE: NOT DETECTED
HPIV2 RNA SPEC QL NAA+PROBE: NOT DETECTED
HPIV3 RNA SPEC QL NAA+PROBE: NOT DETECTED
HPIV4 RNA SPEC QL NAA+PROBE: NOT DETECTED
HYPOCHROMIA BLD QL SMEAR: ABNORMAL
IMM GRANULOCYTES # BLD AUTO: 0.09 K/UL (ref 0–0.11)
IMM GRANULOCYTES NFR BLD AUTO: 0.5 % (ref 0–0.9)
INR PPP: 1.03 (ref 0.87–1.13)
INST. QUALIFIED PATIENT IIQPT: YES
KETONES UR STRIP.AUTO-MCNC: NEGATIVE MG/DL
LACTATE BLD-SCNC: 1.2 MMOL/L (ref 0.5–2)
LACTATE BLD-SCNC: 1.5 MMOL/L (ref 0.5–2)
LACTATE BLD-SCNC: 4.3 MMOL/L (ref 0.5–2)
LDH SERPL L TO P-CCNC: 689 U/L (ref 107–266)
LEUKOCYTE ESTERASE UR QL STRIP.AUTO: NEGATIVE
LYMPHOCYTES # BLD AUTO: 0.22 K/UL (ref 1–4.8)
LYMPHOCYTES # BLD AUTO: 0.62 K/UL (ref 1–4.8)
LYMPHOCYTES NFR BLD: 1.8 % (ref 22–41)
LYMPHOCYTES NFR BLD: 3.6 % (ref 22–41)
LYMPHOCYTES NFR FLD: 4 %
LYMPHOCYTES NFR FLD: 4 %
M PNEUMO DNA SPEC QL NAA+PROBE: NOT DETECTED
MAGNESIUM SERPL-MCNC: 1.7 MG/DL (ref 1.5–2.5)
MAGNESIUM SERPL-MCNC: 1.9 MG/DL (ref 1.5–2.5)
MAGNESIUM SERPL-MCNC: 2 MG/DL (ref 1.5–2.5)
MAGNESIUM SERPL-MCNC: 2.1 MG/DL (ref 1.5–2.5)
MAGNESIUM SERPL-MCNC: 2.1 MG/DL (ref 1.5–2.5)
MAGNESIUM SERPL-MCNC: 2.2 MG/DL (ref 1.5–2.5)
MAGNESIUM SERPL-MCNC: 2.3 MG/DL (ref 1.5–2.5)
MANUAL DIFF BLD: NORMAL
MCH RBC QN AUTO: 29.9 PG (ref 27–33)
MCH RBC QN AUTO: 30.2 PG (ref 27–33)
MCH RBC QN AUTO: 30.4 PG (ref 27–33)
MCH RBC QN AUTO: 30.7 PG (ref 27–33)
MCH RBC QN AUTO: 30.8 PG (ref 27–33)
MCH RBC QN AUTO: 30.9 PG (ref 27–33)
MCH RBC QN AUTO: 30.9 PG (ref 27–33)
MCH RBC QN AUTO: 31 PG (ref 27–33)
MCH RBC QN AUTO: 31 PG (ref 27–33)
MCH RBC QN AUTO: 31.1 PG (ref 27–33)
MCH RBC QN AUTO: 31.2 PG (ref 27–33)
MCH RBC QN AUTO: 31.4 PG (ref 27–33)
MCHC RBC AUTO-ENTMCNC: 32.1 G/DL (ref 33.6–35)
MCHC RBC AUTO-ENTMCNC: 32.4 G/DL (ref 33.6–35)
MCHC RBC AUTO-ENTMCNC: 32.5 G/DL (ref 33.6–35)
MCHC RBC AUTO-ENTMCNC: 32.7 G/DL (ref 33.6–35)
MCHC RBC AUTO-ENTMCNC: 32.9 G/DL (ref 33.6–35)
MCHC RBC AUTO-ENTMCNC: 33 G/DL (ref 33.6–35)
MCHC RBC AUTO-ENTMCNC: 33 G/DL (ref 33.6–35)
MCHC RBC AUTO-ENTMCNC: 33.1 G/DL (ref 33.6–35)
MCHC RBC AUTO-ENTMCNC: 33.3 G/DL (ref 33.6–35)
MCHC RBC AUTO-ENTMCNC: 33.7 G/DL (ref 33.6–35)
MCV RBC AUTO: 91.3 FL (ref 81.4–97.8)
MCV RBC AUTO: 91.3 FL (ref 81.4–97.8)
MCV RBC AUTO: 91.6 FL (ref 81.4–97.8)
MCV RBC AUTO: 91.7 FL (ref 81.4–97.8)
MCV RBC AUTO: 93.1 FL (ref 81.4–97.8)
MCV RBC AUTO: 94.2 FL (ref 81.4–97.8)
MCV RBC AUTO: 94.6 FL (ref 81.4–97.8)
MCV RBC AUTO: 95.3 FL (ref 81.4–97.8)
MCV RBC AUTO: 95.4 FL (ref 81.4–97.8)
MCV RBC AUTO: 95.8 FL (ref 81.4–97.8)
MCV RBC AUTO: 96.1 FL (ref 81.4–97.8)
MCV RBC AUTO: 96.3 FL (ref 81.4–97.8)
MICRO URNS: NORMAL
MICROCYTES BLD QL SMEAR: ABNORMAL
MONOCYTES # BLD AUTO: 0 K/UL (ref 0–0.85)
MONOCYTES # BLD AUTO: 0.48 K/UL (ref 0–0.85)
MONOCYTES NFR BLD AUTO: 0 % (ref 0–13.4)
MONOCYTES NFR BLD AUTO: 2.8 % (ref 0–13.4)
MONONUC CELLS NFR FLD: 2 %
MONONUC CELLS NFR FLD: 4 %
MORPHOLOGY BLD-IMP: NORMAL
MRSA DNA SPEC QL NAA+PROBE: NORMAL
NEUTROPHILS # BLD AUTO: 12.18 K/UL (ref 2–7.15)
NEUTROPHILS # BLD AUTO: 15.77 K/UL (ref 2–7.15)
NEUTROPHILS NFR BLD: 92.2 % (ref 44–72)
NEUTROPHILS NFR BLD: 98.2 % (ref 44–72)
NEUTROPHILS NFR FLD: 88 %
NEUTROPHILS NFR FLD: 90 %
NITRITE UR QL STRIP.AUTO: NEGATIVE
NRBC # BLD AUTO: 0 K/UL
NRBC # BLD AUTO: 0 K/UL
NRBC BLD-RTO: 0 /100 WBC
NRBC BLD-RTO: 0 /100 WBC
NT-PROBNP SERPL IA-MCNC: 24 PG/ML (ref 0–125)
O2/TOTAL GAS SETTING VFR VENT: 100 %
O2/TOTAL GAS SETTING VFR VENT: 75 %
O2/TOTAL GAS SETTING VFR VENT: 75 %
P JIROVECII AG SPEC QL IF: NEGATIVE
P JIROVECII AG SPEC QL IF: NEGATIVE
P JIROVECII DNA SPEC QL NAA+PROBE: DETECTED
P JIROVECII DNA SPEC QL NAA+PROBE: DETECTED
PCO2 BLDA: 30.8 MMHG (ref 26–37)
PCO2 BLDA: 36.8 MMHG (ref 26–37)
PCO2 BLDA: 50.2 MMHG (ref 26–37)
PCO2 BLDA: 80 MMHG (ref 26–37)
PCO2 TEMP ADJ BLDA: 36.1 MMHG (ref 26–37)
PCO2 TEMP ADJ BLDA: 49.6 MMHG (ref 26–37)
PCO2 TEMP ADJ BLDA: 80.4 MMHG (ref 26–37)
PH BLDA: 7.26 [PH] (ref 7.4–7.5)
PH BLDA: 7.39 [PH] (ref 7.4–7.5)
PH BLDA: 7.5 [PH] (ref 7.4–7.5)
PH BLDA: 7.53 [PH] (ref 7.4–7.5)
PH TEMP ADJ BLDA: 7.26 [PH] (ref 7.4–7.5)
PH TEMP ADJ BLDA: 7.39 [PH] (ref 7.4–7.5)
PH TEMP ADJ BLDA: 7.54 [PH] (ref 7.4–7.5)
PH UR STRIP.AUTO: 6 [PH] (ref 5–8)
PHOSPHATE SERPL-MCNC: 2.8 MG/DL (ref 2.5–4.5)
PHOSPHATE SERPL-MCNC: 2.9 MG/DL (ref 2.5–4.5)
PHOSPHATE SERPL-MCNC: 3.1 MG/DL (ref 2.5–4.5)
PHOSPHATE SERPL-MCNC: 3.3 MG/DL (ref 2.5–4.5)
PHOSPHATE SERPL-MCNC: 3.5 MG/DL (ref 2.5–4.5)
PHOSPHATE SERPL-MCNC: 3.6 MG/DL (ref 2.5–4.5)
PHOSPHATE SERPL-MCNC: 3.6 MG/DL (ref 2.5–4.5)
PHOSPHATE SERPL-MCNC: 3.9 MG/DL (ref 2.5–4.5)
PHOSPHATE SERPL-MCNC: 4 MG/DL (ref 2.5–4.5)
PHOSPHATE SERPL-MCNC: 4.5 MG/DL (ref 2.5–4.5)
PLATELET # BLD AUTO: 189 K/UL (ref 164–446)
PLATELET # BLD AUTO: 190 K/UL (ref 164–446)
PLATELET # BLD AUTO: 192 K/UL (ref 164–446)
PLATELET # BLD AUTO: 192 K/UL (ref 164–446)
PLATELET # BLD AUTO: 195 K/UL (ref 164–446)
PLATELET # BLD AUTO: 198 K/UL (ref 164–446)
PLATELET # BLD AUTO: 198 K/UL (ref 164–446)
PLATELET # BLD AUTO: 206 K/UL (ref 164–446)
PLATELET # BLD AUTO: 219 K/UL (ref 164–446)
PLATELET # BLD AUTO: 227 K/UL (ref 164–446)
PLATELET # BLD AUTO: 237 K/UL (ref 164–446)
PLATELET # BLD AUTO: 255 K/UL (ref 164–446)
PLATELET BLD QL SMEAR: NORMAL
PMV BLD AUTO: 8.8 FL (ref 9–12.9)
PMV BLD AUTO: 8.9 FL (ref 9–12.9)
PMV BLD AUTO: 9.2 FL (ref 9–12.9)
PMV BLD AUTO: 9.3 FL (ref 9–12.9)
PMV BLD AUTO: 9.4 FL (ref 9–12.9)
PMV BLD AUTO: 9.5 FL (ref 9–12.9)
PMV BLD AUTO: 9.6 FL (ref 9–12.9)
PMV BLD AUTO: 9.7 FL (ref 9–12.9)
PMV BLD AUTO: 9.7 FL (ref 9–12.9)
PMV BLD AUTO: 9.8 FL (ref 9–12.9)
PO2 BLDA: 111 MMHG (ref 64–87)
PO2 BLDA: 71 MMHG (ref 64–87)
PO2 BLDA: 71 MMHG (ref 64–87)
PO2 BLDA: 76.4 MMHG (ref 64–87)
PO2 TEMP ADJ BLDA: 112 MMHG (ref 64–87)
PO2 TEMP ADJ BLDA: 69 MMHG (ref 64–87)
PO2 TEMP ADJ BLDA: 69 MMHG (ref 64–87)
POIKILOCYTOSIS BLD QL SMEAR: NORMAL
POTASSIUM SERPL-SCNC: 3.5 MMOL/L (ref 3.6–5.5)
POTASSIUM SERPL-SCNC: 3.6 MMOL/L (ref 3.6–5.5)
POTASSIUM SERPL-SCNC: 3.6 MMOL/L (ref 3.6–5.5)
POTASSIUM SERPL-SCNC: 3.7 MMOL/L (ref 3.6–5.5)
POTASSIUM SERPL-SCNC: 3.8 MMOL/L (ref 3.6–5.5)
POTASSIUM SERPL-SCNC: 3.9 MMOL/L (ref 3.6–5.5)
POTASSIUM SERPL-SCNC: 4 MMOL/L (ref 3.6–5.5)
POTASSIUM SERPL-SCNC: 4 MMOL/L (ref 3.6–5.5)
POTASSIUM SERPL-SCNC: 4.2 MMOL/L (ref 3.6–5.5)
POTASSIUM SERPL-SCNC: 4.3 MMOL/L (ref 3.6–5.5)
POTASSIUM SERPL-SCNC: 4.4 MMOL/L (ref 3.6–5.5)
POTASSIUM SERPL-SCNC: 4.5 MMOL/L (ref 3.6–5.5)
POTASSIUM SERPL-SCNC: 4.5 MMOL/L (ref 3.6–5.5)
POTASSIUM SERPL-SCNC: 4.6 MMOL/L (ref 3.6–5.5)
POTASSIUM SERPL-SCNC: 4.6 MMOL/L (ref 3.6–5.5)
PREALB SERPL-MCNC: 15 MG/DL (ref 18–38)
PREALB SERPL-MCNC: 17 MG/DL (ref 18–38)
PREALB SERPL-MCNC: 24 MG/DL (ref 18–38)
PREALB SERPL-MCNC: 29 MG/DL (ref 18–38)
PROCALCITONIN SERPL-MCNC: 0.08 NG/ML
PROCALCITONIN SERPL-MCNC: 0.11 NG/ML
PROCALCITONIN SERPL-MCNC: 0.12 NG/ML
PROT SERPL-MCNC: 6.6 G/DL (ref 6–8.2)
PROT SERPL-MCNC: 6.8 G/DL (ref 6–8.2)
PROT SERPL-MCNC: 7 G/DL (ref 6–8.2)
PROT SERPL-MCNC: 7.2 G/DL (ref 6–8.2)
PROT UR QL STRIP: NEGATIVE MG/DL
PROTHROMBIN TIME: 13.7 SEC (ref 12–14.6)
RBC # BLD AUTO: 3.06 M/UL (ref 4.2–5.4)
RBC # BLD AUTO: 3.06 M/UL (ref 4.2–5.4)
RBC # BLD AUTO: 3.16 M/UL (ref 4.2–5.4)
RBC # BLD AUTO: 3.25 M/UL (ref 4.2–5.4)
RBC # BLD AUTO: 3.27 M/UL (ref 4.2–5.4)
RBC # BLD AUTO: 3.5 M/UL (ref 4.2–5.4)
RBC # BLD AUTO: 3.6 M/UL (ref 4.2–5.4)
RBC # BLD AUTO: 3.63 M/UL (ref 4.2–5.4)
RBC # BLD AUTO: 3.85 M/UL (ref 4.2–5.4)
RBC # BLD AUTO: 4.01 M/UL (ref 4.2–5.4)
RBC # BLD AUTO: 4.35 M/UL (ref 4.2–5.4)
RBC # BLD AUTO: 4.4 M/UL (ref 4.2–5.4)
RBC BLD AUTO: PRESENT
RBC UR QL AUTO: NEGATIVE
RHODAMINE-AURAMINE STN SPEC: NORMAL
RSV A RNA SPEC QL NAA+PROBE: NOT DETECTED
RSV B RNA SPEC QL NAA+PROBE: NOT DETECTED
RV+EV RNA SPEC QL NAA+PROBE: NOT DETECTED
SAO2 % BLDA: 93 % (ref 93–99)
SAO2 % BLDA: 94.9 % (ref 93–99)
SAO2 % BLDA: 96 % (ref 93–99)
SAO2 % BLDA: 97 % (ref 93–99)
SIGNIFICANT IND 70042: NORMAL
SITE SITE: NORMAL
SODIUM SERPL-SCNC: 123 MMOL/L (ref 135–145)
SODIUM SERPL-SCNC: 129 MMOL/L (ref 135–145)
SODIUM SERPL-SCNC: 132 MMOL/L (ref 135–145)
SODIUM SERPL-SCNC: 134 MMOL/L (ref 135–145)
SODIUM SERPL-SCNC: 135 MMOL/L (ref 135–145)
SODIUM SERPL-SCNC: 137 MMOL/L (ref 135–145)
SODIUM SERPL-SCNC: 138 MMOL/L (ref 135–145)
SODIUM SERPL-SCNC: 138 MMOL/L (ref 135–145)
SODIUM SERPL-SCNC: 139 MMOL/L (ref 135–145)
SODIUM SERPL-SCNC: 140 MMOL/L (ref 135–145)
SODIUM SERPL-SCNC: 140 MMOL/L (ref 135–145)
SODIUM SERPL-SCNC: 141 MMOL/L (ref 135–145)
SOURCE SOURCE: NORMAL
SP GR UR STRIP.AUTO: 1.02
SPECIMEN DRAWN FROM PATIENT: ABNORMAL
SPECIMEN SOURCE: ABNORMAL
SPECIMEN SOURCE: ABNORMAL
SPECIMEN SOURCE: NORMAL
T4 FREE SERPL-MCNC: 0.68 NG/DL (ref 0.53–1.43)
T4 FREE SERPL-MCNC: 0.94 NG/DL (ref 0.53–1.43)
TRIGL SERPL-MCNC: 106 MG/DL (ref 0–149)
TSH SERPL DL<=0.005 MIU/L-ACNC: 0.8 UIU/ML (ref 0.38–5.33)
TSH SERPL DL<=0.005 MIU/L-ACNC: 2.69 UIU/ML (ref 0.38–5.33)
UROBILINOGEN UR STRIP.AUTO-MCNC: 0.2 MG/DL
WBC # BLD AUTO: 12.4 K/UL (ref 4.8–10.8)
WBC # BLD AUTO: 16 K/UL (ref 4.8–10.8)
WBC # BLD AUTO: 16.7 K/UL (ref 4.8–10.8)
WBC # BLD AUTO: 17.1 K/UL (ref 4.8–10.8)
WBC # BLD AUTO: 18.1 K/UL (ref 4.8–10.8)
WBC # BLD AUTO: 19.7 K/UL (ref 4.8–10.8)
WBC # BLD AUTO: 20.4 K/UL (ref 4.8–10.8)
WBC # BLD AUTO: 20.9 K/UL (ref 4.8–10.8)
WBC # BLD AUTO: 22.1 K/UL (ref 4.8–10.8)
WBC # BLD AUTO: 23.6 K/UL (ref 4.8–10.8)
WBC # BLD AUTO: 26.2 K/UL (ref 4.8–10.8)
WBC # BLD AUTO: 30.1 K/UL (ref 4.8–10.8)
WBC # FLD: NORMAL CELLS/UL
WBC # FLD: NORMAL CELLS/UL

## 2020-01-01 PROCEDURE — 700111 HCHG RX REV CODE 636 W/ 250 OVERRIDE (IP): Performed by: INTERNAL MEDICINE

## 2020-01-01 PROCEDURE — 99291 CRITICAL CARE FIRST HOUR: CPT | Mod: 25 | Performed by: INTERNAL MEDICINE

## 2020-01-01 PROCEDURE — A9270 NON-COVERED ITEM OR SERVICE: HCPCS | Performed by: INTERNAL MEDICINE

## 2020-01-01 PROCEDURE — 94640 AIRWAY INHALATION TREATMENT: CPT

## 2020-01-01 PROCEDURE — 83735 ASSAY OF MAGNESIUM: CPT

## 2020-01-01 PROCEDURE — 85027 COMPLETE CBC AUTOMATED: CPT

## 2020-01-01 PROCEDURE — 85730 THROMBOPLASTIN TIME PARTIAL: CPT

## 2020-01-01 PROCEDURE — 71275 CT ANGIOGRAPHY CHEST: CPT

## 2020-01-01 PROCEDURE — 84439 ASSAY OF FREE THYROXINE: CPT

## 2020-01-01 PROCEDURE — 700102 HCHG RX REV CODE 250 W/ 637 OVERRIDE(OP): Performed by: INTERNAL MEDICINE

## 2020-01-01 PROCEDURE — 700105 HCHG RX REV CODE 258: Performed by: INTERNAL MEDICINE

## 2020-01-01 PROCEDURE — 99292 CRITICAL CARE ADDL 30 MIN: CPT | Performed by: INTERNAL MEDICINE

## 2020-01-01 PROCEDURE — 700117 HCHG RX CONTRAST REV CODE 255: Performed by: INTERNAL MEDICINE

## 2020-01-01 PROCEDURE — 88312 SPECIAL STAINS GROUP 1: CPT

## 2020-01-01 PROCEDURE — 87015 SPECIMEN INFECT AGNT CONCNTJ: CPT | Mod: 91

## 2020-01-01 PROCEDURE — 306581 SET INFUSION,POWERLOC 20G X 1: Performed by: INTERNAL MEDICINE

## 2020-01-01 PROCEDURE — 82803 BLOOD GASES ANY COMBINATION: CPT

## 2020-01-01 PROCEDURE — 700105 HCHG RX REV CODE 258

## 2020-01-01 PROCEDURE — 700105 HCHG RX REV CODE 258: Performed by: NURSE PRACTITIONER

## 2020-01-01 PROCEDURE — 81003 URINALYSIS AUTO W/O SCOPE: CPT

## 2020-01-01 PROCEDURE — 87305 ASPERGILLUS AG IA: CPT

## 2020-01-01 PROCEDURE — 94003 VENT MGMT INPAT SUBQ DAY: CPT

## 2020-01-01 PROCEDURE — 71045 X-RAY EXAM CHEST 1 VIEW: CPT

## 2020-01-01 PROCEDURE — 80053 COMPREHEN METABOLIC PANEL: CPT

## 2020-01-01 PROCEDURE — 99291 CRITICAL CARE FIRST HOUR: CPT | Performed by: INTERNAL MEDICINE

## 2020-01-01 PROCEDURE — 700101 HCHG RX REV CODE 250: Performed by: INTERNAL MEDICINE

## 2020-01-01 PROCEDURE — 99233 SBSQ HOSP IP/OBS HIGH 50: CPT | Performed by: INTERNAL MEDICINE

## 2020-01-01 PROCEDURE — 84134 ASSAY OF PREALBUMIN: CPT

## 2020-01-01 PROCEDURE — 80069 RENAL FUNCTION PANEL: CPT

## 2020-01-01 PROCEDURE — 87205 SMEAR GRAM STAIN: CPT | Mod: 91

## 2020-01-01 PROCEDURE — 0B968ZZ DRAINAGE OF RIGHT LOWER LOBE BRONCHUS, VIA NATURAL OR ARTIFICIAL OPENING ENDOSCOPIC: ICD-10-PCS | Performed by: INTERNAL MEDICINE

## 2020-01-01 PROCEDURE — 89051 BODY FLUID CELL COUNT: CPT | Mod: 91

## 2020-01-01 PROCEDURE — 31500 INSERT EMERGENCY AIRWAY: CPT

## 2020-01-01 PROCEDURE — 93005 ELECTROCARDIOGRAM TRACING: CPT

## 2020-01-01 PROCEDURE — 86635 COCCIDIOIDES ANTIBODY: CPT

## 2020-01-01 PROCEDURE — 31500 INSERT EMERGENCY AIRWAY: CPT | Performed by: INTERNAL MEDICINE

## 2020-01-01 PROCEDURE — 94660 CPAP INITIATION&MGMT: CPT

## 2020-01-01 PROCEDURE — 99152 MOD SED SAME PHYS/QHP 5/>YRS: CPT | Performed by: INTERNAL MEDICINE

## 2020-01-01 PROCEDURE — 71260 CT THORAX DX C+: CPT

## 2020-01-01 PROCEDURE — 306580 SET INFUSION,POWERLOC 20G X.75: Performed by: INTERNAL MEDICINE

## 2020-01-01 PROCEDURE — 770022 HCHG ROOM/CARE - ICU (200)

## 2020-01-01 PROCEDURE — 85610 PROTHROMBIN TIME: CPT

## 2020-01-01 PROCEDURE — 87449 NOS EACH ORGANISM AG IA: CPT

## 2020-01-01 PROCEDURE — 700111 HCHG RX REV CODE 636 W/ 250 OVERRIDE (IP)

## 2020-01-01 PROCEDURE — 5A1935Z RESPIRATORY VENTILATION, LESS THAN 24 CONSECUTIVE HOURS: ICD-10-PCS | Performed by: INTERNAL MEDICINE

## 2020-01-01 PROCEDURE — 36600 WITHDRAWAL OF ARTERIAL BLOOD: CPT

## 2020-01-01 PROCEDURE — 99292 CRITICAL CARE ADDL 30 MIN: CPT | Mod: 25 | Performed by: INTERNAL MEDICINE

## 2020-01-01 PROCEDURE — 87633 RESP VIRUS 12-25 TARGETS: CPT

## 2020-01-01 PROCEDURE — 86606 ASPERGILLUS ANTIBODY: CPT | Mod: 91

## 2020-01-01 PROCEDURE — 31645 BRNCHSC W/THER ASPIR 1ST: CPT | Performed by: INTERNAL MEDICINE

## 2020-01-01 PROCEDURE — 94760 N-INVAS EAR/PLS OXIMETRY 1: CPT

## 2020-01-01 PROCEDURE — 87116 MYCOBACTERIA CULTURE: CPT

## 2020-01-01 PROCEDURE — 87581 M.PNEUMON DNA AMP PROBE: CPT

## 2020-01-01 PROCEDURE — 84443 ASSAY THYROID STIM HORMONE: CPT

## 2020-01-01 PROCEDURE — 0B918ZZ DRAINAGE OF TRACHEA, VIA NATURAL OR ARTIFICIAL OPENING ENDOSCOPIC: ICD-10-PCS | Performed by: INTERNAL MEDICINE

## 2020-01-01 PROCEDURE — 86140 C-REACTIVE PROTEIN: CPT

## 2020-01-01 PROCEDURE — 87086 URINE CULTURE/COLONY COUNT: CPT

## 2020-01-01 PROCEDURE — 88305 TISSUE EXAM BY PATHOLOGIST: CPT

## 2020-01-01 PROCEDURE — 93010 ELECTROCARDIOGRAM REPORT: CPT | Performed by: INTERNAL MEDICINE

## 2020-01-01 PROCEDURE — 87206 SMEAR FLUORESCENT/ACID STAI: CPT

## 2020-01-01 PROCEDURE — 302978 HCHG BRONCHOSCOPY-DIAGNOSTIC

## 2020-01-01 PROCEDURE — 99255 IP/OBS CONSLTJ NEW/EST HI 80: CPT | Performed by: INTERNAL MEDICINE

## 2020-01-01 PROCEDURE — 87486 CHLMYD PNEUM DNA AMP PROBE: CPT

## 2020-01-01 PROCEDURE — 86698 HISTOPLASMA ANTIBODY: CPT | Mod: 91

## 2020-01-01 PROCEDURE — 87502 INFLUENZA DNA AMP PROBE: CPT

## 2020-01-01 PROCEDURE — 87281 PNEUMOCYSTIS CARINII AG IF: CPT

## 2020-01-01 PROCEDURE — 84145 PROCALCITONIN (PCT): CPT

## 2020-01-01 PROCEDURE — 87389 HIV-1 AG W/HIV-1&-2 AB AG IA: CPT

## 2020-01-01 PROCEDURE — 82962 GLUCOSE BLOOD TEST: CPT

## 2020-01-01 PROCEDURE — 0B9D8ZX DRAINAGE OF RIGHT MIDDLE LUNG LOBE, VIA NATURAL OR ARTIFICIAL OPENING ENDOSCOPIC, DIAGNOSTIC: ICD-10-PCS | Performed by: INTERNAL MEDICINE

## 2020-01-01 PROCEDURE — 700111 HCHG RX REV CODE 636 W/ 250 OVERRIDE (IP): Performed by: RADIOLOGY

## 2020-01-01 PROCEDURE — 96365 THER/PROPH/DIAG IV INF INIT: CPT

## 2020-01-01 PROCEDURE — 0B9H8ZZ DRAINAGE OF LUNG LINGULA, VIA NATURAL OR ARTIFICIAL OPENING ENDOSCOPIC: ICD-10-PCS | Performed by: INTERNAL MEDICINE

## 2020-01-01 PROCEDURE — 88112 CYTOPATH CELL ENHANCE TECH: CPT

## 2020-01-01 PROCEDURE — 93005 ELECTROCARDIOGRAM TRACING: CPT | Performed by: EMERGENCY MEDICINE

## 2020-01-01 PROCEDURE — 84478 ASSAY OF TRIGLYCERIDES: CPT

## 2020-01-01 PROCEDURE — 87040 BLOOD CULTURE FOR BACTERIA: CPT | Mod: 91

## 2020-01-01 PROCEDURE — 83605 ASSAY OF LACTIC ACID: CPT | Mod: 91

## 2020-01-01 PROCEDURE — 87641 MR-STAPH DNA AMP PROBE: CPT

## 2020-01-01 PROCEDURE — 94150 VITAL CAPACITY TEST: CPT

## 2020-01-01 PROCEDURE — 31624 DX BRONCHOSCOPE/LAVAGE: CPT | Performed by: INTERNAL MEDICINE

## 2020-01-01 PROCEDURE — 87015 SPECIMEN INFECT AGNT CONCNTJ: CPT

## 2020-01-01 PROCEDURE — 85007 BL SMEAR W/DIFF WBC COUNT: CPT

## 2020-01-01 PROCEDURE — 0B9B8ZZ DRAINAGE OF LEFT LOWER LOBE BRONCHUS, VIA NATURAL OR ARTIFICIAL OPENING ENDOSCOPIC: ICD-10-PCS | Performed by: INTERNAL MEDICINE

## 2020-01-01 PROCEDURE — 87102 FUNGUS ISOLATION CULTURE: CPT

## 2020-01-01 PROCEDURE — 99223 1ST HOSP IP/OBS HIGH 75: CPT | Mod: AI | Performed by: INTERNAL MEDICINE

## 2020-01-01 PROCEDURE — 86356 MONONUCLEAR CELL ANTIGEN: CPT | Mod: 91

## 2020-01-01 PROCEDURE — 83880 ASSAY OF NATRIURETIC PEPTIDE: CPT

## 2020-01-01 PROCEDURE — 94002 VENT MGMT INPAT INIT DAY: CPT

## 2020-01-01 PROCEDURE — 770020 HCHG ROOM/CARE - TELE (206)

## 2020-01-01 PROCEDURE — 87070 CULTURE OTHR SPECIMN AEROBIC: CPT | Mod: 91

## 2020-01-01 PROCEDURE — 0B958ZZ DRAINAGE OF RIGHT MIDDLE LOBE BRONCHUS, VIA NATURAL OR ARTIFICIAL OPENING ENDOSCOPIC: ICD-10-PCS | Performed by: INTERNAL MEDICINE

## 2020-01-01 PROCEDURE — 700101 HCHG RX REV CODE 250

## 2020-01-01 PROCEDURE — 94761 N-INVAS EAR/PLS OXIMETRY MLT: CPT | Performed by: INTERNAL MEDICINE

## 2020-01-01 PROCEDURE — 87798 DETECT AGENT NOS DNA AMP: CPT | Mod: 91

## 2020-01-01 PROCEDURE — 99203 OFFICE O/P NEW LOW 30 MIN: CPT | Mod: 25 | Performed by: INTERNAL MEDICINE

## 2020-01-01 PROCEDURE — 99232 SBSQ HOSP IP/OBS MODERATE 35: CPT | Performed by: INTERNAL MEDICINE

## 2020-01-01 PROCEDURE — 83605 ASSAY OF LACTIC ACID: CPT

## 2020-01-01 PROCEDURE — 99285 EMERGENCY DEPT VISIT HI MDM: CPT

## 2020-01-01 PROCEDURE — 93005 ELECTROCARDIOGRAM TRACING: CPT | Performed by: INTERNAL MEDICINE

## 2020-01-01 PROCEDURE — 0BH17EZ INSERTION OF ENDOTRACHEAL AIRWAY INTO TRACHEA, VIA NATURAL OR ARTIFICIAL OPENING: ICD-10-PCS | Performed by: INTERNAL MEDICINE

## 2020-01-01 PROCEDURE — 99153 MOD SED SAME PHYS/QHP EA: CPT

## 2020-01-01 PROCEDURE — 80048 BASIC METABOLIC PNL TOTAL CA: CPT

## 2020-01-01 PROCEDURE — 83615 LACTATE (LD) (LDH) ENZYME: CPT

## 2020-01-01 PROCEDURE — 85025 COMPLETE CBC W/AUTO DIFF WBC: CPT

## 2020-01-01 RX ORDER — POLYETHYLENE GLYCOL 3350 17 G/17G
1 POWDER, FOR SOLUTION ORAL
Status: DISCONTINUED | OUTPATIENT
Start: 2020-01-01 | End: 2020-01-01

## 2020-01-01 RX ORDER — ONDANSETRON 2 MG/ML
4 INJECTION INTRAMUSCULAR; INTRAVENOUS EVERY 4 HOURS PRN
Status: DISCONTINUED | OUTPATIENT
Start: 2020-01-01 | End: 2020-01-01

## 2020-01-01 RX ORDER — FUROSEMIDE 10 MG/ML
20 INJECTION INTRAMUSCULAR; INTRAVENOUS ONCE
Status: COMPLETED | OUTPATIENT
Start: 2020-01-01 | End: 2020-01-01

## 2020-01-01 RX ORDER — ASCORBIC ACID 500 MG
1000 TABLET ORAL DAILY
Status: DISCONTINUED | OUTPATIENT
Start: 2020-01-01 | End: 2020-01-01

## 2020-01-01 RX ORDER — PREDNISONE 20 MG/1
40 TABLET ORAL DAILY
Status: DISCONTINUED | OUTPATIENT
Start: 2020-02-28 | End: 2020-01-01

## 2020-01-01 RX ORDER — MORPHINE SULFATE 4 MG/ML
4 INJECTION, SOLUTION INTRAMUSCULAR; INTRAVENOUS
Status: DISCONTINUED | OUTPATIENT
Start: 2020-01-01 | End: 2020-01-01

## 2020-01-01 RX ORDER — GUAIFENESIN/DEXTROMETHORPHAN 100-10MG/5
10 SYRUP ORAL EVERY 6 HOURS PRN
Status: DISCONTINUED | OUTPATIENT
Start: 2020-01-01 | End: 2020-01-01

## 2020-01-01 RX ORDER — BISACODYL 10 MG
10 SUPPOSITORY, RECTAL RECTAL
Status: DISCONTINUED | OUTPATIENT
Start: 2020-01-01 | End: 2020-01-01

## 2020-01-01 RX ORDER — ONDANSETRON 4 MG/1
4 TABLET, ORALLY DISINTEGRATING ORAL EVERY 4 HOURS PRN
Status: DISCONTINUED | OUTPATIENT
Start: 2020-01-01 | End: 2020-01-01

## 2020-01-01 RX ORDER — SODIUM CHLORIDE 9 MG/ML
INJECTION, SOLUTION INTRAVENOUS
Status: COMPLETED
Start: 2020-01-01 | End: 2020-01-01

## 2020-01-01 RX ORDER — PREDNISONE 50 MG/1
50 TABLET ORAL DAILY
Status: DISCONTINUED | OUTPATIENT
Start: 2020-01-01 | End: 2020-01-01

## 2020-01-01 RX ORDER — AMOXICILLIN 250 MG
2 CAPSULE ORAL 2 TIMES DAILY
Status: DISCONTINUED | OUTPATIENT
Start: 2020-01-01 | End: 2020-01-01

## 2020-01-01 RX ORDER — PROMETHAZINE HYDROCHLORIDE 25 MG/1
12.5-25 TABLET ORAL EVERY 4 HOURS PRN
Status: DISCONTINUED | OUTPATIENT
Start: 2020-01-01 | End: 2020-01-01

## 2020-01-01 RX ORDER — METHYLPREDNISOLONE SODIUM SUCCINATE 40 MG/ML
40 INJECTION, POWDER, LYOPHILIZED, FOR SOLUTION INTRAMUSCULAR; INTRAVENOUS EVERY 12 HOURS
Status: DISCONTINUED | OUTPATIENT
Start: 2020-01-01 | End: 2020-01-01

## 2020-01-01 RX ORDER — ALPRAZOLAM 0.5 MG/1
0.5 TABLET ORAL 3 TIMES DAILY PRN
Status: DISCONTINUED | OUTPATIENT
Start: 2020-01-01 | End: 2020-01-01

## 2020-01-01 RX ORDER — LINEZOLID 2 MG/ML
600 INJECTION, SOLUTION INTRAVENOUS EVERY 12 HOURS
Status: DISCONTINUED | OUTPATIENT
Start: 2020-01-01 | End: 2020-01-01

## 2020-01-01 RX ORDER — ONDANSETRON 2 MG/ML
4 INJECTION INTRAMUSCULAR; INTRAVENOUS EVERY 8 HOURS PRN
Status: DISCONTINUED | OUTPATIENT
Start: 2020-01-01 | End: 2020-01-01 | Stop reason: HOSPADM

## 2020-01-01 RX ORDER — MAGNESIUM SULFATE HEPTAHYDRATE 40 MG/ML
2 INJECTION, SOLUTION INTRAVENOUS ONCE
Status: COMPLETED | OUTPATIENT
Start: 2020-01-01 | End: 2020-01-01

## 2020-01-01 RX ORDER — ACETAMINOPHEN 325 MG/1
650 TABLET ORAL EVERY 6 HOURS PRN
Status: DISCONTINUED | OUTPATIENT
Start: 2020-01-01 | End: 2020-01-01

## 2020-01-01 RX ORDER — PREDNISONE 20 MG/1
20 TABLET ORAL DAILY
COMMUNITY
Start: 2020-01-01 | End: 2020-01-01

## 2020-01-01 RX ORDER — HALOPERIDOL 5 MG/ML
5 INJECTION INTRAMUSCULAR ONCE
Status: ACTIVE | OUTPATIENT
Start: 2020-01-01 | End: 2020-01-01

## 2020-01-01 RX ORDER — FUROSEMIDE 10 MG/ML
20 INJECTION INTRAMUSCULAR; INTRAVENOUS
Status: DISCONTINUED | OUTPATIENT
Start: 2020-01-01 | End: 2020-01-01

## 2020-01-01 RX ORDER — FAMOTIDINE 20 MG/1
20 TABLET, FILM COATED ORAL 2 TIMES DAILY
Status: DISCONTINUED | OUTPATIENT
Start: 2020-01-01 | End: 2020-01-01

## 2020-01-01 RX ORDER — SULFAMETHOXAZOLE AND TRIMETHOPRIM 800; 160 MG/1; MG/1
3 TABLET ORAL EVERY 8 HOURS
Status: DISCONTINUED | OUTPATIENT
Start: 2020-01-01 | End: 2020-01-01

## 2020-01-01 RX ORDER — MORPHINE SULFATE 4 MG/ML
2.5 INJECTION, SOLUTION INTRAMUSCULAR; INTRAVENOUS EVERY 4 HOURS PRN
Status: DISCONTINUED | OUTPATIENT
Start: 2020-01-01 | End: 2020-01-01

## 2020-01-01 RX ORDER — ACETAMINOPHEN 500 MG
1000 TABLET ORAL EVERY 6 HOURS
Status: DISCONTINUED | OUTPATIENT
Start: 2020-01-01 | End: 2020-01-01 | Stop reason: HOSPADM

## 2020-01-01 RX ORDER — OXYCODONE HYDROCHLORIDE 5 MG/1
5 TABLET ORAL
Status: DISCONTINUED | OUTPATIENT
Start: 2020-01-01 | End: 2020-01-01

## 2020-01-01 RX ORDER — HYDRALAZINE HYDROCHLORIDE 20 MG/ML
20 INJECTION INTRAMUSCULAR; INTRAVENOUS EVERY 4 HOURS PRN
Status: DISCONTINUED | OUTPATIENT
Start: 2020-01-01 | End: 2020-01-01

## 2020-01-01 RX ORDER — CEFAZOLIN SODIUM 1 G/3ML
INJECTION, POWDER, FOR SOLUTION INTRAMUSCULAR; INTRAVENOUS
Status: COMPLETED
Start: 2020-01-01 | End: 2020-01-01

## 2020-01-01 RX ORDER — PHENYLEPHRINE HCL IN 0.9% NACL 0.5 MG/5ML
SYRINGE (ML) INTRAVENOUS
Status: ACTIVE
Start: 2020-01-01 | End: 2020-01-01

## 2020-01-01 RX ORDER — CALCIUM CARBONATE 500 MG/1
500 TABLET, CHEWABLE ORAL DAILY
Status: DISCONTINUED | OUTPATIENT
Start: 2020-01-01 | End: 2020-01-01

## 2020-01-01 RX ORDER — OXYCODONE HYDROCHLORIDE 10 MG/1
10 TABLET ORAL
Status: DISCONTINUED | OUTPATIENT
Start: 2020-01-01 | End: 2020-01-01

## 2020-01-01 RX ORDER — SODIUM CHLORIDE 9 MG/ML
INJECTION, SOLUTION INTRAVENOUS
Status: ACTIVE
Start: 2020-01-01 | End: 2020-01-01

## 2020-01-01 RX ORDER — MORPHINE SULFATE 10 MG/ML
10 INJECTION, SOLUTION INTRAMUSCULAR; INTRAVENOUS
Status: DISCONTINUED | OUTPATIENT
Start: 2020-01-01 | End: 2020-01-01 | Stop reason: HOSPADM

## 2020-01-01 RX ORDER — SODIUM CHLORIDE, SODIUM LACTATE, POTASSIUM CHLORIDE, CALCIUM CHLORIDE 600; 310; 30; 20 MG/100ML; MG/100ML; MG/100ML; MG/100ML
INJECTION, SOLUTION INTRAVENOUS
Status: ACTIVE
Start: 2020-01-01 | End: 2020-01-01

## 2020-01-01 RX ORDER — VITAMIN B COMPLEX
2000 TABLET ORAL DAILY
Status: DISCONTINUED | OUTPATIENT
Start: 2020-01-01 | End: 2020-01-01

## 2020-01-01 RX ORDER — LINEZOLID 600 MG/1
600 TABLET, FILM COATED ORAL EVERY 12 HOURS
Status: DISCONTINUED | OUTPATIENT
Start: 2020-01-01 | End: 2020-01-01

## 2020-01-01 RX ORDER — LORAZEPAM 2 MG/ML
1-5 INJECTION INTRAMUSCULAR
Status: DISCONTINUED | OUTPATIENT
Start: 2020-01-01 | End: 2020-01-01 | Stop reason: HOSPADM

## 2020-01-01 RX ORDER — HYDRALAZINE HYDROCHLORIDE 10 MG/1
10 TABLET, FILM COATED ORAL EVERY 8 HOURS PRN
Status: DISCONTINUED | OUTPATIENT
Start: 2020-01-01 | End: 2020-01-01

## 2020-01-01 RX ORDER — POTASSIUM CHLORIDE 20 MEQ/1
40 TABLET, EXTENDED RELEASE ORAL ONCE
Status: COMPLETED | OUTPATIENT
Start: 2020-01-01 | End: 2020-01-01

## 2020-01-01 RX ORDER — LIDOCAINE HYDROCHLORIDE AND EPINEPHRINE 10; 10 MG/ML; UG/ML
INJECTION, SOLUTION INFILTRATION; PERINEURAL
Status: COMPLETED
Start: 2020-01-01 | End: 2020-01-01

## 2020-01-01 RX ORDER — IPRATROPIUM BROMIDE AND ALBUTEROL SULFATE 2.5; .5 MG/3ML; MG/3ML
3 SOLUTION RESPIRATORY (INHALATION)
Status: DISCONTINUED | OUTPATIENT
Start: 2020-01-01 | End: 2020-01-01

## 2020-01-01 RX ORDER — SODIUM CHLORIDE 9 MG/ML
500 INJECTION, SOLUTION INTRAVENOUS
Status: DISCONTINUED | OUTPATIENT
Start: 2020-01-01 | End: 2020-01-01 | Stop reason: HOSPADM

## 2020-01-01 RX ORDER — HYDROCODONE BITARTRATE AND ACETAMINOPHEN 5; 325 MG/1; MG/1
1-2 TABLET ORAL EVERY 8 HOURS PRN
COMMUNITY

## 2020-01-01 RX ORDER — MIDAZOLAM HYDROCHLORIDE 1 MG/ML
.5-2 INJECTION INTRAMUSCULAR; INTRAVENOUS PRN
Status: DISCONTINUED | OUTPATIENT
Start: 2020-01-01 | End: 2020-01-01 | Stop reason: HOSPADM

## 2020-01-01 RX ORDER — PHENYLEPHRINE HCL IN 0.9% NACL 0.5 MG/5ML
100 SYRINGE (ML) INTRAVENOUS
Status: ACTIVE | OUTPATIENT
Start: 2020-01-01 | End: 2020-01-01

## 2020-01-01 RX ORDER — MORPHINE SULFATE 4 MG/ML
2 INJECTION, SOLUTION INTRAMUSCULAR; INTRAVENOUS ONCE
Status: COMPLETED | OUTPATIENT
Start: 2020-01-01 | End: 2020-01-01

## 2020-01-01 RX ORDER — PROCHLORPERAZINE EDISYLATE 5 MG/ML
5-10 INJECTION INTRAMUSCULAR; INTRAVENOUS EVERY 4 HOURS PRN
Status: DISCONTINUED | OUTPATIENT
Start: 2020-01-01 | End: 2020-01-01

## 2020-01-01 RX ORDER — ASCORBIC ACID 500 MG
500 TABLET ORAL ONCE
Status: DISPENSED | OUTPATIENT
Start: 2020-01-01 | End: 2020-01-01

## 2020-01-01 RX ORDER — METHYLPREDNISOLONE SODIUM SUCCINATE 125 MG/2ML
125 INJECTION, POWDER, LYOPHILIZED, FOR SOLUTION INTRAMUSCULAR; INTRAVENOUS EVERY 6 HOURS
Status: DISCONTINUED | OUTPATIENT
Start: 2020-01-01 | End: 2020-01-01

## 2020-01-01 RX ORDER — ROCURONIUM BROMIDE 10 MG/ML
120 INJECTION, SOLUTION INTRAVENOUS ONCE
Status: COMPLETED | OUTPATIENT
Start: 2020-01-01 | End: 2020-01-01

## 2020-01-01 RX ORDER — ATROPINE SULFATE 10 MG/ML
2 SOLUTION/ DROPS OPHTHALMIC EVERY 4 HOURS PRN
Status: DISCONTINUED | OUTPATIENT
Start: 2020-01-01 | End: 2020-01-01 | Stop reason: HOSPADM

## 2020-01-01 RX ORDER — SODIUM CHLORIDE 9 MG/ML
INJECTION, SOLUTION INTRAVENOUS CONTINUOUS
Status: CANCELLED | OUTPATIENT
Start: 2020-01-01

## 2020-01-01 RX ORDER — PREDNISONE 20 MG/1
20 TABLET ORAL DAILY
Status: DISCONTINUED | OUTPATIENT
Start: 2020-03-06 | End: 2020-01-01

## 2020-01-01 RX ORDER — MORPHINE SULFATE 10 MG/ML
INJECTION, SOLUTION INTRAMUSCULAR; INTRAVENOUS
Status: COMPLETED
Start: 2020-01-01 | End: 2020-01-01

## 2020-01-01 RX ORDER — ANTIARTHRITIC COMBINATION NO.2 900 MG
1 TABLET ORAL DAILY
COMMUNITY

## 2020-01-01 RX ORDER — MORPHINE SULFATE 10 MG/ML
5 INJECTION, SOLUTION INTRAMUSCULAR; INTRAVENOUS
Status: DISCONTINUED | OUTPATIENT
Start: 2020-01-01 | End: 2020-01-01 | Stop reason: HOSPADM

## 2020-01-01 RX ORDER — POTASSIUM CHLORIDE 7.45 MG/ML
10 INJECTION INTRAVENOUS
Status: COMPLETED | OUTPATIENT
Start: 2020-01-01 | End: 2020-01-01

## 2020-01-01 RX ORDER — DEXMEDETOMIDINE HYDROCHLORIDE 4 UG/ML
.1-1.5 INJECTION, SOLUTION INTRAVENOUS CONTINUOUS
Status: DISCONTINUED | OUTPATIENT
Start: 2020-01-01 | End: 2020-01-01

## 2020-01-01 RX ORDER — LABETALOL HYDROCHLORIDE 5 MG/ML
INJECTION, SOLUTION INTRAVENOUS
Status: ACTIVE
Start: 2020-01-01 | End: 2020-01-01

## 2020-01-01 RX ORDER — LABETALOL HYDROCHLORIDE 5 MG/ML
10 INJECTION, SOLUTION INTRAVENOUS EVERY 4 HOURS PRN
Status: DISCONTINUED | OUTPATIENT
Start: 2020-01-01 | End: 2020-01-01

## 2020-01-01 RX ORDER — MORPHINE SULFATE 4 MG/ML
2.5 INJECTION, SOLUTION INTRAMUSCULAR; INTRAVENOUS
Status: DISCONTINUED | OUTPATIENT
Start: 2020-01-01 | End: 2020-01-01

## 2020-01-01 RX ORDER — OXYCODONE HYDROCHLORIDE 10 MG/1
10 TABLET ORAL
Status: DISCONTINUED | OUTPATIENT
Start: 2020-01-01 | End: 2020-01-01 | Stop reason: HOSPADM

## 2020-01-01 RX ORDER — FAMOTIDINE 20 MG/1
20 TABLET, FILM COATED ORAL EVERY 12 HOURS
Status: DISCONTINUED | OUTPATIENT
Start: 2020-01-01 | End: 2020-01-01

## 2020-01-01 RX ORDER — CEFAZOLIN SODIUM 2 G/100ML
2 INJECTION, SOLUTION INTRAVENOUS ONCE
Status: COMPLETED | OUTPATIENT
Start: 2020-01-01 | End: 2020-01-01

## 2020-01-01 RX ORDER — PROPOFOL 10 MG/ML
40 INJECTION, EMULSION INTRAVENOUS ONCE
Status: COMPLETED | OUTPATIENT
Start: 2020-01-01 | End: 2020-01-01

## 2020-01-01 RX ORDER — MIDAZOLAM HYDROCHLORIDE 1 MG/ML
INJECTION INTRAMUSCULAR; INTRAVENOUS
Status: COMPLETED
Start: 2020-01-01 | End: 2020-01-01

## 2020-01-01 RX ORDER — PROMETHAZINE HYDROCHLORIDE 25 MG/1
12.5-25 SUPPOSITORY RECTAL EVERY 4 HOURS PRN
Status: DISCONTINUED | OUTPATIENT
Start: 2020-01-01 | End: 2020-01-01

## 2020-01-01 RX ORDER — SODIUM CHLORIDE 9 MG/ML
INJECTION, SOLUTION INTRAVENOUS CONTINUOUS
Status: DISCONTINUED | OUTPATIENT
Start: 2020-01-01 | End: 2020-01-01 | Stop reason: HOSPADM

## 2020-01-01 RX ORDER — ONDANSETRON 2 MG/ML
4 INJECTION INTRAMUSCULAR; INTRAVENOUS PRN
Status: DISCONTINUED | OUTPATIENT
Start: 2020-01-01 | End: 2020-01-01 | Stop reason: HOSPADM

## 2020-01-01 RX ORDER — ETOMIDATE 2 MG/ML
18 INJECTION INTRAVENOUS ONCE
Status: COMPLETED | OUTPATIENT
Start: 2020-01-01 | End: 2020-01-01

## 2020-01-01 RX ORDER — LORAZEPAM 2 MG/ML
1 CONCENTRATE ORAL
Status: DISCONTINUED | OUTPATIENT
Start: 2020-01-01 | End: 2020-01-01 | Stop reason: HOSPADM

## 2020-01-01 RX ADMIN — OXYCODONE HYDROCHLORIDE AND ACETAMINOPHEN 1000 MG: 500 TABLET ORAL at 05:33

## 2020-01-01 RX ADMIN — SENNOSIDES AND DOCUSATE SODIUM 2 TABLET: 8.6; 5 TABLET ORAL at 05:50

## 2020-01-01 RX ADMIN — DEXMEDETOMIDINE HYDROCHLORIDE 0.5 MCG/KG/HR: 100 INJECTION, SOLUTION INTRAVENOUS at 06:41

## 2020-01-01 RX ADMIN — MIDAZOLAM HYDROCHLORIDE 2 MG: 1 INJECTION, SOLUTION INTRAMUSCULAR; INTRAVENOUS at 10:33

## 2020-01-01 RX ADMIN — MELATONIN 2000 UNITS: at 05:50

## 2020-01-01 RX ADMIN — ENOXAPARIN SODIUM 40 MG: 100 INJECTION SUBCUTANEOUS at 05:21

## 2020-01-01 RX ADMIN — IPRATROPIUM BROMIDE AND ALBUTEROL SULFATE 3 ML: .5; 3 SOLUTION RESPIRATORY (INHALATION) at 19:47

## 2020-01-01 RX ADMIN — OXYCODONE HYDROCHLORIDE AND ACETAMINOPHEN 1000 MG: 500 TABLET ORAL at 05:50

## 2020-01-01 RX ADMIN — CEFEPIME 2 G: 2 INJECTION, POWDER, FOR SOLUTION INTRAVENOUS at 06:46

## 2020-01-01 RX ADMIN — SENNOSIDES AND DOCUSATE SODIUM 2 TABLET: 8.6; 5 TABLET ORAL at 05:59

## 2020-01-01 RX ADMIN — CEFEPIME 2 G: 2 INJECTION, POWDER, FOR SOLUTION INTRAVENOUS at 14:31

## 2020-01-01 RX ADMIN — MORPHINE SULFATE 10 MG: 10 INJECTION INTRAVENOUS at 04:10

## 2020-01-01 RX ADMIN — IPRATROPIUM BROMIDE AND ALBUTEROL SULFATE 3 ML: .5; 3 SOLUTION RESPIRATORY (INHALATION) at 14:27

## 2020-01-01 RX ADMIN — SULFAMETHOXAZOLE AND TRIMETHOPRIM 336 MG OF TRIMETHOPRIM: 80; 16 INJECTION, SOLUTION, CONCENTRATE INTRAVENOUS at 20:53

## 2020-01-01 RX ADMIN — IPRATROPIUM BROMIDE AND ALBUTEROL SULFATE 3 ML: .5; 3 SOLUTION RESPIRATORY (INHALATION) at 13:38

## 2020-01-01 RX ADMIN — Medication 1.5 MG/HR: at 00:28

## 2020-01-01 RX ADMIN — ENOXAPARIN SODIUM 40 MG: 100 INJECTION SUBCUTANEOUS at 05:08

## 2020-01-01 RX ADMIN — OXYCODONE HYDROCHLORIDE AND ACETAMINOPHEN 1000 MG: 500 TABLET ORAL at 06:45

## 2020-01-01 RX ADMIN — ANTACID TABLETS 500 MG: 500 TABLET, CHEWABLE ORAL at 05:50

## 2020-01-01 RX ADMIN — ONDANSETRON 4 MG: 2 INJECTION INTRAMUSCULAR; INTRAVENOUS at 05:46

## 2020-01-01 RX ADMIN — LINEZOLID 600 MG: 600 TABLET, FILM COATED ORAL at 05:31

## 2020-01-01 RX ADMIN — SULFAMETHOXAZOLE AND TRIMETHOPRIM 336 MG OF TRIMETHOPRIM: 80; 16 INJECTION, SOLUTION, CONCENTRATE INTRAVENOUS at 19:25

## 2020-01-01 RX ADMIN — SULFAMETHOXAZOLE AND TRIMETHOPRIM 336 MG OF TRIMETHOPRIM: 80; 16 INJECTION, SOLUTION, CONCENTRATE INTRAVENOUS at 14:00

## 2020-01-01 RX ADMIN — IPRATROPIUM BROMIDE AND ALBUTEROL SULFATE 3 ML: .5; 3 SOLUTION RESPIRATORY (INHALATION) at 22:36

## 2020-01-01 RX ADMIN — IPRATROPIUM BROMIDE AND ALBUTEROL SULFATE 3 ML: .5; 3 SOLUTION RESPIRATORY (INHALATION) at 02:01

## 2020-01-01 RX ADMIN — SULFAMETHOXAZOLE AND TRIMETHOPRIM 336 MG OF TRIMETHOPRIM: 80; 16 INJECTION, SOLUTION, CONCENTRATE INTRAVENOUS at 15:41

## 2020-01-01 RX ADMIN — IPRATROPIUM BROMIDE AND ALBUTEROL SULFATE 3 ML: .5; 3 SOLUTION RESPIRATORY (INHALATION) at 23:28

## 2020-01-01 RX ADMIN — SULFAMETHOXAZOLE AND TRIMETHOPRIM 336 MG OF TRIMETHOPRIM: 80; 16 INJECTION, SOLUTION, CONCENTRATE INTRAVENOUS at 17:43

## 2020-01-01 RX ADMIN — SENNOSIDES AND DOCUSATE SODIUM 2 TABLET: 8.6; 5 TABLET ORAL at 05:08

## 2020-01-01 RX ADMIN — SODIUM CHLORIDE 500 ML: 9 INJECTION, SOLUTION INTRAVENOUS at 02:50

## 2020-01-01 RX ADMIN — SODIUM CHLORIDE 500 ML: 9 INJECTION, SOLUTION INTRAVENOUS at 08:38

## 2020-01-01 RX ADMIN — FAMOTIDINE 20 MG: 20 TABLET ORAL at 05:21

## 2020-01-01 RX ADMIN — MELATONIN 2000 UNITS: at 05:10

## 2020-01-01 RX ADMIN — Medication 1 MG/HR: at 21:29

## 2020-01-01 RX ADMIN — ENOXAPARIN SODIUM 40 MG: 100 INJECTION SUBCUTANEOUS at 05:09

## 2020-01-01 RX ADMIN — IPRATROPIUM BROMIDE AND ALBUTEROL SULFATE 3 ML: .5; 3 SOLUTION RESPIRATORY (INHALATION) at 10:24

## 2020-01-01 RX ADMIN — POLYETHYLENE GLYCOL 3350 1 PACKET: 17 POWDER, FOR SOLUTION ORAL at 17:25

## 2020-01-01 RX ADMIN — IPRATROPIUM BROMIDE AND ALBUTEROL SULFATE 3 ML: .5; 3 SOLUTION RESPIRATORY (INHALATION) at 22:15

## 2020-01-01 RX ADMIN — AMPICILLIN AND SULBACTAM 3 G: 2; 1 INJECTION, POWDER, FOR SOLUTION INTRAVENOUS at 00:29

## 2020-01-01 RX ADMIN — IPRATROPIUM BROMIDE AND ALBUTEROL SULFATE 3 ML: .5; 3 SOLUTION RESPIRATORY (INHALATION) at 10:38

## 2020-01-01 RX ADMIN — MORPHINE SULFATE: 10 INJECTION INTRAVENOUS at 03:50

## 2020-01-01 RX ADMIN — OXYCODONE HYDROCHLORIDE 5 MG: 5 TABLET ORAL at 11:54

## 2020-01-01 RX ADMIN — FUROSEMIDE 20 MG: 10 INJECTION, SOLUTION INTRAMUSCULAR; INTRAVENOUS at 05:50

## 2020-01-01 RX ADMIN — OXYCODONE HYDROCHLORIDE 5 MG: 5 TABLET ORAL at 17:30

## 2020-01-01 RX ADMIN — IPRATROPIUM BROMIDE AND ALBUTEROL SULFATE 3 ML: .5; 3 SOLUTION RESPIRATORY (INHALATION) at 03:23

## 2020-01-01 RX ADMIN — IPRATROPIUM BROMIDE AND ALBUTEROL SULFATE 3 ML: .5; 3 SOLUTION RESPIRATORY (INHALATION) at 03:00

## 2020-01-01 RX ADMIN — EPOPROSTENOL SODIUM 0.05 MCG/KG/MIN: 1.5 INJECTION, POWDER, LYOPHILIZED, FOR SOLUTION INTRAVENOUS at 16:00

## 2020-01-01 RX ADMIN — LINEZOLID 600 MG: 600 TABLET, FILM COATED ORAL at 17:47

## 2020-01-01 RX ADMIN — IPRATROPIUM BROMIDE AND ALBUTEROL SULFATE 3 ML: .5; 3 SOLUTION RESPIRATORY (INHALATION) at 18:41

## 2020-01-01 RX ADMIN — SULFAMETHOXAZOLE AND TRIMETHOPRIM 336 MG OF TRIMETHOPRIM: 80; 16 INJECTION, SOLUTION, CONCENTRATE INTRAVENOUS at 15:04

## 2020-01-01 RX ADMIN — IPRATROPIUM BROMIDE AND ALBUTEROL SULFATE 3 ML: .5; 3 SOLUTION RESPIRATORY (INHALATION) at 02:09

## 2020-01-01 RX ADMIN — METHYLPREDNISOLONE SODIUM SUCCINATE 40 MG: 40 INJECTION, POWDER, FOR SOLUTION INTRAMUSCULAR; INTRAVENOUS at 11:55

## 2020-01-01 RX ADMIN — FAMOTIDINE 20 MG: 20 TABLET ORAL at 06:46

## 2020-01-01 RX ADMIN — METHYLPREDNISOLONE SODIUM SUCCINATE 40 MG: 40 INJECTION, POWDER, FOR SOLUTION INTRAMUSCULAR; INTRAVENOUS at 17:07

## 2020-01-01 RX ADMIN — IPRATROPIUM BROMIDE AND ALBUTEROL SULFATE 3 ML: .5; 3 SOLUTION RESPIRATORY (INHALATION) at 01:50

## 2020-01-01 RX ADMIN — METHYLPREDNISOLONE SODIUM SUCCINATE 40 MG: 40 INJECTION, POWDER, FOR SOLUTION INTRAMUSCULAR; INTRAVENOUS at 05:34

## 2020-01-01 RX ADMIN — IPRATROPIUM BROMIDE AND ALBUTEROL SULFATE 3 ML: .5; 3 SOLUTION RESPIRATORY (INHALATION) at 02:31

## 2020-01-01 RX ADMIN — SULFAMETHOXAZOLE AND TRIMETHOPRIM 336 MG OF TRIMETHOPRIM: 80; 16 INJECTION, SOLUTION, CONCENTRATE INTRAVENOUS at 08:43

## 2020-01-01 RX ADMIN — IPRATROPIUM BROMIDE AND ALBUTEROL SULFATE 3 ML: .5; 3 SOLUTION RESPIRATORY (INHALATION) at 21:47

## 2020-01-01 RX ADMIN — LINEZOLID 600 MG: 600 TABLET, FILM COATED ORAL at 17:30

## 2020-01-01 RX ADMIN — IPRATROPIUM BROMIDE AND ALBUTEROL SULFATE 3 ML: .5; 3 SOLUTION RESPIRATORY (INHALATION) at 22:11

## 2020-01-01 RX ADMIN — IPRATROPIUM BROMIDE AND ALBUTEROL SULFATE 3 ML: .5; 3 SOLUTION RESPIRATORY (INHALATION) at 23:00

## 2020-01-01 RX ADMIN — IPRATROPIUM BROMIDE AND ALBUTEROL SULFATE 3 ML: .5; 3 SOLUTION RESPIRATORY (INHALATION) at 01:52

## 2020-01-01 RX ADMIN — FAMOTIDINE 20 MG: 20 TABLET ORAL at 05:59

## 2020-01-01 RX ADMIN — SULFAMETHOXAZOLE AND TRIMETHOPRIM 336 MG OF TRIMETHOPRIM: 80; 16 INJECTION, SOLUTION, CONCENTRATE INTRAVENOUS at 00:03

## 2020-01-01 RX ADMIN — IPRATROPIUM BROMIDE AND ALBUTEROL SULFATE 3 ML: .5; 3 SOLUTION RESPIRATORY (INHALATION) at 21:09

## 2020-01-01 RX ADMIN — PROPOFOL 40 MG: 10 INJECTION, EMULSION INTRAVENOUS at 17:36

## 2020-01-01 RX ADMIN — ENOXAPARIN SODIUM 40 MG: 100 INJECTION SUBCUTANEOUS at 06:46

## 2020-01-01 RX ADMIN — EPOPROSTENOL SODIUM 0.05 MCG/KG/MIN: 1.5 INJECTION, POWDER, LYOPHILIZED, FOR SOLUTION INTRAVENOUS at 10:23

## 2020-01-01 RX ADMIN — OXYCODONE HYDROCHLORIDE 5 MG: 5 TABLET ORAL at 07:16

## 2020-01-01 RX ADMIN — METHYLPREDNISOLONE SODIUM SUCCINATE 125 MG: 125 INJECTION, POWDER, FOR SOLUTION INTRAMUSCULAR; INTRAVENOUS at 19:42

## 2020-01-01 RX ADMIN — OXYCODONE HYDROCHLORIDE AND ACETAMINOPHEN 1000 MG: 500 TABLET ORAL at 05:58

## 2020-01-01 RX ADMIN — METHYLPREDNISOLONE SODIUM SUCCINATE 40 MG: 40 INJECTION, POWDER, FOR SOLUTION INTRAMUSCULAR; INTRAVENOUS at 05:59

## 2020-01-01 RX ADMIN — SODIUM CHLORIDE 1000 MG: 9 INJECTION, SOLUTION INTRAVENOUS at 05:42

## 2020-01-01 RX ADMIN — MIDAZOLAM HYDROCHLORIDE 2 MG: 1 INJECTION, SOLUTION INTRAMUSCULAR; INTRAVENOUS at 10:42

## 2020-01-01 RX ADMIN — IPRATROPIUM BROMIDE AND ALBUTEROL SULFATE 3 ML: .5; 3 SOLUTION RESPIRATORY (INHALATION) at 22:57

## 2020-01-01 RX ADMIN — IPRATROPIUM BROMIDE AND ALBUTEROL SULFATE 3 ML: .5; 3 SOLUTION RESPIRATORY (INHALATION) at 07:15

## 2020-01-01 RX ADMIN — SENNOSIDES AND DOCUSATE SODIUM 2 TABLET: 8.6; 5 TABLET ORAL at 05:46

## 2020-01-01 RX ADMIN — ANTACID TABLETS 500 MG: 500 TABLET, CHEWABLE ORAL at 05:33

## 2020-01-01 RX ADMIN — METHYLPREDNISOLONE SODIUM SUCCINATE 40 MG: 40 INJECTION, POWDER, FOR SOLUTION INTRAMUSCULAR; INTRAVENOUS at 05:50

## 2020-01-01 RX ADMIN — IPRATROPIUM BROMIDE AND ALBUTEROL SULFATE 3 ML: .5; 3 SOLUTION RESPIRATORY (INHALATION) at 07:12

## 2020-01-01 RX ADMIN — PROPOFOL 12 MCG/KG/MIN: 10 INJECTION, EMULSION INTRAVENOUS at 05:17

## 2020-01-01 RX ADMIN — IPRATROPIUM BROMIDE AND ALBUTEROL SULFATE 3 ML: .5; 3 SOLUTION RESPIRATORY (INHALATION) at 15:20

## 2020-01-01 RX ADMIN — ONDANSETRON 4 MG: 2 INJECTION INTRAMUSCULAR; INTRAVENOUS at 14:56

## 2020-01-01 RX ADMIN — AMPICILLIN AND SULBACTAM 3 G: 2; 1 INJECTION, POWDER, FOR SOLUTION INTRAVENOUS at 16:15

## 2020-01-01 RX ADMIN — MIDAZOLAM HYDROCHLORIDE 2 MG: 1 INJECTION, SOLUTION INTRAMUSCULAR; INTRAVENOUS at 10:48

## 2020-01-01 RX ADMIN — IPRATROPIUM BROMIDE AND ALBUTEROL SULFATE 3 ML: .5; 3 SOLUTION RESPIRATORY (INHALATION) at 22:29

## 2020-01-01 RX ADMIN — SULFAMETHOXAZOLE AND TRIMETHOPRIM 336 MG OF TRIMETHOPRIM: 80; 16 INJECTION, SOLUTION, CONCENTRATE INTRAVENOUS at 03:00

## 2020-01-01 RX ADMIN — FUROSEMIDE 20 MG: 10 INJECTION, SOLUTION INTRAMUSCULAR; INTRAVENOUS at 05:34

## 2020-01-01 RX ADMIN — SULFAMETHOXAZOLE AND TRIMETHOPRIM 336 MG OF TRIMETHOPRIM: 80; 16 INJECTION, SOLUTION, CONCENTRATE INTRAVENOUS at 13:03

## 2020-01-01 RX ADMIN — CEFEPIME 2 G: 2 INJECTION, POWDER, FOR SOLUTION INTRAVENOUS at 22:09

## 2020-01-01 RX ADMIN — ANTACID TABLETS 500 MG: 500 TABLET, CHEWABLE ORAL at 05:58

## 2020-01-01 RX ADMIN — ENOXAPARIN SODIUM 40 MG: 100 INJECTION SUBCUTANEOUS at 05:46

## 2020-01-01 RX ADMIN — METHYLPREDNISOLONE SODIUM SUCCINATE 125 MG: 125 INJECTION, POWDER, FOR SOLUTION INTRAMUSCULAR; INTRAVENOUS at 05:11

## 2020-01-01 RX ADMIN — IPRATROPIUM BROMIDE AND ALBUTEROL SULFATE 3 ML: .5; 3 SOLUTION RESPIRATORY (INHALATION) at 11:00

## 2020-01-01 RX ADMIN — METHYLPREDNISOLONE SODIUM SUCCINATE 125 MG: 125 INJECTION, POWDER, FOR SOLUTION INTRAMUSCULAR; INTRAVENOUS at 05:45

## 2020-01-01 RX ADMIN — IPRATROPIUM BROMIDE AND ALBUTEROL SULFATE 3 ML: .5; 3 SOLUTION RESPIRATORY (INHALATION) at 06:56

## 2020-01-01 RX ADMIN — SENNOSIDES AND DOCUSATE SODIUM 2 TABLET: 8.6; 5 TABLET ORAL at 17:47

## 2020-01-01 RX ADMIN — EPOPROSTENOL SODIUM 0.03 MCG/KG/MIN: 1.5 INJECTION, POWDER, LYOPHILIZED, FOR SOLUTION INTRAVENOUS at 03:58

## 2020-01-01 RX ADMIN — CEFEPIME 2 G: 2 INJECTION, POWDER, FOR SOLUTION INTRAVENOUS at 21:47

## 2020-01-01 RX ADMIN — FUROSEMIDE 20 MG: 10 INJECTION, SOLUTION INTRAMUSCULAR; INTRAVENOUS at 17:34

## 2020-01-01 RX ADMIN — FENTANYL CITRATE 100 MCG: 50 INJECTION, SOLUTION INTRAMUSCULAR; INTRAVENOUS at 17:38

## 2020-01-01 RX ADMIN — OXYCODONE HYDROCHLORIDE 5 MG: 5 TABLET ORAL at 19:41

## 2020-01-01 RX ADMIN — LABETALOL HYDROCHLORIDE 10 MG: 5 INJECTION, SOLUTION INTRAVENOUS at 18:05

## 2020-01-01 RX ADMIN — SULFAMETHOXAZOLE AND TRIMETHOPRIM 336 MG OF TRIMETHOPRIM: 80; 16 INJECTION, SOLUTION, CONCENTRATE INTRAVENOUS at 06:29

## 2020-01-01 RX ADMIN — IPRATROPIUM BROMIDE AND ALBUTEROL SULFATE 3 ML: .5; 3 SOLUTION RESPIRATORY (INHALATION) at 16:32

## 2020-01-01 RX ADMIN — SULFAMETHOXAZOLE AND TRIMETHOPRIM 336 MG OF TRIMETHOPRIM: 80; 16 INJECTION, SOLUTION, CONCENTRATE INTRAVENOUS at 18:36

## 2020-01-01 RX ADMIN — FUROSEMIDE 20 MG: 10 INJECTION, SOLUTION INTRAMUSCULAR; INTRAVENOUS at 16:33

## 2020-01-01 RX ADMIN — ONDANSETRON 4 MG: 2 INJECTION INTRAMUSCULAR; INTRAVENOUS at 10:04

## 2020-01-01 RX ADMIN — SULFAMETHOXAZOLE AND TRIMETHOPRIM 336 MG OF TRIMETHOPRIM: 80; 16 INJECTION, SOLUTION, CONCENTRATE INTRAVENOUS at 14:17

## 2020-01-01 RX ADMIN — SULFAMETHOXAZOLE AND TRIMETHOPRIM 336 MG OF TRIMETHOPRIM: 80; 16 INJECTION, SOLUTION, CONCENTRATE INTRAVENOUS at 02:21

## 2020-01-01 RX ADMIN — IPRATROPIUM BROMIDE AND ALBUTEROL SULFATE 3 ML: .5; 3 SOLUTION RESPIRATORY (INHALATION) at 06:21

## 2020-01-01 RX ADMIN — IPRATROPIUM BROMIDE AND ALBUTEROL SULFATE 3 ML: .5; 3 SOLUTION RESPIRATORY (INHALATION) at 10:53

## 2020-01-01 RX ADMIN — IPRATROPIUM BROMIDE AND ALBUTEROL SULFATE 3 ML: .5; 3 SOLUTION RESPIRATORY (INHALATION) at 14:53

## 2020-01-01 RX ADMIN — FAMOTIDINE 20 MG: 20 TABLET ORAL at 05:34

## 2020-01-01 RX ADMIN — MELATONIN 2000 UNITS: at 05:58

## 2020-01-01 RX ADMIN — PROPOFOL 15 MCG/KG/MIN: 10 INJECTION, EMULSION INTRAVENOUS at 18:11

## 2020-01-01 RX ADMIN — IPRATROPIUM BROMIDE AND ALBUTEROL SULFATE 3 ML: .5; 3 SOLUTION RESPIRATORY (INHALATION) at 13:30

## 2020-01-01 RX ADMIN — METHYLPREDNISOLONE SODIUM SUCCINATE 125 MG: 125 INJECTION, POWDER, FOR SOLUTION INTRAMUSCULAR; INTRAVENOUS at 00:29

## 2020-01-01 RX ADMIN — SENNOSIDES AND DOCUSATE SODIUM 2 TABLET: 8.6; 5 TABLET ORAL at 17:30

## 2020-01-01 RX ADMIN — FUROSEMIDE 20 MG: 10 INJECTION, SOLUTION INTRAMUSCULAR; INTRAVENOUS at 08:32

## 2020-01-01 RX ADMIN — SULFAMETHOXAZOLE AND TRIMETHOPRIM 336 MG OF TRIMETHOPRIM: 80; 16 INJECTION, SOLUTION, CONCENTRATE INTRAVENOUS at 13:06

## 2020-01-01 RX ADMIN — MELATONIN 2000 UNITS: at 05:31

## 2020-01-01 RX ADMIN — IPRATROPIUM BROMIDE AND ALBUTEROL SULFATE 3 ML: .5; 3 SOLUTION RESPIRATORY (INHALATION) at 22:21

## 2020-01-01 RX ADMIN — CEFAZOLIN 2 G: 330 INJECTION, POWDER, FOR SOLUTION INTRAMUSCULAR; INTRAVENOUS at 10:33

## 2020-01-01 RX ADMIN — SODIUM CHLORIDE: 9 INJECTION, SOLUTION INTRAVENOUS at 10:35

## 2020-01-01 RX ADMIN — MELATONIN 2000 UNITS: at 05:45

## 2020-01-01 RX ADMIN — OXYCODONE HYDROCHLORIDE 5 MG: 5 TABLET ORAL at 10:34

## 2020-01-01 RX ADMIN — CEFEPIME 2 G: 2 INJECTION, POWDER, FOR SOLUTION INTRAVENOUS at 05:31

## 2020-01-01 RX ADMIN — ROCURONIUM BROMIDE 120 MG: 10 INJECTION, SOLUTION INTRAVENOUS at 17:24

## 2020-01-01 RX ADMIN — ALPRAZOLAM 0.5 MG: 0.5 TABLET ORAL at 02:36

## 2020-01-01 RX ADMIN — MORPHINE SULFATE 4 MG: 4 INJECTION INTRAVENOUS at 17:08

## 2020-01-01 RX ADMIN — FENTANYL CITRATE 50 MCG: 0.05 INJECTION, SOLUTION INTRAMUSCULAR; INTRAVENOUS at 10:33

## 2020-01-01 RX ADMIN — SODIUM CHLORIDE 500 ML: 9 INJECTION, SOLUTION INTRAVENOUS at 21:35

## 2020-01-01 RX ADMIN — BISACODYL 10 MG: 10 SUPPOSITORY RECTAL at 10:56

## 2020-01-01 RX ADMIN — POTASSIUM CHLORIDE 40 MEQ: 1500 TABLET, EXTENDED RELEASE ORAL at 17:51

## 2020-01-01 RX ADMIN — LINEZOLID 600 MG: 600 TABLET, FILM COATED ORAL at 17:23

## 2020-01-01 RX ADMIN — IPRATROPIUM BROMIDE AND ALBUTEROL SULFATE 3 ML: .5; 3 SOLUTION RESPIRATORY (INHALATION) at 09:48

## 2020-01-01 RX ADMIN — IPRATROPIUM BROMIDE AND ALBUTEROL SULFATE 3 ML: .5; 3 SOLUTION RESPIRATORY (INHALATION) at 18:43

## 2020-01-01 RX ADMIN — IPRATROPIUM BROMIDE AND ALBUTEROL SULFATE 3 ML: .5; 3 SOLUTION RESPIRATORY (INHALATION) at 18:16

## 2020-01-01 RX ADMIN — SULFAMETHOXAZOLE AND TRIMETHOPRIM 336 MG OF TRIMETHOPRIM: 80; 16 INJECTION, SOLUTION, CONCENTRATE INTRAVENOUS at 08:53

## 2020-01-01 RX ADMIN — CEFEPIME 2 G: 2 INJECTION, POWDER, FOR SOLUTION INTRAVENOUS at 21:27

## 2020-01-01 RX ADMIN — LINEZOLID 600 MG: 600 INJECTION, SOLUTION INTRAVENOUS at 13:35

## 2020-01-01 RX ADMIN — OXYCODONE HYDROCHLORIDE 5 MG: 5 TABLET ORAL at 05:33

## 2020-01-01 RX ADMIN — METHYLPREDNISOLONE SODIUM SUCCINATE 40 MG: 40 INJECTION, POWDER, FOR SOLUTION INTRAMUSCULAR; INTRAVENOUS at 08:38

## 2020-01-01 RX ADMIN — IPRATROPIUM BROMIDE AND ALBUTEROL SULFATE 3 ML: .5; 3 SOLUTION RESPIRATORY (INHALATION) at 14:03

## 2020-01-01 RX ADMIN — EPOPROSTENOL SODIUM 0.03 MCG/KG/MIN: 1.5 INJECTION, POWDER, LYOPHILIZED, FOR SOLUTION INTRAVENOUS at 21:47

## 2020-01-01 RX ADMIN — OXYCODONE HYDROCHLORIDE AND ACETAMINOPHEN 1000 MG: 500 TABLET ORAL at 05:45

## 2020-01-01 RX ADMIN — IPRATROPIUM BROMIDE AND ALBUTEROL SULFATE 3 ML: .5; 3 SOLUTION RESPIRATORY (INHALATION) at 06:52

## 2020-01-01 RX ADMIN — MELATONIN 2000 UNITS: at 05:33

## 2020-01-01 RX ADMIN — OXYCODONE HYDROCHLORIDE 5 MG: 5 TABLET ORAL at 09:26

## 2020-01-01 RX ADMIN — IPRATROPIUM BROMIDE AND ALBUTEROL SULFATE 3 ML: .5; 3 SOLUTION RESPIRATORY (INHALATION) at 05:55

## 2020-01-01 RX ADMIN — SULFAMETHOXAZOLE AND TRIMETHOPRIM 3 TABLET: 800; 160 TABLET ORAL at 16:30

## 2020-01-01 RX ADMIN — SULFAMETHOXAZOLE AND TRIMETHOPRIM 336 MG OF TRIMETHOPRIM: 80; 16 INJECTION, SOLUTION, CONCENTRATE INTRAVENOUS at 14:53

## 2020-01-01 RX ADMIN — AMPICILLIN AND SULBACTAM 3 G: 2; 1 INJECTION, POWDER, FOR SOLUTION INTRAVENOUS at 11:45

## 2020-01-01 RX ADMIN — MELATONIN 2000 UNITS: at 05:08

## 2020-01-01 RX ADMIN — LINEZOLID 600 MG: 600 INJECTION, SOLUTION INTRAVENOUS at 05:57

## 2020-01-01 RX ADMIN — OXYCODONE HYDROCHLORIDE 5 MG: 5 TABLET ORAL at 21:27

## 2020-01-01 RX ADMIN — AMPICILLIN AND SULBACTAM 3 G: 2; 1 INJECTION, POWDER, FOR SOLUTION INTRAVENOUS at 05:10

## 2020-01-01 RX ADMIN — CEFEPIME 2 G: 2 INJECTION, POWDER, FOR SOLUTION INTRAVENOUS at 05:09

## 2020-01-01 RX ADMIN — FAMOTIDINE 20 MG: 20 TABLET ORAL at 17:14

## 2020-01-01 RX ADMIN — OXYCODONE HYDROCHLORIDE AND ACETAMINOPHEN 1000 MG: 500 TABLET ORAL at 05:11

## 2020-01-01 RX ADMIN — IPRATROPIUM BROMIDE AND ALBUTEROL SULFATE 3 ML: .5; 3 SOLUTION RESPIRATORY (INHALATION) at 10:02

## 2020-01-01 RX ADMIN — IPRATROPIUM BROMIDE AND ALBUTEROL SULFATE 3 ML: .5; 3 SOLUTION RESPIRATORY (INHALATION) at 10:28

## 2020-01-01 RX ADMIN — SENNOSIDES AND DOCUSATE SODIUM 2 TABLET: 8.6; 5 TABLET ORAL at 17:34

## 2020-01-01 RX ADMIN — FENTANYL CITRATE 100 MCG: 0.05 INJECTION, SOLUTION INTRAMUSCULAR; INTRAVENOUS at 17:27

## 2020-01-01 RX ADMIN — METHYLPREDNISOLONE SODIUM SUCCINATE 125 MG: 125 INJECTION, POWDER, FOR SOLUTION INTRAMUSCULAR; INTRAVENOUS at 17:49

## 2020-01-01 RX ADMIN — MORPHINE SULFATE 2 MG: 4 INJECTION INTRAVENOUS at 18:42

## 2020-01-01 RX ADMIN — FUROSEMIDE 20 MG: 10 INJECTION, SOLUTION INTRAMUSCULAR; INTRAVENOUS at 05:28

## 2020-01-01 RX ADMIN — FENTANYL CITRATE 50 MCG: 50 INJECTION, SOLUTION INTRAMUSCULAR; INTRAVENOUS at 10:48

## 2020-01-01 RX ADMIN — SENNOSIDES AND DOCUSATE SODIUM 2 TABLET: 8.6; 5 TABLET ORAL at 06:46

## 2020-01-01 RX ADMIN — SULFAMETHOXAZOLE AND TRIMETHOPRIM 336 MG OF TRIMETHOPRIM: 80; 16 INJECTION, SOLUTION, CONCENTRATE INTRAVENOUS at 18:07

## 2020-01-01 RX ADMIN — METHYLPREDNISOLONE SODIUM SUCCINATE 125 MG: 125 INJECTION, POWDER, FOR SOLUTION INTRAMUSCULAR; INTRAVENOUS at 11:45

## 2020-01-01 RX ADMIN — METHYLPREDNISOLONE SODIUM SUCCINATE 125 MG: 125 INJECTION, POWDER, FOR SOLUTION INTRAMUSCULAR; INTRAVENOUS at 00:08

## 2020-01-01 RX ADMIN — IPRATROPIUM BROMIDE AND ALBUTEROL SULFATE 3 ML: .5; 3 SOLUTION RESPIRATORY (INHALATION) at 18:42

## 2020-01-01 RX ADMIN — SODIUM CHLORIDE 1000 MG: 9 INJECTION, SOLUTION INTRAVENOUS at 05:49

## 2020-01-01 RX ADMIN — IPRATROPIUM BROMIDE AND ALBUTEROL SULFATE 3 ML: .5; 3 SOLUTION RESPIRATORY (INHALATION) at 16:15

## 2020-01-01 RX ADMIN — OXYCODONE HYDROCHLORIDE AND ACETAMINOPHEN 1000 MG: 500 TABLET ORAL at 05:31

## 2020-01-01 RX ADMIN — METHYLPREDNISOLONE SODIUM SUCCINATE 40 MG: 40 INJECTION, POWDER, FOR SOLUTION INTRAMUSCULAR; INTRAVENOUS at 17:34

## 2020-01-01 RX ADMIN — IPRATROPIUM BROMIDE AND ALBUTEROL SULFATE 3 ML: .5; 3 SOLUTION RESPIRATORY (INHALATION) at 20:13

## 2020-01-01 RX ADMIN — SULFAMETHOXAZOLE AND TRIMETHOPRIM 336 MG OF TRIMETHOPRIM: 80; 16 INJECTION, SOLUTION, CONCENTRATE INTRAVENOUS at 15:31

## 2020-01-01 RX ADMIN — CEFAZOLIN SODIUM 2 G: 2 INJECTION, SOLUTION INTRAVENOUS at 10:35

## 2020-01-01 RX ADMIN — PROPOFOL 40 MG: 10 INJECTION, EMULSION INTRAVENOUS at 17:30

## 2020-01-01 RX ADMIN — IPRATROPIUM BROMIDE AND ALBUTEROL SULFATE 3 ML: .5; 3 SOLUTION RESPIRATORY (INHALATION) at 18:15

## 2020-01-01 RX ADMIN — FAMOTIDINE 20 MG: 20 TABLET ORAL at 18:11

## 2020-01-01 RX ADMIN — LINEZOLID 600 MG: 600 TABLET, FILM COATED ORAL at 06:45

## 2020-01-01 RX ADMIN — FUROSEMIDE 20 MG: 10 INJECTION, SOLUTION INTRAMUSCULAR; INTRAVENOUS at 05:59

## 2020-01-01 RX ADMIN — IPRATROPIUM BROMIDE AND ALBUTEROL SULFATE 3 ML: .5; 3 SOLUTION RESPIRATORY (INHALATION) at 06:30

## 2020-01-01 RX ADMIN — FENTANYL CITRATE 50 MCG: 50 INJECTION, SOLUTION INTRAMUSCULAR; INTRAVENOUS at 10:33

## 2020-01-01 RX ADMIN — FAMOTIDINE 20 MG: 20 TABLET ORAL at 05:07

## 2020-01-01 RX ADMIN — ANTACID TABLETS 500 MG: 500 TABLET, CHEWABLE ORAL at 05:21

## 2020-01-01 RX ADMIN — CEFEPIME 2 G: 2 INJECTION, POWDER, FOR SOLUTION INTRAVENOUS at 14:19

## 2020-01-01 RX ADMIN — LIDOCAINE HYDROCHLORIDE,EPINEPHRINE BITARTRATE: 10; .01 INJECTION, SOLUTION INFILTRATION; PERINEURAL at 10:43

## 2020-01-01 RX ADMIN — FENTANYL CITRATE 50 MCG: 50 INJECTION, SOLUTION INTRAMUSCULAR; INTRAVENOUS at 10:42

## 2020-01-01 RX ADMIN — SULFAMETHOXAZOLE AND TRIMETHOPRIM 336 MG OF TRIMETHOPRIM: 80; 16 INJECTION, SOLUTION, CONCENTRATE INTRAVENOUS at 07:37

## 2020-01-01 RX ADMIN — FAMOTIDINE 20 MG: 20 TABLET ORAL at 17:01

## 2020-01-01 RX ADMIN — PROPOFOL 50 MCG/KG/MIN: 10 INJECTION, EMULSION INTRAVENOUS at 22:29

## 2020-01-01 RX ADMIN — FUROSEMIDE 20 MG: 10 INJECTION, SOLUTION INTRAMUSCULAR; INTRAVENOUS at 08:39

## 2020-01-01 RX ADMIN — SULFAMETHOXAZOLE AND TRIMETHOPRIM 336 MG OF TRIMETHOPRIM: 80; 16 INJECTION, SOLUTION, CONCENTRATE INTRAVENOUS at 00:08

## 2020-01-01 RX ADMIN — LINEZOLID 600 MG: 600 INJECTION, SOLUTION INTRAVENOUS at 18:32

## 2020-01-01 RX ADMIN — METHYLPREDNISOLONE SODIUM SUCCINATE 40 MG: 40 INJECTION, POWDER, FOR SOLUTION INTRAMUSCULAR; INTRAVENOUS at 05:28

## 2020-01-01 RX ADMIN — SULFAMETHOXAZOLE AND TRIMETHOPRIM 336 MG OF TRIMETHOPRIM: 80; 16 INJECTION, SOLUTION, CONCENTRATE INTRAVENOUS at 21:49

## 2020-01-01 RX ADMIN — CEFEPIME 2 G: 2 INJECTION, POWDER, FOR SOLUTION INTRAVENOUS at 05:01

## 2020-01-01 RX ADMIN — ONDANSETRON 4 MG: 4 TABLET, ORALLY DISINTEGRATING ORAL at 21:27

## 2020-01-01 RX ADMIN — SODIUM CHLORIDE: 9 INJECTION, SOLUTION INTRAVENOUS at 21:35

## 2020-01-01 RX ADMIN — SULFAMETHOXAZOLE AND TRIMETHOPRIM 336 MG OF TRIMETHOPRIM: 80; 16 INJECTION, SOLUTION, CONCENTRATE INTRAVENOUS at 08:27

## 2020-01-01 RX ADMIN — OXYCODONE HYDROCHLORIDE AND ACETAMINOPHEN 1000 MG: 500 TABLET ORAL at 05:07

## 2020-01-01 RX ADMIN — SULFAMETHOXAZOLE AND TRIMETHOPRIM 336 MG OF TRIMETHOPRIM: 80; 16 INJECTION, SOLUTION, CONCENTRATE INTRAVENOUS at 20:56

## 2020-01-01 RX ADMIN — FAMOTIDINE 20 MG: 20 TABLET ORAL at 05:50

## 2020-01-01 RX ADMIN — CEFEPIME 2 G: 2 INJECTION, POWDER, FOR SOLUTION INTRAVENOUS at 05:45

## 2020-01-01 RX ADMIN — IPRATROPIUM BROMIDE AND ALBUTEROL SULFATE 3 ML: .5; 3 SOLUTION RESPIRATORY (INHALATION) at 02:50

## 2020-01-01 RX ADMIN — METHYLPREDNISOLONE SODIUM SUCCINATE 40 MG: 40 INJECTION, POWDER, FOR SOLUTION INTRAMUSCULAR; INTRAVENOUS at 17:14

## 2020-01-01 RX ADMIN — MORPHINE SULFATE 2.5 MG: 4 INJECTION INTRAVENOUS at 14:56

## 2020-01-01 RX ADMIN — POTASSIUM CHLORIDE 10 MEQ: 7.46 INJECTION, SOLUTION INTRAVENOUS at 10:40

## 2020-01-01 RX ADMIN — IPRATROPIUM BROMIDE AND ALBUTEROL SULFATE 3 ML: .5; 3 SOLUTION RESPIRATORY (INHALATION) at 15:36

## 2020-01-01 RX ADMIN — IPRATROPIUM BROMIDE AND ALBUTEROL SULFATE 3 ML: .5; 3 SOLUTION RESPIRATORY (INHALATION) at 22:51

## 2020-01-01 RX ADMIN — MORPHINE SULFATE 2.5 MG: 4 INJECTION INTRAVENOUS at 03:00

## 2020-01-01 RX ADMIN — ACETAMINOPHEN 650 MG: 325 TABLET, FILM COATED ORAL at 15:43

## 2020-01-01 RX ADMIN — OXYCODONE HYDROCHLORIDE 5 MG: 5 TABLET ORAL at 00:07

## 2020-01-01 RX ADMIN — IPRATROPIUM BROMIDE AND ALBUTEROL SULFATE 3 ML: .5; 3 SOLUTION RESPIRATORY (INHALATION) at 18:38

## 2020-01-01 RX ADMIN — FENTANYL CITRATE 25 MCG: 0.05 INJECTION, SOLUTION INTRAMUSCULAR; INTRAVENOUS at 21:02

## 2020-01-01 RX ADMIN — IPRATROPIUM BROMIDE AND ALBUTEROL SULFATE 3 ML: .5; 3 SOLUTION RESPIRATORY (INHALATION) at 10:32

## 2020-01-01 RX ADMIN — SULFAMETHOXAZOLE AND TRIMETHOPRIM 3 TABLET: 800; 160 TABLET ORAL at 21:00

## 2020-01-01 RX ADMIN — ALTEPLASE 0.7 MG: 2.2 INJECTION, POWDER, LYOPHILIZED, FOR SOLUTION INTRAVENOUS at 13:25

## 2020-01-01 RX ADMIN — SULFAMETHOXAZOLE AND TRIMETHOPRIM 336 MG OF TRIMETHOPRIM: 80; 16 INJECTION, SOLUTION, CONCENTRATE INTRAVENOUS at 12:21

## 2020-01-01 RX ADMIN — FUROSEMIDE 20 MG: 20 INJECTION, SOLUTION INTRAMUSCULAR; INTRAVENOUS at 07:37

## 2020-01-01 RX ADMIN — IOHEXOL 100 ML: 350 INJECTION, SOLUTION INTRAVENOUS at 09:15

## 2020-01-01 RX ADMIN — SULFAMETHOXAZOLE AND TRIMETHOPRIM 336 MG OF TRIMETHOPRIM: 80; 16 INJECTION, SOLUTION, CONCENTRATE INTRAVENOUS at 02:54

## 2020-01-01 RX ADMIN — ONDANSETRON 4 MG: 2 INJECTION INTRAMUSCULAR; INTRAVENOUS at 16:48

## 2020-01-01 RX ADMIN — SULFAMETHOXAZOLE AND TRIMETHOPRIM 336 MG OF TRIMETHOPRIM: 80; 16 INJECTION, SOLUTION, CONCENTRATE INTRAVENOUS at 00:10

## 2020-01-01 RX ADMIN — IPRATROPIUM BROMIDE AND ALBUTEROL SULFATE 3 ML: .5; 3 SOLUTION RESPIRATORY (INHALATION) at 19:00

## 2020-01-01 RX ADMIN — IPRATROPIUM BROMIDE AND ALBUTEROL SULFATE 3 ML: .5; 3 SOLUTION RESPIRATORY (INHALATION) at 07:06

## 2020-01-01 RX ADMIN — SODIUM CHLORIDE 1000 MG: 9 INJECTION, SOLUTION INTRAVENOUS at 07:19

## 2020-01-01 RX ADMIN — IPRATROPIUM BROMIDE AND ALBUTEROL SULFATE 3 ML: .5; 3 SOLUTION RESPIRATORY (INHALATION) at 02:49

## 2020-01-01 RX ADMIN — SENNOSIDES AND DOCUSATE SODIUM 2 TABLET: 8.6; 5 TABLET ORAL at 05:33

## 2020-01-01 RX ADMIN — FUROSEMIDE 20 MG: 10 INJECTION, SOLUTION INTRAMUSCULAR; INTRAVENOUS at 17:30

## 2020-01-01 RX ADMIN — IPRATROPIUM BROMIDE AND ALBUTEROL SULFATE 3 ML: .5; 3 SOLUTION RESPIRATORY (INHALATION) at 22:40

## 2020-01-01 RX ADMIN — SULFAMETHOXAZOLE AND TRIMETHOPRIM 336 MG OF TRIMETHOPRIM: 80; 16 INJECTION, SOLUTION, CONCENTRATE INTRAVENOUS at 02:47

## 2020-01-01 RX ADMIN — FUROSEMIDE 20 MG: 10 INJECTION, SOLUTION INTRAMUSCULAR; INTRAVENOUS at 17:00

## 2020-01-01 RX ADMIN — ANTACID TABLETS 500 MG: 500 TABLET, CHEWABLE ORAL at 18:11

## 2020-01-01 RX ADMIN — FAMOTIDINE 20 MG: 20 TABLET ORAL at 17:34

## 2020-01-01 RX ADMIN — FUROSEMIDE 20 MG: 10 INJECTION, SOLUTION INTRAMUSCULAR; INTRAVENOUS at 18:06

## 2020-01-01 RX ADMIN — METHYLPREDNISOLONE SODIUM SUCCINATE 40 MG: 40 INJECTION, POWDER, FOR SOLUTION INTRAMUSCULAR; INTRAVENOUS at 17:51

## 2020-01-01 RX ADMIN — SULFAMETHOXAZOLE AND TRIMETHOPRIM 336 MG OF TRIMETHOPRIM: 80; 16 INJECTION, SOLUTION, CONCENTRATE INTRAVENOUS at 08:40

## 2020-01-01 RX ADMIN — SULFAMETHOXAZOLE AND TRIMETHOPRIM 336 MG OF TRIMETHOPRIM: 80; 16 INJECTION, SOLUTION, CONCENTRATE INTRAVENOUS at 02:04

## 2020-01-01 RX ADMIN — HEPARIN 500 UNITS: 100 SYRINGE at 11:06

## 2020-01-01 RX ADMIN — ONDANSETRON 4 MG: 2 INJECTION INTRAMUSCULAR; INTRAVENOUS at 01:21

## 2020-01-01 RX ADMIN — HYDRALAZINE HYDROCHLORIDE 20 MG: 20 INJECTION INTRAMUSCULAR; INTRAVENOUS at 20:23

## 2020-01-01 RX ADMIN — MORPHINE SULFATE 10 MG: 10 INJECTION INTRAVENOUS at 04:00

## 2020-01-01 RX ADMIN — METHYLPREDNISOLONE SODIUM SUCCINATE 125 MG: 125 INJECTION, POWDER, FOR SOLUTION INTRAMUSCULAR; INTRAVENOUS at 13:02

## 2020-01-01 RX ADMIN — CEFEPIME 2 G: 2 INJECTION, POWDER, FOR SOLUTION INTRAVENOUS at 22:32

## 2020-01-01 RX ADMIN — ENOXAPARIN SODIUM 40 MG: 100 INJECTION SUBCUTANEOUS at 05:31

## 2020-01-01 RX ADMIN — SENNOSIDES AND DOCUSATE SODIUM 2 TABLET: 8.6; 5 TABLET ORAL at 05:31

## 2020-01-01 RX ADMIN — IPRATROPIUM BROMIDE AND ALBUTEROL SULFATE 3 ML: .5; 3 SOLUTION RESPIRATORY (INHALATION) at 11:08

## 2020-01-01 RX ADMIN — FENTANYL CITRATE 100 MCG: 0.05 INJECTION, SOLUTION INTRAMUSCULAR; INTRAVENOUS at 17:43

## 2020-01-01 RX ADMIN — FENTANYL CITRATE 50 MCG: 50 INJECTION, SOLUTION INTRAMUSCULAR; INTRAVENOUS at 10:45

## 2020-01-01 RX ADMIN — ETOMIDATE 18 MG: 2 INJECTION INTRAVENOUS at 17:23

## 2020-01-01 RX ADMIN — IPRATROPIUM BROMIDE AND ALBUTEROL SULFATE 3 ML: .5; 3 SOLUTION RESPIRATORY (INHALATION) at 14:34

## 2020-01-01 RX ADMIN — IOHEXOL 25 ML: 240 INJECTION, SOLUTION INTRATHECAL; INTRAVASCULAR; INTRAVENOUS; ORAL at 09:15

## 2020-01-01 RX ADMIN — IPRATROPIUM BROMIDE AND ALBUTEROL SULFATE 3 ML: .5; 3 SOLUTION RESPIRATORY (INHALATION) at 15:00

## 2020-01-01 RX ADMIN — IPRATROPIUM BROMIDE AND ALBUTEROL SULFATE 3 ML: .5; 3 SOLUTION RESPIRATORY (INHALATION) at 10:10

## 2020-01-01 RX ADMIN — SENNOSIDES AND DOCUSATE SODIUM 2 TABLET: 8.6; 5 TABLET ORAL at 05:10

## 2020-01-01 RX ADMIN — CEFEPIME 2 G: 2 INJECTION, POWDER, FOR SOLUTION INTRAVENOUS at 22:22

## 2020-01-01 RX ADMIN — IPRATROPIUM BROMIDE AND ALBUTEROL SULFATE 3 ML: .5; 3 SOLUTION RESPIRATORY (INHALATION) at 20:02

## 2020-01-01 RX ADMIN — DEXMEDETOMIDINE HYDROCHLORIDE 0.5 MCG/KG/HR: 100 INJECTION, SOLUTION INTRAVENOUS at 00:51

## 2020-01-01 RX ADMIN — LINEZOLID 600 MG: 600 TABLET, FILM COATED ORAL at 05:09

## 2020-01-01 RX ADMIN — MELATONIN 2000 UNITS: at 06:46

## 2020-01-01 RX ADMIN — SENNOSIDES AND DOCUSATE SODIUM 2 TABLET: 8.6; 5 TABLET ORAL at 17:14

## 2020-01-01 RX ADMIN — IPRATROPIUM BROMIDE AND ALBUTEROL SULFATE 3 ML: .5; 3 SOLUTION RESPIRATORY (INHALATION) at 10:15

## 2020-01-01 RX ADMIN — FUROSEMIDE 20 MG: 10 INJECTION, SOLUTION INTRAMUSCULAR; INTRAVENOUS at 17:07

## 2020-01-01 RX ADMIN — ENOXAPARIN SODIUM 40 MG: 100 INJECTION SUBCUTANEOUS at 05:11

## 2020-01-01 RX ADMIN — SULFAMETHOXAZOLE AND TRIMETHOPRIM 336 MG OF TRIMETHOPRIM: 80; 16 INJECTION, SOLUTION, CONCENTRATE INTRAVENOUS at 05:18

## 2020-01-01 RX ADMIN — SULFAMETHOXAZOLE AND TRIMETHOPRIM 336 MG OF TRIMETHOPRIM: 80; 16 INJECTION, SOLUTION, CONCENTRATE INTRAVENOUS at 20:59

## 2020-01-01 RX ADMIN — OXYCODONE HYDROCHLORIDE 5 MG: 5 TABLET ORAL at 20:11

## 2020-01-01 RX ADMIN — IPRATROPIUM BROMIDE AND ALBUTEROL SULFATE 3 ML: .5; 3 SOLUTION RESPIRATORY (INHALATION) at 08:23

## 2020-01-01 RX ADMIN — ENOXAPARIN SODIUM 40 MG: 100 INJECTION SUBCUTANEOUS at 05:59

## 2020-01-01 RX ADMIN — IPRATROPIUM BROMIDE AND ALBUTEROL SULFATE 3 ML: .5; 3 SOLUTION RESPIRATORY (INHALATION) at 18:30

## 2020-01-01 RX ADMIN — OXYCODONE HYDROCHLORIDE 5 MG: 5 TABLET ORAL at 17:25

## 2020-01-01 RX ADMIN — ONDANSETRON 4 MG: 2 INJECTION INTRAMUSCULAR; INTRAVENOUS at 23:57

## 2020-01-01 RX ADMIN — FUROSEMIDE 20 MG: 10 INJECTION, SOLUTION INTRAMUSCULAR; INTRAVENOUS at 16:30

## 2020-01-01 RX ADMIN — MELATONIN 2000 UNITS: at 05:21

## 2020-01-01 RX ADMIN — METHYLPREDNISOLONE SODIUM SUCCINATE 40 MG: 40 INJECTION, POWDER, FOR SOLUTION INTRAMUSCULAR; INTRAVENOUS at 17:01

## 2020-01-01 RX ADMIN — FUROSEMIDE 20 MG: 10 INJECTION, SOLUTION INTRAMUSCULAR; INTRAVENOUS at 15:41

## 2020-01-01 RX ADMIN — IPRATROPIUM BROMIDE AND ALBUTEROL SULFATE 3 ML: .5; 3 SOLUTION RESPIRATORY (INHALATION) at 06:28

## 2020-01-01 RX ADMIN — ENOXAPARIN SODIUM 40 MG: 100 INJECTION SUBCUTANEOUS at 05:51

## 2020-01-01 RX ADMIN — IPRATROPIUM BROMIDE AND ALBUTEROL SULFATE 3 ML: .5; 3 SOLUTION RESPIRATORY (INHALATION) at 02:59

## 2020-01-01 RX ADMIN — CEFEPIME 2 G: 2 INJECTION, POWDER, FOR SOLUTION INTRAVENOUS at 13:35

## 2020-01-01 RX ADMIN — LINEZOLID 600 MG: 600 INJECTION, SOLUTION INTRAVENOUS at 05:21

## 2020-01-01 RX ADMIN — IPRATROPIUM BROMIDE AND ALBUTEROL SULFATE 3 ML: .5; 3 SOLUTION RESPIRATORY (INHALATION) at 19:08

## 2020-01-01 RX ADMIN — IOHEXOL 55 ML: 350 INJECTION, SOLUTION INTRAVENOUS at 22:35

## 2020-01-01 RX ADMIN — MORPHINE SULFATE 4 MG: 4 INJECTION INTRAVENOUS at 20:33

## 2020-01-01 RX ADMIN — FENTANYL CITRATE 100 MCG: 50 INJECTION, SOLUTION INTRAMUSCULAR; INTRAVENOUS at 17:27

## 2020-01-01 RX ADMIN — MORPHINE SULFATE 2.5 MG: 4 INJECTION INTRAVENOUS at 23:50

## 2020-01-01 RX ADMIN — OXYCODONE HYDROCHLORIDE AND ACETAMINOPHEN 1000 MG: 500 TABLET ORAL at 05:10

## 2020-01-01 RX ADMIN — MELATONIN 2000 UNITS: at 05:11

## 2020-01-01 RX ADMIN — ENOXAPARIN SODIUM 40 MG: 100 INJECTION SUBCUTANEOUS at 05:27

## 2020-01-01 RX ADMIN — METHYLPREDNISOLONE SODIUM SUCCINATE 40 MG: 40 INJECTION, POWDER, FOR SOLUTION INTRAMUSCULAR; INTRAVENOUS at 18:06

## 2020-01-01 RX ADMIN — OXYCODONE HYDROCHLORIDE AND ACETAMINOPHEN 1000 MG: 500 TABLET ORAL at 05:21

## 2020-01-01 RX ADMIN — FAMOTIDINE 20 MG: 10 INJECTION INTRAVENOUS at 18:30

## 2020-01-01 RX ADMIN — FENTANYL CITRATE 50 MCG: 0.05 INJECTION, SOLUTION INTRAMUSCULAR; INTRAVENOUS at 00:05

## 2020-01-01 RX ADMIN — ALPRAZOLAM 0.5 MG: 0.5 TABLET ORAL at 20:58

## 2020-01-01 RX ADMIN — OXYCODONE HYDROCHLORIDE 5 MG: 5 TABLET ORAL at 15:43

## 2020-01-01 RX ADMIN — ENOXAPARIN SODIUM 40 MG: 100 INJECTION SUBCUTANEOUS at 05:34

## 2020-01-01 RX ADMIN — FUROSEMIDE 20 MG: 10 INJECTION, SOLUTION INTRAMUSCULAR; INTRAVENOUS at 17:13

## 2020-01-01 RX ADMIN — POTASSIUM CHLORIDE 10 MEQ: 7.46 INJECTION, SOLUTION INTRAVENOUS at 11:41

## 2020-01-01 RX ADMIN — MAGNESIUM SULFATE IN WATER 2 G: 40 INJECTION, SOLUTION INTRAVENOUS at 09:41

## 2020-01-01 RX ADMIN — CEFEPIME 2 G: 2 INJECTION, POWDER, FOR SOLUTION INTRAVENOUS at 14:54

## 2020-01-01 RX ADMIN — IPRATROPIUM BROMIDE AND ALBUTEROL SULFATE 3 ML: .5; 3 SOLUTION RESPIRATORY (INHALATION) at 07:31

## 2020-01-01 RX ADMIN — OXYCODONE HYDROCHLORIDE 5 MG: 5 TABLET ORAL at 13:19

## 2020-01-01 RX ADMIN — SODIUM CHLORIDE 500 ML: 9 INJECTION, SOLUTION INTRAVENOUS at 00:30

## 2020-01-01 ASSESSMENT — PULMONARY FUNCTION TESTS
EPAP_CMH2O: 10
EPAP_CMH2O: 8
EPAP_CMH2O: 10
EPAP_CMH2O: 8
EPAP_CMH2O: 10
FVC: 1.33
EPAP_CMH2O: 10
EPAP_CMH2O: 8
EPAP_CMH2O: 10
EPAP_CMH2O: 8
EPAP_CMH2O: 10
EPAP_CMH2O: 8
EPAP_CMH2O: 10

## 2020-01-01 ASSESSMENT — ENCOUNTER SYMPTOMS
SHORTNESS OF BREATH: 1
HEADACHES: 0
SHORTNESS OF BREATH: 0
TREMORS: 0
CHILLS: 0
TREMORS: 0
FEVER: 0
HEADACHES: 0
VOMITING: 0
DIARRHEA: 0
NERVOUS/ANXIOUS: 0
NERVOUS/ANXIOUS: 0
FEVER: 0
DIARRHEA: 0
DIZZINESS: 0
COUGH: 1
DIZZINESS: 0
STRIDOR: 0
BLURRED VISION: 0
EYE PAIN: 0
DIARRHEA: 0
VOMITING: 0
SPUTUM PRODUCTION: 0
EYE DISCHARGE: 0
NAUSEA: 0
WEAKNESS: 0
NECK PAIN: 0
COUGH: 1
SHORTNESS OF BREATH: 1
SENSORY CHANGE: 0
CONSTIPATION: 0
VOMITING: 0
HALLUCINATIONS: 0
FEVER: 0
SPUTUM PRODUCTION: 0
CONSTIPATION: 0
CHILLS: 0
HALLUCINATIONS: 0
HEADACHES: 0
DIARRHEA: 0
SPEECH CHANGE: 0
HALLUCINATIONS: 0
EYE DISCHARGE: 0
DEPRESSION: 0
STRIDOR: 0
EYE REDNESS: 0
HEARTBURN: 1
FEVER: 0
FALLS: 0
DIZZINESS: 0
NAUSEA: 0
MYALGIAS: 0
SHORTNESS OF BREATH: 1
NERVOUS/ANXIOUS: 0
DEPRESSION: 0
SHORTNESS OF BREATH: 1
SPUTUM PRODUCTION: 0
FEVER: 0
PALPITATIONS: 0
CHILLS: 0
HEMOPTYSIS: 0
BACK PAIN: 0
DIARRHEA: 0
DOUBLE VISION: 0
WEAKNESS: 0
SEIZURES: 0
ABDOMINAL PAIN: 0
MYALGIAS: 0
BLURRED VISION: 0
VOMITING: 0
VOMITING: 0
INSOMNIA: 0
DEPRESSION: 0
COUGH: 0
DIZZINESS: 0
TREMORS: 0
VOMITING: 0
DOUBLE VISION: 0
FOCAL WEAKNESS: 0
CHILLS: 0
HEADACHES: 0
EYE DISCHARGE: 0
CONSTIPATION: 0
BLURRED VISION: 0
COUGH: 0
HEARTBURN: 0
COUGH: 1
WEAKNESS: 1
ORTHOPNEA: 0
COUGH: 1
HALLUCINATIONS: 0
INSOMNIA: 0
DIZZINESS: 0
NERVOUS/ANXIOUS: 1
MYALGIAS: 0
FEVER: 0
SPEECH CHANGE: 0
DEPRESSION: 0
FOCAL WEAKNESS: 0
SPUTUM PRODUCTION: 0
FLANK PAIN: 0
DOUBLE VISION: 0
CONSTIPATION: 0
HEARTBURN: 0
COUGH: 0
COUGH: 1
INSOMNIA: 0
ABDOMINAL PAIN: 0
SHORTNESS OF BREATH: 1
FEVER: 0
VOMITING: 0
HALLUCINATIONS: 0
FOCAL WEAKNESS: 0
WHEEZING: 0
SHORTNESS OF BREATH: 1
VOMITING: 0
CHILLS: 0
INSOMNIA: 0
HEARTBURN: 0
DOUBLE VISION: 0
NERVOUS/ANXIOUS: 0
WEAKNESS: 0
VOMITING: 0
ABDOMINAL PAIN: 0
TREMORS: 0
FEVER: 0
HEADACHES: 0
SPEECH CHANGE: 0
DIZZINESS: 0
NAUSEA: 0
INSOMNIA: 0
WEAKNESS: 0
NAUSEA: 0
HEMOPTYSIS: 0
COUGH: 1
DIZZINESS: 0
WEAKNESS: 1
DOUBLE VISION: 0
INSOMNIA: 0
ABDOMINAL PAIN: 0
ABDOMINAL PAIN: 0
HEADACHES: 0
NAUSEA: 0
DIZZINESS: 0
DEPRESSION: 0
PHOTOPHOBIA: 0
SPEECH CHANGE: 0
COUGH: 1
SENSORY CHANGE: 0
SENSORY CHANGE: 0
LOSS OF CONSCIOUSNESS: 0
EYE DISCHARGE: 0
CHILLS: 0
SHORTNESS OF BREATH: 1
TREMORS: 0
HALLUCINATIONS: 0
SEIZURES: 0
NAUSEA: 0
SHORTNESS OF BREATH: 1
PALPITATIONS: 0
SPEECH CHANGE: 0
EYE DISCHARGE: 0
BRUISES/BLEEDS EASILY: 0
VOMITING: 0
STRIDOR: 0
FEVER: 0
PALPITATIONS: 0
SORE THROAT: 0
ORTHOPNEA: 0
CHILLS: 0
DIZZINESS: 0
WEAKNESS: 0
BLURRED VISION: 0
HEADACHES: 0
NAUSEA: 0
DOUBLE VISION: 0
FEVER: 0
COUGH: 1
SHORTNESS OF BREATH: 1
CONSTIPATION: 0
TREMORS: 0
COUGH: 1
HEADACHES: 0
SENSORY CHANGE: 0
COUGH: 0
NAUSEA: 0
ABDOMINAL PAIN: 0
SPUTUM PRODUCTION: 0
COUGH: 1
NAUSEA: 0
MYALGIAS: 0
SPUTUM PRODUCTION: 0
ABDOMINAL PAIN: 0
BLOOD IN STOOL: 0
NERVOUS/ANXIOUS: 0
DIARRHEA: 0
HEADACHES: 0
SPUTUM PRODUCTION: 1
FOCAL WEAKNESS: 0
PALPITATIONS: 0
ABDOMINAL PAIN: 0
ABDOMINAL PAIN: 0
DIZZINESS: 0
DIARRHEA: 0
ABDOMINAL PAIN: 0
WEIGHT LOSS: 0
PALPITATIONS: 0
DIARRHEA: 0
WEAKNESS: 0
COUGH: 0
NAUSEA: 1
SHORTNESS OF BREATH: 1
SHORTNESS OF BREATH: 1
DIZZINESS: 0
FOCAL WEAKNESS: 0
WHEEZING: 0
NAUSEA: 1
COUGH: 1
DEPRESSION: 0
MYALGIAS: 0
DIARRHEA: 0
SPUTUM PRODUCTION: 0
COUGH: 0
SPUTUM PRODUCTION: 0
SHORTNESS OF BREATH: 1
FEVER: 0
STRIDOR: 0
DEPRESSION: 0
WEAKNESS: 1
SPUTUM PRODUCTION: 0
CHILLS: 0
NAUSEA: 0
STRIDOR: 0
SENSORY CHANGE: 0
DEPRESSION: 0
DIZZINESS: 1
ABDOMINAL PAIN: 0
CONSTIPATION: 0
WEAKNESS: 0
TREMORS: 0
CHILLS: 0
NERVOUS/ANXIOUS: 0
SPEECH CHANGE: 0
HEADACHES: 0
NERVOUS/ANXIOUS: 0
SPUTUM PRODUCTION: 0
VOMITING: 0
HEADACHES: 0
CONSTIPATION: 0
NAUSEA: 0
POLYDIPSIA: 0
DIARRHEA: 0

## 2020-01-01 ASSESSMENT — LIFESTYLE VARIABLES
TOTAL SCORE: 0
ON A TYPICAL DAY WHEN YOU DRINK ALCOHOL HOW MANY DRINKS DO YOU HAVE: 0
AVERAGE NUMBER OF DAYS PER WEEK YOU HAVE A DRINK CONTAINING ALCOHOL: 0
DOES PATIENT WANT TO STOP DRINKING: NO
EVER HAD A DRINK FIRST THING IN THE MORNING TO STEADY YOUR NERVES TO GET RID OF A HANGOVER: NO
EVER FELT BAD OR GUILTY ABOUT YOUR DRINKING: NO
ALCOHOL_USE: NO
CONSUMPTION TOTAL: INCOMPLETE
CONSUMPTION TOTAL: NEGATIVE
HAVE PEOPLE ANNOYED YOU BY CRITICIZING YOUR DRINKING: NO
HOW MANY TIMES IN THE PAST YEAR HAVE YOU HAD 5 OR MORE DRINKS IN A DAY: 0
HAVE PEOPLE ANNOYED YOU BY CRITICIZING YOUR DRINKING: NO
TOTAL SCORE: 0
EVER HAD A DRINK FIRST THING IN THE MORNING TO STEADY YOUR NERVES TO GET RID OF A HANGOVER: NO
SUBSTANCE_ABUSE: 0
TOTAL SCORE: 0
EVER_SMOKED: YES
HAVE YOU EVER FELT YOU SHOULD CUT DOWN ON YOUR DRINKING: NO
HAVE YOU EVER FELT YOU SHOULD CUT DOWN ON YOUR DRINKING: NO
DOES PATIENT WANT TO STOP DRINKING: NO
DO YOU DRINK ALCOHOL: NO
EVER FELT BAD OR GUILTY ABOUT YOUR DRINKING: NO
TOTAL SCORE: 0

## 2020-01-01 ASSESSMENT — COGNITIVE AND FUNCTIONAL STATUS - GENERAL
MOVING FROM LYING ON BACK TO SITTING ON SIDE OF FLAT BED: A LITTLE
CLIMB 3 TO 5 STEPS WITH RAILING: A LITTLE
SUGGESTED CMS G CODE MODIFIER DAILY ACTIVITY: CJ
DRESSING REGULAR LOWER BODY CLOTHING: A LITTLE
MOBILITY SCORE: 24
SUGGESTED CMS G CODE MODIFIER MOBILITY: CH
DAILY ACTIVITIY SCORE: 21
SUGGESTED CMS G CODE MODIFIER DAILY ACTIVITY: CH
SUGGESTED CMS G CODE MODIFIER MOBILITY: CK
MOVING TO AND FROM BED TO CHAIR: A LITTLE
TOILETING: A LITTLE
STANDING UP FROM CHAIR USING ARMS: A LITTLE
HELP NEEDED FOR BATHING: A LITTLE
MOBILITY SCORE: 19
DAILY ACTIVITIY SCORE: 24
WALKING IN HOSPITAL ROOM: A LITTLE

## 2020-01-01 ASSESSMENT — ACTIVITIES OF DAILY LIVING (ADL)
CONTINENCE_REQUIRES_ASSISTANCE: 1
AMBULATION_REQUIRES_ASSISTANCE: 1
DRESSING_REQUIRES_ASSISTANCE: 1
BATHING_REQUIRES_ASSISTANCE: 1
PHYSICAL_TRANSFER_REQUIRES_ASSISTANCE: 1

## 2020-01-01 ASSESSMENT — PATIENT HEALTH QUESTIONNAIRE - PHQ9
2. FEELING DOWN, DEPRESSED, IRRITABLE, OR HOPELESS: NOT AT ALL
1. LITTLE INTEREST OR PLEASURE IN DOING THINGS: NOT AT ALL
SUM OF ALL RESPONSES TO PHQ9 QUESTIONS 1 AND 2: 0

## 2020-01-01 ASSESSMENT — COPD QUESTIONNAIRES: COPD: 1

## 2020-01-16 NOTE — PROCEDURES
Multi-Ox Readings  Multi Ox #1 Room air   O2 sat % at rest 84   O2 sat % on exertion     O2 sat average on exertion     Multi Ox #2 3 LPM   O2 sat % at rest 95   O2 sat % on exertion 91   O2 sat average on exertion       Oxygen Use     Oxygen Frequency     Duration of need     Is the patient mobile within the home?     CPAP Use?     BIPAP Use?     Servo Titration

## 2020-01-16 NOTE — PROGRESS NOTES
Chief Complaint   Patient presents with   • Establish Care     referral 19/9/19 FILIPE Brock MD HOS DX SOB 11/15/19-11/21/19    • Results     Ct Chest/ abdomen 1/14/2020, Ct CTA, cxr 11/15/19        HPI: This patient is a 64 y.o. female presenting for evaluation of hypoxic respiratory failure.  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  She has since been treated with radiation and chemotherapy and is on maintenance immunotherapy.  She is followed by cancer care.  She has been hospitalized twice at [facility] and during 1 hospitalization was started on Advair 100 and Spiriva for presumed COPD.  The patient apparently had pulmonary function testing done at [facility] but I do not have these results.  She denies any clinical history of COPD although she did have significant cough and shortness of breath following her diagnosis and is on supplemental oxygen in clinic today due to a room air saturation of 84% upon arrival.  The patient is a former smoker with greater than 35-pack-year history and quit in 2012 her family history is notable for mother and father who both had cancer although it sounds as though her mother had primary breast cancer and father possibly prostate cancer exact etiologies are not known.  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  Of 19 85-19 92 she worked with sheet-metal as an  with multiple different exposures.  Her most recent CT reviewed by me from January 14 shows left lower lobe cavitary mass consistent with known malignancy as well as bilateral intralobular septal thickening with associated bronchiectasis and chronic fibrotic changes left greater than right on a background of emphysema.  The patient  presents today mainly to establish care.  She is not clear if the Advair and Spiriva have been helping her but she has been using only Advair.  She does have nebulized bronchodilators at home which she uses on an as-needed basis and does get some relief from these.  She denies hemoptysis, fevers, chills, night sweats, weight loss.  She is interested in supplemental oxygen as well as pulmonary rehab.    Past Medical History:   Diagnosis Date   • Acute respiratory failure with hypoxia (HCC) 2019   • Cancer (HCC)    • Constipation 2019   • COPD (chronic obstructive pulmonary disease) (HCC)    • Elevated brain natriuretic peptide (BNP) level 2019   • Hypokalemia 2019   • Lung cancer, primary, with metastasis from lung to other site (HCC)        Social History     Socioeconomic History   • Marital status:      Spouse name: Not on file   • Number of children: Not on file   • Years of education: Not on file   • Highest education level: Not on file   Occupational History   • Not on file   Social Needs   • Financial resource strain: Not on file   • Food insecurity:     Worry: Not on file     Inability: Not on file   • Transportation needs:     Medical: Not on file     Non-medical: Not on file   Tobacco Use   • Smoking status: Former Smoker     Packs/day: 0.50     Types: Cigarettes     Last attempt to quit: 11/15/2012     Years since quittin.1   • Smokeless tobacco: Former User   Substance and Sexual Activity   • Alcohol use: Never     Frequency: Never   • Drug use: Never   • Sexual activity: Not on file   Lifestyle   • Physical activity:     Days per week: Not on file     Minutes per session: Not on file   • Stress: Not on file   Relationships   • Social connections:     Talks on phone: Not on file     Gets together: Not on file     Attends Nondenominational service: Not on file     Active member of club or organization: Not on file     Attends meetings of clubs or organizations: Not on file      "Relationship status: Not on file   • Intimate partner violence:     Fear of current or ex partner: Not on file     Emotionally abused: Not on file     Physically abused: Not on file     Forced sexual activity: Not on file   Other Topics Concern   • Primary/coprimary nurse & associates Not Asked   • Family contact information Not Asked   • OK to release patient information to the following Not Asked   • Patient preferred routine/Privacy concerns Not Asked   • Patient likes and dislikes Not Asked   • Participating In Research Study Not Asked   • Miscellaneous Not Asked   Social History Narrative   • Not on file       Family History   Problem Relation Age of Onset   • Cancer Mother         lung   • Other Mother         Addision disease   • Cancer Father         lung   • Hypertension Father    • No Known Problems Sister    • Lung Disease Brother         emphysema       Current Outpatient Medications on File Prior to Visit   Medication Sig Dispense Refill   • oxyCODONE immediate-release (ROXICODONE) 5 MG Tab Take 5 mg by mouth every 6 hours as needed for Severe Pain.     • Multiple Vitamins-Minerals (MULTIVITAMIN PO) Take 1 Tab by mouth every day.     • Magnesium 400 MG Tab Take 400 mg by mouth every day.     • DURVALUMAB IV 1 Each by Intravenous route every 14 days.     • fluticasone-salmeterol (ADVAIR) 100-50 MCG/DOSE AEROSOL POWDER, BREATH ACTIVATED Inhale 1 Puff by mouth every 12 hours.     • tiotropium (SPIRIVA HANDIHALER) 18 MCG Cap Inhale 18 mcg by mouth every day.       No current facility-administered medications on file prior to visit.        Allergies: Patient has no known allergies.    ROS:   Review of Systems   Respiratory: Positive for cough and shortness of breath.        /80 (BP Location: Left arm, Patient Position: Sitting, BP Cuff Size: Adult)   Pulse (!) 121   Temp 36.4 °C (97.5 °F) (Temporal)   Resp 16   Ht 1.727 m (5' 8\")   Wt 90.7 kg (200 lb)   SpO2 (!) 84%     Physical Exam:  Physical " Exam  Constitutional:       General: She is not in acute distress.     Appearance: Normal appearance. She is well-developed.   HENT:      Head: Normocephalic and atraumatic.      Right Ear: External ear normal.      Left Ear: External ear normal.      Nose: Nose normal. No congestion.      Mouth/Throat:      Mouth: Mucous membranes are moist.      Pharynx: Oropharynx is clear. No oropharyngeal exudate.   Eyes:      General: No scleral icterus.     Conjunctiva/sclera: Conjunctivae normal.      Pupils: Pupils are equal, round, and reactive to light.   Neck:      Musculoskeletal: Normal range of motion and neck supple.      Vascular: No JVD.      Trachea: No tracheal deviation.   Cardiovascular:      Rate and Rhythm: Normal rate and regular rhythm.      Heart sounds: Normal heart sounds. No murmur. No friction rub. No gallop.    Pulmonary:      Effort: Pulmonary effort is normal. No accessory muscle usage or respiratory distress.      Breath sounds: No wheezing or rales.      Comments: Coarse inspiratory crackles b/l  But L>R, decreased bs b/l at apices  Abdominal:      General: There is no distension.      Palpations: Abdomen is soft.      Tenderness: There is no tenderness.   Musculoskeletal: Normal range of motion.         General: No tenderness or deformity.   Lymphadenopathy:      Cervical: No cervical adenopathy.   Skin:     General: Skin is warm and dry.      Findings: No rash.      Nails: There is no clubbing.     Neurological:      Mental Status: She is alert and oriented to person, place, and time.      Cranial Nerves: No cranial nerve deficit.      Gait: Gait normal.   Psychiatric:         Mood and Affect: Mood normal.         Behavior: Behavior normal.         PFTs as reviewed by me personally: I have done for review today    Imaging as reviewed by me personally: As per HPI    Assessment:  1. Chronic respiratory failure with hypoxia (HCC)  Multiple Oximetry    REFERRAL TO PULMONARY REHAB   2. Squamous cell  carcinoma lung, left (HCC)     3. Pulmonary fibrosis (HCC)     4. History of tobacco use     5. Pulmonary emphysema, unspecified emphysema type (HCC)         Plan:  1.  Patient hypoxic on room air in clinic today.  I suspect this is acute on chronic given her recent diagnosis of lung cancer with treatment on a background of emphysema and fibrotic lung disease.  We will start supplemental oxygen and refer to pulmonary rehab.  I will plan to see her back no later than 6 weeks.  2.  Patient status post radiation and chemotherapy, currently on immunotherapy.  Followed by cancer care specialist.  We will continue to follow along with them.  3.  This is likely chronic as she was told she had a chest x-ray sometime in 2010 with fibrotic changes.  The reported increase in interlobular thickening following her treatment is likely related to radiation.  I would like to obtain her last set of pulmonary function test and refer her to pulmonary rehab after which we can repeat pulmonary function test.  We will need to determine if this is a progressive disorder and whether or not she may be a candidate for anti-fibrotic therapy.  I suspect that this is exposure related versus IPF.  4.  Patient is tobacco free.  I encouraged ongoing abstinence.  5.  Patient with emphysematous changes on CT although no clinical history of COPD.  I okayed her to stop both the Advair and the Spiriva and use only as needed short acting bronchodilators via nebulizer.  We have referred her to pulmonary rehab.  Depending on how often she uses her nebulized bronchodilators we can determine if long-term or controller therapy is necessary.  In the meantime we will start supplemental oxygen.  She is up-to-date on vaccines.  She is tobacco free.  We will see her back in 6 weeks to reassess symptoms.  Return in about 6 weeks (around 2/27/2020) for copd, pulmonary fibrosis.

## 2020-01-20 NOTE — TELEPHONE ENCOUNTER
Patient calling she has not heard from [facility] and is trying to run down her order. I called [employer] And spoke with Henny and she states yes, our order was received by them and they are waiting for authorization from [facility] insurance.   Patient aware and will reach out to Henny at [facility] if they do not contact her in the next day or so.Patient also calling her insurance to be sure they are processing quickly as patient is waiting for new oxygen set up.

## 2020-01-24 NOTE — TELEPHONE ENCOUNTER
Unfortunately that is not how their POC system works  We can not force DMEs to provide POCs to patients.      1. Their policy is [employer] must pay the first claim before the pt is even put on [facility] for a POC - this will NOT happen by the 30th of this month (I am verifying that [employer] is approved for POCs at all before I call the pt)    2. POCs dispensed by [facility] are pulse dose only and pt will still need to facilitate concentrator delivery for travel    3. There is a charge for continuous flow POCs loaned for travel       I have made sure Anselmo is going to schedule pt for pulse dose tanks so we can get her smaller tanks in the interim - this will be completed next week

## 2020-01-24 NOTE — TELEPHONE ENCOUNTER
"PT called states [facility] did deliver oxygen pt requesting we place order to [facility] for POC must be documented \" expedite authorization for a portable concentrator.\" will need POC by the 30th scheduled for procedure and requesting to have poc for travel. Also goes to immune therapy and is difficult to take tanks. Please advise   "

## 2020-01-27 NOTE — TELEPHONE ENCOUNTER
Per Anselmo at [facility] they will submit for auth for the POC through [employer]    Pt will still not receive this week and will have to wait for both approved claims and approved auth before POC will be dispensed     Please sign attached order

## 2020-01-30 NOTE — DISCHARGE INSTRUCTIONS
ACTIVITY: Rest and take it easy for the first 24 hours.  A responsible adult is recommended to remain with you during that time.  It is normal to feel sleepy.  We encourage you to not do anything that requires balance, judgment or coordination.    MILD FLU-LIKE SYMPTOMS ARE NORMAL. YOU MAY EXPERIENCE GENERALIZED MUSCLE ACHES, THROAT IRRITATION, HEADACHE AND/OR SOME NAUSEA.    FOR 24 HOURS DO NOT:  Drive, operate machinery or run household appliances.  Drink beer or alcoholic beverages.   Make important decisions or sign legal documents.    SPECIAL INSTRUCTIONS: No shower or bath for 1 week. Sponge Bath only.  Keep surgical site clean dry and covered until fully healed. Do not lift right arm above head for 1 week, Do not lift over 10 lbs for 1 week with right arm.     DIET: To avoid nausea, slowly advance diet as tolerated, avoiding spicy or greasy foods for the first day.  Add more substantial food to your diet according to your physician's instructions.  Babies can be fed formula or breast milk as soon as they are hungry.  INCREASE FLUIDS AND FIBER TO AVOID CONSTIPATION.    SURGICAL DRESSING/BATHING: No shower or bath for 1 week. Sponge Bath only.  Keep surgical site clean dry and covered until fully healed.    FOLLOW-UP APPOINTMENT:  A follow-up appointment should be arranged with your doctor in 1 week; call to schedule.    You should CALL YOUR PHYSICIAN if you develop:  Fever greater than 101 degrees F.  Pain not relieved by medication, or persistent nausea or vomiting.  Excessive bleeding (blood soaking through dressing) or unexpected drainage from the wound.  Extreme redness or swelling around the incision site, drainage of pus or foul smelling drainage.  Inability to urinate or empty your bladder within 8 hours.  Problems with breathing or chest pain.    You should call 911 if you develop problems with breathing or chest pain.  If you are unable to contact your doctor or surgical center, you should go to the  nearest emergency room or urgent care center.     If any questions arise, call your doctor.  If your doctor is not available, please feel free to call the Surgical Center at [phone].  The Center is open Monday through Friday from 7AM to 7PM.  You can also call the HEALTH HOTLINE open 24 hours/day, 7 days/week and speak to a nurse at [phone], or toll free at [phone].    A registered nurse may call you a few days after your surgery to see how you are doing after your procedure.    MEDICATIONS: Resume taking daily medication.  Take prescribed pain medication with food.  If no medication is prescribed, you may take non-aspirin pain medication if needed.  PAIN MEDICATION CAN BE VERY CONSTIPATING.  Take a stool softener or laxative such as senokot, pericolace, or milk of magnesia if needed.      If your physician has prescribed pain medication that includes Acetaminophen (Tylenol), do not take additional Acetaminophen (Tylenol) while taking the prescribed medication.    Depression / Suicide Risk    As you are discharged from this [facility] facility, it is important to learn how to keep safe from harming yourself.    Recognize the warning signs:  · Abrupt changes in personality, positive or negative- including increase in energy   · Giving away possessions  · Change in eating patterns- significant weight changes-  positive or negative  · Change in sleeping patterns- unable to sleep or sleeping all the time   · Unwillingness or inability to communicate  · Depression  · Unusual sadness, discouragement and loneliness  · Talk of wanting to die  · Neglect of personal appearance   · Rebelliousness- reckless behavior  · Withdrawal from people/activities they love  · Confusion- inability to concentrate     If you or a loved one observes any of these behaviors or has concerns about self-harm, here's what you can do:  · Talk about it- your feelings and reasons for harming yourself  · Remove any means that you  might use to hurt yourself (examples: pills, rope, extension cords, firearm)  · Get professional help from the community (Mental Health, Substance Abuse, psychological counseling)  · Do not be alone:Call your Safe Contact- someone whom you trust who will be there for you.  · Call your local CRISIS HOTLINE 141-1501 or 597-967-6601  · Call your local Children's Mobile Crisis Response Team [place] (823) 085-9034 or www.BuddyBounce  · Call the toll free National Suicide Prevention Hotlines   · National Suicide Prevention Lifeline 373-064-APVU (1198)  · National Hope Line Network 800-SUICIDE (788-2861)      Implanted Port Home Guide  An implanted port is a type of central line that is placed under the skin. Central lines are used to provide IV access when treatment or nutrition needs to be given through a person's veins. Implanted ports are used for long-term IV access. An implanted port may be placed because:   · You need IV medicine that would be irritating to the small veins in your hands or arms.    · You need long-term IV medicines, such as antibiotics.    · You need IV nutrition for a long period.    · You need frequent blood draws for lab tests.    · You need dialysis.    Implanted ports are usually placed in the chest area, but they can also be placed in the upper arm, the abdomen, or the leg. An implanted port has two main parts:   · Grubbs. The reservoir is round and will appear as a small, raised area under your skin. The reservoir is the part where a needle is inserted to give medicines or draw blood.    · Catheter. The catheter is a thin, flexible tube that extends from the reservoir. The catheter is placed into a large vein. Medicine that is inserted into the reservoir goes into the catheter and then into the vein.    HOW WILL I CARE FOR MY INCISION SITE?  Do not get the incision site wet. Bathe or shower as directed by your health care provider.   HOW IS MY PORT ACCESSED?  Special steps must  "be taken to access the port:   · Before the port is accessed, a numbing cream can be placed on the skin. This helps numb the skin over the port site.    · Your health care provider uses a sterile technique to access the port.  ¨ Your health care provider must put on a mask and sterile gloves.  ¨ The skin over your port is cleaned carefully with an antiseptic and allowed to dry.  ¨ The port is gently pinched between sterile gloves, and a needle is inserted into the port.  · Only \"non-coring\" port needles should be used to access the port. Once the port is accessed, a blood return should be checked. This helps ensure that the port is in the vein and is not clogged.    · If your port needs to remain accessed for a constant infusion, a clear (transparent) bandage will be placed over the needle site. The bandage and needle will need to be changed every week, or as directed by your health care provider.    · Keep the bandage covering the needle clean and dry. Do not get it wet. Follow your health care provider's instructions on how to take a shower or bath while the port is accessed.    · If your port does not need to stay accessed, no bandage is needed over the port.    WHAT IS FLUSHING?  Flushing helps keep the port from getting clogged. Follow your health care provider's instructions on how and when to flush the port. Ports are usually flushed with saline solution or a medicine called heparin. The need for flushing will depend on how the port is used.   · If the port is used for intermittent medicines or blood draws, the port will need to be flushed:    ¨ After medicines have been given.    ¨ After blood has been drawn.    ¨ As part of routine maintenance.    · If a constant infusion is running, the port may not need to be flushed.    HOW LONG WILL MY PORT STAY IMPLANTED?  The port can stay in for as long as your health care provider thinks it is needed. When it is time for the port to come out, surgery will be done to " remove it. The procedure is similar to the one performed when the port was put in.   WHEN SHOULD I SEEK IMMEDIATE MEDICAL CARE?  When you have an implanted port, you should seek immediate medical care if:   · You notice a bad smell coming from the incision site.    · You have swelling, redness, or drainage at the incision site.    · You have more swelling or pain at the port site or the surrounding area.    · You have a fever that is not controlled with medicine.     This information is not intended to replace advice given to you by your health care provider. Make sure you discuss any questions you have with your health care provider.     Document Released: 12/18/2006 Document Revised: 10/08/2014 Document Reviewed: 08/25/2014  [employer] Interactive Patient Education ©2016 [employer]    Implanted Port Insertion, Care After  Refer to this sheet in the next few weeks. These instructions provide you with information on caring for yourself after your procedure. Your health care provider may also give you more specific instructions. Your treatment has been planned according to current medical practices, but problems sometimes occur. Call your health care provider if you have any problems or questions after your procedure.  WHAT TO EXPECT AFTER THE PROCEDURE  After your procedure, it is typical to have the following:   · Discomfort at the port insertion site. Ice packs to the area will help.  · Bruising on the skin over the port. This will subside in 3-4 days.  HOME CARE INSTRUCTIONS  · After your port is placed, you will get a 's information card. The card has information about your port. Keep this card with you at all times.    · Know what kind of port you have. There are many types of ports available.    · Wear a medical alert bracelet in case of an emergency. This can help alert health care workers that you have a port.    · The port can stay in for as long as your health care provider believes it is  necessary.    · A home health care nurse may give medicines and take care of the port.    · You or a family member can get special training and directions for giving medicine and taking care of the port at home.    SEEK MEDICAL CARE IF:   · Your port does not flush or you are unable to get a blood return.    · You have a fever or chills.  SEEK IMMEDIATE MEDICAL CARE IF:  · You have new fluid or pus coming from your incision.    · You notice a bad smell coming from your incision site.    · You have swelling, pain, or more redness at the incision or port site.    · You have chest pain or shortness of breath.     This information is not intended to replace advice given to you by your health care provider. Make sure you discuss any questions you have with your health care provider.     Document Released: 10/08/2014 Document Revised: 12/23/2014 Document Reviewed: 10/08/2014  [employer] Interactive Patient Education ©2016 [employer]

## 2020-01-30 NOTE — PROGRESS NOTES
OP IR RN note:    Site Marked and Confirmed with MD, patient and RN pre procedure   Tunneled port placement by MD Emery assisted by RT Gray, Right chest and IJ access site;  End tidal CO2 range 34-37 during procedure   Port placed   Bard Powerport ClearVUE isp implantable port 8 fr 19 cm REF# [id number] LOT# [id number]   Patient tolerated procedure, hemodynamically stable; pt awake and talking post procedure; see flow sheet for vitals and post op print out    patient transported back to OP IR pod 1 via IR RN monitored        No bleeding or complications noted at this time

## 2020-01-30 NOTE — OR SURGEON
Immediate Post- Operative Note    PostOp Diagnosis: VENOUS ACCESS REQUIRED FOR CHEMOTHERAPY      Procedure(s): Right  INTERNAL JUGULAR POWER PORT VENOUS ACCESS PLACEMENT WITH U/S AND FLUOROSCOPIC GUIDANCE    Estimated Blood Loss: <5CC    Complications: NONE          1/30/2020     10:51 AM     Sebastian Emery M.D.

## 2020-01-30 NOTE — PROGRESS NOTES
Discharge instructions reviewed with patient and family, all questions answered. IV removed and belongings returned.  Patient ambulated out in a stable condition.         ACTIVITY: Rest and take it easy for the first 24 hours.  A responsible adult is recommended to remain with you during that time.  It is normal to feel sleepy.  We encourage you to not do anything that requires balance, judgment or coordination.    MILD FLU-LIKE SYMPTOMS ARE NORMAL. YOU MAY EXPERIENCE GENERALIZED MUSCLE ACHES, THROAT IRRITATION, HEADACHE AND/OR SOME NAUSEA.    FOR 24 HOURS DO NOT:  Drive, operate machinery or run household appliances.  Drink beer or alcoholic beverages.   Make important decisions or sign legal documents.    SPECIAL INSTRUCTIONS: No shower or bath for 1 week. Sponge Bath only.  Keep surgical site clean dry and covered until fully healed. Do not lift right arm above head for 1 week, Do not lift over 10 lbs for 1 week with right arm.     DIET: To avoid nausea, slowly advance diet as tolerated, avoiding spicy or greasy foods for the first day.  Add more substantial food to your diet according to your physician's instructions.  Babies can be fed formula or breast milk as soon as they are hungry.  INCREASE FLUIDS AND FIBER TO AVOID CONSTIPATION.    SURGICAL DRESSING/BATHING: No shower or bath for 1 week. Sponge Bath only.  Keep surgical site clean dry and covered until fully healed.    FOLLOW-UP APPOINTMENT:  A follow-up appointment should be arranged with your doctor in 1 week; call to schedule.    You should CALL YOUR PHYSICIAN if you develop:  Fever greater than 101 degrees F.  Pain not relieved by medication, or persistent nausea or vomiting.  Excessive bleeding (blood soaking through dressing) or unexpected drainage from the wound.  Extreme redness or swelling around the incision site, drainage of pus or foul smelling drainage.  Inability to urinate or empty your bladder within 8 hours.  Problems with breathing or  chest pain.    You should call 911 if you develop problems with breathing or chest pain.  If you are unable to contact your doctor or surgical center, you should go to the nearest emergency room or urgent care center.     If any questions arise, call your doctor.  If your doctor is not available, please feel free to call the Surgical Center at [phone].  The Center is open Monday through Friday from 7AM to 7PM.  You can also call the HEALTH HOTLINE open 24 hours/day, 7 days/week and speak to a nurse at [phone], or toll free at [phone].    A registered nurse may call you a few days after your surgery to see how you are doing after your procedure.    MEDICATIONS: Resume taking daily medication.  Take prescribed pain medication with food.  If no medication is prescribed, you may take non-aspirin pain medication if needed.  PAIN MEDICATION CAN BE VERY CONSTIPATING.  Take a stool softener or laxative such as senokot, pericolace, or milk of magnesia if needed.      If your physician has prescribed pain medication that includes Acetaminophen (Tylenol), do not take additional Acetaminophen (Tylenol) while taking the prescribed medication.    Depression / Suicide Risk    As you are discharged from this [facility] facility, it is important to learn how to keep safe from harming yourself.    Recognize the warning signs:  · Abrupt changes in personality, positive or negative- including increase in energy   · Giving away possessions  · Change in eating patterns- significant weight changes-  positive or negative  · Change in sleeping patterns- unable to sleep or sleeping all the time   · Unwillingness or inability to communicate  · Depression  · Unusual sadness, discouragement and loneliness  · Talk of wanting to die  · Neglect of personal appearance   · Rebelliousness- reckless behavior  · Withdrawal from people/activities they love  · Confusion- inability to concentrate     If you or a loved one observes  old records/vital signs any of these behaviors or has concerns about self-harm, here's what you can do:  · Talk about it- your feelings and reasons for harming yourself  · Remove any means that you might use to hurt yourself (examples: pills, rope, extension cords, firearm)  · Get professional help from the community (Mental Health, Substance Abuse, psychological counseling)  · Do not be alone:Call your Safe Contact- someone whom you trust who will be there for you.  · Call your local CRISIS HOTLINE 802-0278 or 037-177-9660  · Call your local Children's Mobile Crisis Response Team [place] (698) 093-3058 or www.[employer]  · Call the toll free National Suicide Prevention Hotlines   · National Suicide Prevention Lifeline 992-641-VDTO (5139)  · National Hope Line Network 800-SUICIDE (289-0447)      Implanted Port Home Guide  An implanted port is a type of central line that is placed under the skin. Central lines are used to provide IV access when treatment or nutrition needs to be given through a person's veins. Implanted ports are used for long-term IV access. An implanted port may be placed because:   · You need IV medicine that would be irritating to the small veins in your hands or arms.    · You need long-term IV medicines, such as antibiotics.    · You need IV nutrition for a long period.    · You need frequent blood draws for lab tests.    · You need dialysis.    Implanted ports are usually placed in the chest area, but they can also be placed in the upper arm, the abdomen, or the leg. An implanted port has two main parts:   · [place]. The reservoir is round and will appear as a small, raised area under your skin. The reservoir is the part where a needle is inserted to give medicines or draw blood.    · Catheter. The catheter is a thin, flexible tube that extends from the reservoir. The catheter is placed into a large vein. Medicine that is inserted into the reservoir goes into the catheter and then into the vein.    HOW  "WILL I CARE FOR MY INCISION SITE?  Do not get the incision site wet. Bathe or shower as directed by your health care provider.   HOW IS MY PORT ACCESSED?  Special steps must be taken to access the port:   · Before the port is accessed, a numbing cream can be placed on the skin. This helps numb the skin over the port site.    · Your health care provider uses a sterile technique to access the port.  ¨ Your health care provider must put on a mask and sterile gloves.  ¨ The skin over your port is cleaned carefully with an antiseptic and allowed to dry.  ¨ The port is gently pinched between sterile gloves, and a needle is inserted into the port.  · Only \"non-coring\" port needles should be used to access the port. Once the port is accessed, a blood return should be checked. This helps ensure that the port is in the vein and is not clogged.    · If your port needs to remain accessed for a constant infusion, a clear (transparent) bandage will be placed over the needle site. The bandage and needle will need to be changed every week, or as directed by your health care provider.    · Keep the bandage covering the needle clean and dry. Do not get it wet. Follow your health care provider's instructions on how to take a shower or bath while the port is accessed.    · If your port does not need to stay accessed, no bandage is needed over the port.    WHAT IS FLUSHING?  Flushing helps keep the port from getting clogged. Follow your health care provider's instructions on how and when to flush the port. Ports are usually flushed with saline solution or a medicine called heparin. The need for flushing will depend on how the port is used.   · If the port is used for intermittent medicines or blood draws, the port will need to be flushed:    ¨ After medicines have been given.    ¨ After blood has been drawn.    ¨ As part of routine maintenance.    · If a constant infusion is running, the port may not need to be flushed.    HOW LONG WILL " MY PORT STAY IMPLANTED?  The port can stay in for as long as your health care provider thinks it is needed. When it is time for the port to come out, surgery will be done to remove it. The procedure is similar to the one performed when the port was put in.   WHEN SHOULD I SEEK IMMEDIATE MEDICAL CARE?  When you have an implanted port, you should seek immediate medical care if:   · You notice a bad smell coming from the incision site.    · You have swelling, redness, or drainage at the incision site.    · You have more swelling or pain at the port site or the surrounding area.    · You have a fever that is not controlled with medicine.     This information is not intended to replace advice given to you by your health care provider. Make sure you discuss any questions you have with your health care provider.     Document Released: 12/18/2006 Document Revised: 10/08/2014 Document Reviewed: 08/25/2014  [employer] Interactive Patient Education ©2016 [employer]    Implanted Port Insertion, Care After  Refer to this sheet in the next few weeks. These instructions provide you with information on caring for yourself after your procedure. Your health care provider may also give you more specific instructions. Your treatment has been planned according to current medical practices, but problems sometimes occur. Call your health care provider if you have any problems or questions after your procedure.  WHAT TO EXPECT AFTER THE PROCEDURE  After your procedure, it is typical to have the following:   · Discomfort at the port insertion site. Ice packs to the area will help.  · Bruising on the skin over the port. This will subside in 3-4 days.  HOME CARE INSTRUCTIONS  · After your port is placed, you will get a 's information card. The card has information about your port. Keep this card with you at all times.    · Know what kind of port you have. There are many types of ports available.    · Wear a medical alert bracelet  in case of an emergency. This can help alert health care workers that you have a port.    · The port can stay in for as long as your health care provider believes it is necessary.    · A home health care nurse may give medicines and take care of the port.    · You or a family member can get special training and directions for giving medicine and taking care of the port at home.    SEEK MEDICAL CARE IF:   · Your port does not flush or you are unable to get a blood return.    · You have a fever or chills.  SEEK IMMEDIATE MEDICAL CARE IF:  · You have new fluid or pus coming from your incision.    · You notice a bad smell coming from your incision site.    · You have swelling, pain, or more redness at the incision or port site.    · You have chest pain or shortness of breath.     This information is not intended to replace advice given to you by your health care provider. Make sure you discuss any questions you have with your health care provider.     Document Released: 10/08/2014 Document Revised: 12/23/2014 Document Reviewed: 10/08/2014  Elsevier Interactive Patient Education ©2016 [employer]

## 2020-02-15 NOTE — ED PROVIDER NOTES
ED Provider Note    Chief Complaint:   Shortness of breath    HPI:  Michelle Billingsley is a 64 y.o. female who presents with chief complaint of shortness of breath and increased oxygen requirement.  She has a history of lung disease, as well as a history of lung thickening due to immunotherapy.  2 to 3 weeks ago, she was started on supplemental home oxygen.  She reports requiring between 2 and 3 L supplemental oxygen at baseline.  Over the past 2 to 3 weeks she has had progressively worsening shortness of breath.  She has required as much as 6 L to keep her oxygen saturations in the low 90s, states that at times she would find her saturation dropping to the 70s or 60s, prompting her to come to the emergency department today.  Over the course of the evening last night, she felt short of breath, but says she was able to keep her oxygen saturations around 92.  She denies any associated chest pain, denies any recent fevers.  She denies any leg pain or leg swelling out of the ordinary.  She is had no associated nausea or vomiting.  She is currently taking prednisone for fibrotic lung disease as documented above, she is also stopped her immunotherapy for the time being.  She is not on traditional chemotherapy at this time.  Denies any unilateral leg swelling.  She does have a history of fluid overload in the past.  Denies any associated abdominal swelling or abdominal distention.    She did have inhalers prescribed to her at one point, she states that the inhalers do not seem to improve her symptoms at all.    Patient is followed by Dr. José, oncologist.    Review of Systems:  See HPI for pertinent positives and negatives. All other systems negative.    Past Medical History:   has a past medical history of Acute respiratory failure with hypoxia (HCC) (11/16/2019), Breath shortness, Cancer (HCC), Constipation (11/20/2019), COPD (chronic obstructive pulmonary disease) ([facility]), Elevated brain natriuretic peptide (BNP) level  (2019), Emphysema of lung (HCC), Hiatus hernia syndrome, Hypokalemia (2019), Jaundice, and Lung cancer, primary, with metastasis from lung to other site (HCC).    Social History:  Social History     Tobacco Use   • Smoking status: Former Smoker     Packs/day: 1.00     Years: 38.00     Pack years: 38.00     Types: Cigarettes     Last attempt to quit: 11/15/2012     Years since quittin.2   • Smokeless tobacco: Never Used   Substance and Sexual Activity   • Alcohol use: Not Currently     Frequency: Never   • Drug use: Not Currently   • Sexual activity: Not on file       Surgical History:   has a past surgical history that includes primary c section; bunionectomy; and hand surgery (Right, ).    Current Medications:  Home Medications    **Home medications have not yet been reviewed for this encounter**         Allergies:  No Known Allergies    Physical Exam:  Vital Signs: /61   Pulse 85   Temp 36.8 °C (98.2 °F) (Temporal)   Resp (!) 21   Wt 90.7 kg (200 lb)   SpO2 95%   BMI 30.41 kg/m²   Constitutional: Alert, no acute distress  HENT: Moist mucus membranes, normal posterior pharynx, no intraoral lesions  Eyes: Pupils equal and reactive, normal conjunctiva  Neck: Supple, normal range of motion, no stridor  Cardiovascular: Extremities are warm and well perfused, no murmur appreciated, normal cardiac auscultation  Pulmonary: No respiratory distress, normal work of breathing, no accessory muscule usage, breath sounds quiet bilaterally  Abdomen: Soft, non-distended, non-tender to palpation, no peritoneal signs  Skin: Warm, dry, no rashes or lesions  Musculoskeletal: Normal range of motion in all extremities, no swelling or deformity noted  Neurologic: Alert, oriented, normal speech, normal motor function  Psychiatric: Normal and appropriate mood and affect    Medical records reviewed for continuity of care.  Discharge summary reviewed from 2019.  Patient presented with fatigue and  "shortness of breath.  At that time, she was on immunotherapy for lung cancer, sent from oncology when found to be hypoxic.  She had no evidence of infection.  She had no evidence of heart failure, thought to be hypoxic due to fluid overload.  She was discharged home in stable condition after diuresis.    EKG: Rate 122, sinus tachycardia, no ST elevation, multiple PVCs noted, artifact present.    Labs:  Labs Reviewed   CBC WITH DIFFERENTIAL - Abnormal; Notable for the following components:       Result Value    WBC 17.1 (*)     RBC 3.63 (*)     Hemoglobin 11.4 (*)     Hematocrit 34.6 (*)     MCHC 32.9 (*)     RDW 54.0 (*)     Neutrophils-Polys 92.20 (*)     Lymphocytes 3.60 (*)     Neutrophils (Absolute) 15.77 (*)     Lymphs (Absolute) 0.62 (*)     All other components within normal limits   COMP METABOLIC PANEL - Abnormal; Notable for the following components:    Potassium 3.5 (*)     Glucose 129 (*)     All other components within normal limits   LACTIC ACID   LACTIC ACID    Narrative:     Repeat if initial lactic acid result is greater than 2   BLOOD CULTURE    Narrative:     Per Hospital Policy: Only change Specimen Src: to \"Line\" if  specified by physician order.   BLOOD CULTURE    Narrative:     Per Hospital Policy: Only change Specimen Src: to \"Line\" if  specified by physician order.   PROBRAIN NATRIURETIC PEPTIDE, NT   INFLUENZA A/B BY PCR    Narrative:     Indication for culture:->Septic Shock: Persistent  hypotension,  Lactic acid > 4, vasopressors/inotropes started   ESTIMATED GFR   URINALYSIS   URINE CULTURE(NEW)       Radiology:  DX-CHEST-PORTABLE (1 VIEW)   Final Result      Significant interval worsening left perihilar/lingular consolidation with possible underlying Mass.      Significant worsening diffuse reticular abnormal opacity is consistent with underlying interstitial lung disease. Superimposed atypical infection or edema is possible      New left lung volume loss           ED Medications " Administered:  Medications - No data to display    Differential diagnosis:  Fluid overload, worsening pulmonary malignancy, pleural effusion, pneumonia    MDM:  Patient presents with progressively worsening shortness of breath over the past 2 to 3 weeks, significantly worse over the past 2 to 3 days.  On arrival to the emergency department, room air oxygen saturation is in the high 80s on 2 to 3 L nasal cannula.    On laboratory evaluation White blood count is elevated to 17.1, this may be due to her recently initiated prednisone regimen.  Hemoglobin is 11.4, less concerning for symptomatic anemia.  Lactic acid is within normal limits, less concerning for sepsis.  CMP without any significant abnormalities.    Plain film of the chest demonstrates interval worsening of left perihilar/lingular consolidation with possible underlying mass.  Significantly worse diffuse reticular abnormal opacity consistent with underlying interstitial lung disease.  Possible layering left pleural fluid noted.    I did review the imaging from her most recent CT chest, abdomen, and pelvis, this was done 1/14/2020.  Notes interstitial changes progressing in the left upper lobe with associated volume loss.  Cavitary mass in the left lower lobe had decreased in size.  Fluid noted along the left major fissure.  Small left pleural effusion noted.  Adenopathy improved from prior exam.    At this time, unclear if the etiology of the patient's shortness of breath is due to worsening interstitial lung disease, or worsening malignancy.  Plan at this time is for admission for continued respiratory support including supplemental oxygen.  Additionally, she may require further imaging to determine cause of her hypoxia and worsening lung disease.    Disposition:  Admit to hospitalist in guarded condition    Final Impression:  1. Hypoxia    2. Malignant neoplasm of lung, unspecified laterality, unspecified part of lung (HCC)    3. SOB (shortness of breath)         Electronically signed by: Mady Mg MD, 2/15/2020 2:41 PM

## 2020-02-15 NOTE — ED TRIAGE NOTES
Chief Complaint   Patient presents with   • Shortness of Breath     x2days     Pt wheeled to triage with above complaints. Pt has history of lung cancer. Worsening of sob past 2days, she uses O2 home and has been increasing . Now 6L , 90-92%. Denies fever.sepsis score=3

## 2020-02-15 NOTE — ED NOTES
Med rec complete per pt at bedside   NKDA  No oral ABX within the last 14 days   Pt is on day 22 of Prednisone taper (started on 1/24/2020)

## 2020-02-16 PROBLEM — J96.21 ACUTE ON CHRONIC RESPIRATORY FAILURE WITH HYPOXIA (HCC): Status: ACTIVE | Noted: 2019-01-01

## 2020-02-16 PROBLEM — J18.9 PNEUMONIA: Status: ACTIVE | Noted: 2020-01-01

## 2020-02-16 PROBLEM — J84.10 PULMONARY FIBROSIS (HCC): Status: ACTIVE | Noted: 2020-01-01

## 2020-02-16 NOTE — PROGRESS NOTES
Received report from ANSHUL Ferrera. Assumed care of patient at 0700. Patient A&Ox 4, speaking in full sentences, follows commands and responds appropriately to questions. On 8L NC, signs of respiratory distress with exertion. Respirations are even and minimally labored. Patient states 0/10 pain at this time. POC discussed and agreed upon with patient. Call light and belongings within reach. Bed in lowest locked position. Upper side rails raised.  Fall risk precautions in place. Hourly rounding. Will continue to monitor.

## 2020-02-16 NOTE — PROGRESS NOTES
1845: Pts dinner tray arrived. Patient placed back on nasal cannula at 6L so that patient can eat. Pts O2 sats at 89% on 6L NC. Pt instructed to call RN if O2 sats drop further while eating.     1900: Rn informed by another RN and patient that her O2 sats had dropped to the low 80s while eating. The assisting RN had placed the patient back on the oxymask. When switching from the oxymask to the nasal cannula the patients O2 sat went down to the high 60's. The patient was placed on 8L oxymask and the patients O2 went back to the low 90's. Pt is complaining of headache at this time.

## 2020-02-16 NOTE — PROGRESS NOTES
Dr. Sykes paged for OK to use port as IV access. Port is accessed, dressing CDI. This RN also clarified whether we were transitioning to high-flow. Pt has been saturating in the low 90s on 6L oxymask since arrival. RT saw Pt and gave breathing treatment. Pt maintained saturations. Order to continue to monitor with oxymask.

## 2020-02-16 NOTE — PROGRESS NOTES
2 RN skin check complete.   Devices in place tele box and oxymask.  Skin assessed under devices yes, CDI.  Confirmed pressure ulcers found on n/a.  New potential pressure ulcers noted on n/a. Wound consult placed n/a.  The following interventions in place pillows, extra blankets for comfort. Gray foams for oxygen tubing.

## 2020-02-16 NOTE — RESPIRATORY CARE
Pt with increasing 02 requirements over shift.   Noted to desaturate into mid 60% while working with IS.   IS 2250 but pt says is reduced for her.   Currently on 10L 02 via oxymask with Sp02 87%    Pt agreeable to try HHFO, per protocol.

## 2020-02-16 NOTE — H&P
Hospital Medicine History & Physical Note    Date of Service  2/15/2020    Primary Care Physician  Yasmani Gruber M.D.    Consultants  None    Code Status  Full code    Chief Complaint  Shortness of breath    History of Presenting Illness  64 y.o. female with history of lung cancer, COPD, emphysema chronic hypoxia on 2 to 3 L at baseline, who presented 2/15/2020 with worsening of shortness of breath and hypoxia.  Apparently the patient was receiving immunotherapy, but about 3 weeks ago her respiratory status started to deteriorate.  Patient started using oxygen 24/7.  She was consulted by pulmonary Dr. Longo and it was thought that patient developed pulmonary fibrosis secondary to immunotherapy and/or recent radiation treatment.  Immunotherapy was placed on hold.  Currently she is on steroid taper, on 20 mg prednisone.  She presented today with worsening of shortness of breath in the last couple days.  She required as much as 6 L to keep her oxygen saturation in 90s.  It appears that patient oxygen requirement fluctuates in wide range.  She states that she has a port for chemotherapy placed 1 week ago.  Apparently her oncologist Dr Callahan is considering to repeat CT scan of the chest by the end of this month to evaluate cancer progression  Patient has a rare mostly nonproductive cough, chest discomfort with cough.  She denies chills or fever, lower extremity edema, dizziness, congestion.      Review of Systems  Review of Systems   Constitutional: Negative for chills, fever and weight loss.   HENT: Negative for ear pain, hearing loss and tinnitus.    Eyes: Negative for blurred vision, double vision and photophobia.   Respiratory: Negative for cough, hemoptysis and sputum production.    Cardiovascular: Negative for chest pain, palpitations and orthopnea.   Gastrointestinal: Negative for heartburn, nausea and vomiting.   Genitourinary: Negative for dysuria, flank pain, frequency and hematuria.   Musculoskeletal:  Negative for back pain, joint pain and neck pain.   Skin: Negative for itching and rash.   Neurological: Negative for tremors, speech change, focal weakness and headaches.   Endo/Heme/Allergies: Negative for environmental allergies and polydipsia. Does not bruise/bleed easily.   Psychiatric/Behavioral: Negative for hallucinations and substance abuse. The patient is not nervous/anxious.        Past Medical History   has a past medical history of Acute respiratory failure with hypoxia ([facility]) (2019), Breath shortness, Cancer (HCC), Constipation (2019), COPD (chronic obstructive pulmonary disease) (HCC), Elevated brain natriuretic peptide (BNP) level (2019), Emphysema of lung (HCC), Hiatus hernia syndrome, Hypokalemia (2019), Jaundice, and Lung cancer, primary, with metastasis from lung to other site (HCC).    Surgical History   has a past surgical history that includes primary c section; bunionectomy; and hand surgery (Right, ).     Family History   Cancer in her father and mother; Hypertension in her father; Lung Disease in her brother; No Known Problems in her sister; Other in her mother.     Social History  Patient denies smoking cigarettes reports that she quit smoking about 7 years ago. Her smoking use included cigarettes. She has a 38.00 pack-year smoking history. She has never used smokeless tobacco. She reports previous alcohol use. She reports previous drug use.    Allergies  No Known Allergies    Medications  Prior to Admission Medications   Prescriptions Last Dose Informant Patient Reported? Taking?   Ascorbic Acid (VITAMIN C PO) 2020 at AM Patient Yes No   Sig: Take 1,000 Units by mouth every day.   Biotin 5000 MCG Tab 2020 at AM Patient Yes No   Sig: Take 1 Dose by mouth every day.   Cholecalciferol (VITAMIN D PO) 2020 at AM Patient Yes No   Sig: Take 5,000 Units by mouth every day.   DURVALUMAB IV 2020 at HOLD Patient Yes No   Si Each by Intravenous route  every 14 days.   HYDROcodone-acetaminophen (NORCO) 5-325 MG Tab per tablet 2/14/2020 at PRN Patient Yes Yes   Sig: Take 1-2 Tabs by mouth every 8 hours as needed.   Magnesium 400 MG Tab 2/14/2020 at AM Patient Yes No   Sig: Take 800 mg by mouth every day.   Multiple Vitamins-Minerals (MULTIVITAMIN PO) 2/14/2020 at AM Patient Yes No   Sig: Take 1 Tab by mouth every day.   Probiotic Product (PROBIOTIC ADVANCED PO) 2/14/2020 at AM Patient Yes No   Sig: Take 1 Dose by mouth every day.   TURMERIC PO 2/14/2020 at AM Patient Yes No   Sig: Take 1,500 mg by mouth every day.   predniSONE (DELTASONE) 20 MG Tab 2/15/2020 at AM Patient Yes Yes   Sig: Take 20 mg by mouth every day. 80mg X 5 days   60mg X 5 days   40mg X 7 days   20mg X 7 days  10mg X 7 days      Facility-Administered Medications: None       Physical Exam  Temp:  [36.8 °C (98.2 °F)] 36.8 °C (98.2 °F)  Pulse:  [] 113  Resp:  [17-25] 22  BP: ()/(32-80) 134/32  SpO2:  [90 %-96 %] 95 %    Physical Exam  Vitals signs and nursing note reviewed.   Constitutional:       General: She is not in acute distress.     Appearance: Normal appearance. She is ill-appearing.   HENT:      Head: Normocephalic and atraumatic.      Nose: Nose normal.      Mouth/Throat:      Mouth: Mucous membranes are moist.   Eyes:      Extraocular Movements: Extraocular movements intact.      Pupils: Pupils are equal, round, and reactive to light.   Neck:      Musculoskeletal: Normal range of motion and neck supple.   Cardiovascular:      Rate and Rhythm: Normal rate and regular rhythm.   Pulmonary:      Effort: Pulmonary effort is normal.      Breath sounds: Decreased breath sounds and rhonchi present.   Abdominal:      General: Abdomen is flat. There is no distension.      Tenderness: There is no abdominal tenderness.   Musculoskeletal: Normal range of motion.         General: No swelling or deformity.   Skin:     General: Skin is warm and dry.   Neurological:      General: No focal  deficit present.      Mental Status: She is alert and oriented to person, place, and time.   Psychiatric:         Mood and Affect: Mood normal.         Behavior: Behavior normal.         Laboratory:  Recent Labs     02/15/20  1155   WBC 17.1*   RBC 3.63*   HEMOGLOBIN 11.4*   HEMATOCRIT 34.6*   MCV 95.3   MCH 31.4   MCHC 32.9*   RDW 54.0*   PLATELETCT 198   MPV 9.6     Recent Labs     02/15/20  1155   SODIUM 138   POTASSIUM 3.5*   CHLORIDE 101   CO2 26   GLUCOSE 129*   BUN 19   CREATININE 0.56   CALCIUM 9.1     Recent Labs     02/15/20  1155   ALTSGPT 19   ASTSGOT 20   ALKPHOSPHAT 57   TBILIRUBIN 0.5   GLUCOSE 129*         Recent Labs     02/15/20  1245   NTPROBNP 24         No results for input(s): TROPONINT in the last 72 hours.    Urinalysis:    No results found     Imaging:  CT-CTA CHEST PULMONARY ARTERY W/ RECONS   Final Result      1.  No evidence of pulmonary embolism.   2.  Increased extensive multifocal airspace groundglass and interstitial opacities. Findings could be consistent with edema and/or infection.   3.  Underlying emphysema and interstitial lung disease.   4.  Redemonstrated stellate area of masslike scarring in the left lower lobe measuring 2.6 x 3.1 cm, essentially unchanged from the previous exam and consistent with known malignancy.   5.  Small to moderate sized hiatal hernia.   6.  Hepatic steatosis.            DX-CHEST-PORTABLE (1 VIEW)   Final Result      Significant interval worsening left perihilar/lingular consolidation with possible underlying Mass.      Significant worsening diffuse reticular abnormal opacity is consistent with underlying interstitial lung disease. Superimposed atypical infection or edema is possible      New left lung volume loss            Assessment/Plan:  I anticipate this patient will require at least two midnights for appropriate medical management, necessitating inpatient admission.    Lung cancer, primary, with metastasis from lung to other site (HCC)- (present  on admission)  Assessment & Plan  Pulmonary mass has not changed on CT  Follow-up with oncologist for chemotherapy as appropriate    Acute on chronic respiratory failure with hypoxia ([facility])  Assessment & Plan  Patient has been diagnosed with a left lower lobe lung cancer, COPD, pulmonary fibrosis, pulmonary hypertension  She presented with progressive respiratory failure  CT of pulmonary artery negative for PE.  Extensive bilateral multifocal and interstitial groundglass opacities consistent with edema and/or infection.  Underlying emphysema and interstitial lung disease.   Redemonstrated stellate area of masslike scarring in the left lower lobe measuring 2.6 x 3.1 cm, essentially unchanged from the previous exam and consistent with known malignancy.  We will increase dose of steroids to IV Solu-Medrol 125 every 6 hours  We will treat with antibiotic for possible pulmonary infection  If no improvement, consider repeat transthoracic echo to assess pulmonary artery blood pressure  RT protocol, DuoNeb as needed  Wean down oxygen as tolerated    Pneumonia  Assessment & Plan  -started empirically on IV antibiotics  -RT treatment, bronchodilators  -Follow blood and sputum cultures    Pulmonary fibrosis ([facility])  Assessment & Plan  Will increase dose of steroids due to worsening of hypoxia  Consider pulmonary consultation    COPD (chronic obstructive pulmonary disease) ([facility])- (present on admission)  Assessment & Plan  Patient has no wheezes on exam  RT protocol  DuoNeb as needed  Solu-Medrol for pulmonary fibrosis      VTE prophylaxis: Heparin

## 2020-02-16 NOTE — ASSESSMENT & PLAN NOTE
Intubated 2/17 for diagnostic workup -> extubated 2/18    Discussed with Dr Cate hernandez 2/17, Dr Longo 2/18  Progression of diffuse airspace opacities with chronic fibrotic changes in the background of emphysema   Found to have PJP pneumonia  Linezolid, Cefepimine d/c 2/21    Patient would not want trach or chronic ventilator dependency   Solumedrol 1gr IV x3 days 2/17-2/19 -> changed to PJP prednisone equivalent   Continue Flolan  Ongoing titration of BiPAP

## 2020-02-16 NOTE — PROGRESS NOTES
Pt transported to [facility] with this RN and RRN on cardiac monitoring. RT at bedside, pt placed on NRB for transport, saturating high 90's.    Pt oriented to unit, call light and belongings within reach. Pt called daughter to update regarding transport. AOx4. Placed on HFNC 60L/60%

## 2020-02-16 NOTE — PROGRESS NOTES
CT paged to update that port can be used for access for contrast. Consent for contrast signed and in chart.

## 2020-02-16 NOTE — CARE PLAN
Problem: Communication  Goal: The ability to communicate needs accurately and effectively will improve  Outcome: PROGRESSING AS EXPECTED  Note: Pt actively participates in POC. Pt and board updated, all questions and concerns answered.       Problem: Safety  Goal: Will remain free from injury  Outcome: PROGRESSING AS EXPECTED  Note: Safety and fall education given. All fall precautions are in place with belongings at bedside table. Needs are tended to. Educated to use call light for assistance. Pt verbalized understanding.       Problem: Venous Thromboembolism (VTW)/Deep Vein Thrombosis (DVT) Prevention:  Goal: Patient will participate in Venous Thrombosis (VTE)/Deep Vein Thrombosis (DVT)Prevention Measures  Outcome: PROGRESSING AS EXPECTED  Flowsheets (Taken 2/16/2020 0230)  Pharmacologic Prophylaxis Used: LMWH: Enoxaparin(Lovenox)     Problem: Bowel/Gastric:  Goal: Normal bowel function is maintained or improved  Outcome: PROGRESSING AS EXPECTED     Problem: Knowledge Deficit  Goal: Knowledge of disease process/condition, treatment plan, diagnostic tests, and medications will improve  Outcome: PROGRESSING AS EXPECTED     Problem: Discharge Barriers/Planning  Goal: Patient's continuum of care needs will be met  Outcome: PROGRESSING AS EXPECTED     Problem: Respiratory:  Goal: Respiratory status will improve  Outcome: PROGRESSING AS EXPECTED  Note: Pt was higher level of care transferred for high flow however patient has not required increase in oxygen since arrival. Connected to . Saturating in the low to mid 90s at 6L oxymask.

## 2020-02-16 NOTE — PROGRESS NOTES
Pt brought to the floor with transport from ER @ 1800. Pt able to ambulate to bed independently. Pt up to restroom with 4L NC. When patient returned to bed pts O2 was in the low 70's. Pts O2 was turned up to 6L. Pts O2 would only go up to 85%. A rapid response was called and the patient was placed on an oxy mask at 15L. Pts O2 went up to 97% on the 15L. RT and ICU RNs arrived. RT gave pt breating treatment and was able to titrate pt down to 8L oxy mask. Admitting MD notified of patient condition. Pt is to be bed rest at this time. RN instructed to notify MD if patients oxygen needs continue to increase.

## 2020-02-16 NOTE — PROGRESS NOTES
Pt transferred to floor by ANSHUL Graham. Pt A&O4, VSS, on 8L oxymask, no complaints of pain. Pt updated on POC, all questions answered. She states she does not feel as bad as they say she is. Tele box on and monitor room notified. Pt oriented to room and educated to use call light for assistance. Will continue to monitor.

## 2020-02-16 NOTE — PROGRESS NOTES
Pt requiring increased O2 demands throughout the day. Pt put on high flow NC. Continuing to desat with any exertion. RT and treatment team consulted and they decided ICU with close pulmonary following was most appropriate for pts condition. Pt transferred to tele 6.

## 2020-02-16 NOTE — RESPIRATORY CARE
Pt started on HHFO and titrated to 40L70%. Pt did not tolerate higher flow at this time, but heated humidification still warming up.     Pt remains tachycardic 1teens and tachypneic low 20's. Pt states she feels a bit anxious, but can manage it.   Fine insp crackles auscultated throughout.     DuoNeb q4  IS 2250.    Will continue to work with RN in pt care. RN has contacted MD for continued medical direction.

## 2020-02-16 NOTE — ASSESSMENT & PLAN NOTE
Patient has no wheezes on exam  RT protocol  DuoNeb as needed  Solu-Medrol for pulmonary fibrosis  2/16. occ wheezes  Continue steroids

## 2020-02-16 NOTE — ASSESSMENT & PLAN NOTE
Patient has been diagnosed with a left lower lobe lung cancer, COPD, pulmonary fibrosis, pulmonary hypertension  She presented with progressive respiratory failure  CT of pulmonary artery negative for PE.  Extensive bilateral multifocal and interstitial groundglass opacities consistent with edema and/or infection.  Underlying emphysema and interstitial lung disease.   Redemonstrated stellate area of masslike scarring in the left lower lobe measuring 2.6 x 3.1 cm, essentially unchanged from the previous exam and consistent with known malignancy.  We will increase dose of steroids to IV Solu-Medrol 125 every 6 hours  We will treat with antibiotic for possible pulmonary infection  If no improvement, consider repeat transthoracic echo to assess pulmonary artery blood pressure  RT protocol, DuoNeb as needed  Wean down oxygen as tolerated  2/16. Pulmonology consulted but patient will go to ICU anyway so will be seen by intensivist.  Address underlying problems bleow.

## 2020-02-16 NOTE — PROGRESS NOTES
[facility] Medicine Daily Progress Note    Date of Service  2/16/2020    Chief Complaint  64 y.o. female admitted 2/15/2020 with Shortness of Breath (x2days)        Hospital Course    History of COPD on 2LO2  History of lung cancer, on therapy followed by Dr. Callahan  Recent diagnosis of pulmonary fibrosis thought to be due to immunotherapy  Presented with Shortness of Breath (x2days)  CT Chest showed:  1.  No evidence of pulmonary embolism.  2.  Increased extensive multifocal airspace groundglass and interstitial opacities. Findings could be consistent with edema and/or infection.  3.  Underlying emphysema and interstitial lung disease.  4.  Redemonstrated stellate area of masslike scarring in the left lower lobe measuring 2.6 x 3.1 cm, essentially unchanged from the previous exam and consistent with known malignancy.  5.  Small to moderate sized hiatal hernia.  6.  Hepatic steatosis.  Leukocytosis          Interval Problem Update  2/16. High O2 need but mentating well.     Consultants/Specialty  Pulmonology/Intensivist    Code Status  full    Disposition  Transfer to ICU    Review of Systems  Review of Systems   Constitutional: Positive for malaise/fatigue. Negative for chills and fever.   HENT: Negative for congestion, hearing loss and nosebleeds.    Eyes: Negative for pain and redness.   Respiratory: Positive for cough and shortness of breath. Negative for hemoptysis and wheezing.    Cardiovascular: Negative for chest pain and palpitations.   Gastrointestinal: Negative for abdominal pain, blood in stool, constipation, diarrhea, nausea and vomiting.   Genitourinary: Negative for dysuria, frequency and hematuria.   Musculoskeletal: Negative for falls, joint pain and myalgias.   Skin: Negative for rash.   Neurological: Negative for dizziness, tremors, focal weakness, seizures, loss of consciousness, weakness and headaches.   Psychiatric/Behavioral: The patient is not nervous/anxious and does not have insomnia.    All  other systems reviewed and are negative.       Physical Exam  Temp:  [36.3 °C (97.4 °F)-37.1 °C (98.7 °F)] 36.8 °C (98.3 °F)  Pulse:  [] 92  Resp:  [16-25] 18  BP: ()/(32-90) 136/63  SpO2:  [79 %-97 %] 90 %    Physical Exam  Vitals signs and nursing note reviewed.   Constitutional:       General: She is not in acute distress.     Comments: Older appearing, frail   HENT:      Head: Normocephalic and atraumatic.      Right Ear: External ear normal.      Left Ear: External ear normal.      Nose: Nose normal.      Comments: High flow O2 NC apparatus in place     Mouth/Throat:      Mouth: Mucous membranes are moist.   Eyes:      General: No scleral icterus.     Conjunctiva/sclera: Conjunctivae normal.   Neck:      Musculoskeletal: Normal range of motion and neck supple. No neck rigidity.   Cardiovascular:      Rate and Rhythm: Normal rate and regular rhythm.      Pulses: Normal pulses.      Heart sounds: No murmur. No friction rub. No gallop.    Pulmonary:      Effort: Pulmonary effort is normal. No respiratory distress.      Breath sounds: No stridor. Wheezing (occ), rhonchi and rales (crackles) present.   Chest:      Chest wall: No tenderness.   Abdominal:      General: Abdomen is flat. Bowel sounds are normal. There is no distension.      Palpations: Abdomen is soft.      Tenderness: There is no abdominal tenderness. There is no guarding or rebound.   Musculoskeletal: Normal range of motion.         General: No swelling, tenderness or deformity.   Skin:     General: Skin is warm.      Coloration: Skin is not jaundiced.   Neurological:      General: No focal deficit present.      Mental Status: She is alert and oriented to person, place, and time. Mental status is at baseline.   Psychiatric:         Mood and Affect: Mood normal.         Behavior: Behavior normal.         Thought Content: Thought content normal.         Judgment: Judgment normal.         Fluids    Intake/Output Summary (Last 24 hours) at  2/16/2020 1029  Last data filed at 2/16/2020 0956  Gross per 24 hour   Intake 1180 ml   Output --   Net 1180 ml       Laboratory  Recent Labs     02/15/20  1155 02/16/20  0454   WBC 17.1* 12.4*   RBC 3.63* 3.60*   HEMOGLOBIN 11.4* 11.1*   HEMATOCRIT 34.6* 34.6*   MCV 95.3 96.1   MCH 31.4 30.8   MCHC 32.9* 32.1*   RDW 54.0* 54.1*   PLATELETCT 198 206   MPV 9.6 9.7     Recent Labs     02/15/20  1155 02/16/20  0454   SODIUM 138 139   POTASSIUM 3.5* 3.6   CHLORIDE 101 103   CO2 26 27   GLUCOSE 129* 225*   BUN 19 15   CREATININE 0.56 0.43*   CALCIUM 9.1 9.1                   Imaging  CT-CTA CHEST PULMONARY ARTERY W/ RECONS   Final Result      1.  No evidence of pulmonary embolism.   2.  Increased extensive multifocal airspace groundglass and interstitial opacities. Findings could be consistent with edema and/or infection.   3.  Underlying emphysema and interstitial lung disease.   4.  Redemonstrated stellate area of masslike scarring in the left lower lobe measuring 2.6 x 3.1 cm, essentially unchanged from the previous exam and consistent with known malignancy.   5.  Small to moderate sized hiatal hernia.   6.  Hepatic steatosis.            DX-CHEST-PORTABLE (1 VIEW)   Final Result      Significant interval worsening left perihilar/lingular consolidation with possible underlying Mass.      Significant worsening diffuse reticular abnormal opacity is consistent with underlying interstitial lung disease. Superimposed atypical infection or edema is possible      New left lung volume loss           Assessment/Plan  * Acute on chronic respiratory failure with hypoxia, lung cancer, pneumonia, pulmonary fibrosis (HCC)  Assessment & Plan  Patient has been diagnosed with a left lower lobe lung cancer, COPD, pulmonary fibrosis, pulmonary hypertension  She presented with progressive respiratory failure  CT of pulmonary artery negative for PE.  Extensive bilateral multifocal and interstitial groundglass opacities consistent with  edema and/or infection.  Underlying emphysema and interstitial lung disease.   Redemonstrated stellate area of masslike scarring in the left lower lobe measuring 2.6 x 3.1 cm, essentially unchanged from the previous exam and consistent with known malignancy.  We will increase dose of steroids to IV Solu-Medrol 125 every 6 hours  We will treat with antibiotic for possible pulmonary infection  If no improvement, consider repeat transthoracic echo to assess pulmonary artery blood pressure  RT protocol, DuoNeb as needed  Wean down oxygen as tolerated  2/16. Pulmonology consulted but patient will go to ICU anyway so will be seen by intensivist.  Address underlying problems bleow.    Lung cancer, primary, with metastasis from lung to other site ([facility])- (present on admission)  Assessment & Plan  Pulmonary mass has not changed on CT  Follow-up with oncologist for chemotherapy as appropriate  2/16. Follow up with Dr. Callahan.    Pneumonia  Assessment & Plan  -started empirically on IV antibiotics  -RT treatment, bronchodilators  -Follow blood and sputum cultures  2/16. Discussed with Pulmonology  Broaden antibiotics to cefepime and linezolid.  Informed ID pharmacy      Pulmonary fibrosis ([facility])  Assessment & Plan  Will increase dose of steroids due to worsening of hypoxia  Consider pulmonary consultation    COPD (chronic obstructive pulmonary disease) ([facility])- (present on admission)  Assessment & Plan  Patient has no wheezes on exam  RT protocol  DuoNeb as needed  Solu-Medrol for pulmonary fibrosis  2/16. occ wheezes  Continue steroids       VTE prophylaxis: Lovenox SQ  Gastrointestinal prophylaxis: None  Antibiotics:   Ordered Anti-infectives (9999h ago, onward)     Ordered     Start    02/15/20 1559  ampicillin/sulbactam (UNASYN) 3 g in  mL IVPB  EVERY 6 HOURS      02/15/20 1615              Diet:   Orders Placed This Encounter   Procedures   • Diet Order Regular     Standing Status:   Standing     Number of Occurrences:    1     Order Specific Question:   Diet:     Answer:   Regular [1]      Prognosis: Guarded  Risk: The Patient is at HIGH risk for inpatient complications and decompensation secondary to his multiple cormorbidities including No problems updated.   I have personally reviewed notes, labs, vitals, imaging.  I discussed the plan of care with bedside RN as well as on multidisciplinary rounds  I have performed a physical exam and reviewed and updated ROS and Plan today 2/16/2020. In review of yesterday's note 2/15/2020   there are no changes except as documented above.

## 2020-02-16 NOTE — CONSULTS
Critical Care Consultation    Date of consult: 2/16/2020    Referring Physician  Ruben Tinajero M.D.    Reason for Consultation  Acute hypoxic respiratory failure    History of Presenting Illness  65 yo female, former smoker 35pack year quit in 2012 with hx of squamous cell lung CA (dx in 9/2019, in [facility] mets to lymph nodes s/p chemoXRT (last 2 months ago), recently received immunotherapy (durvalumab IV) 3 weeks prior to admission), COPD (on 3L home O2), presented on 2/15 for worsening SOB x2-3 days. Pt was seen by pulmonary Dr. 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 Immunotherapy was held. She was started on high dose prednisone 80mg then taper in the past 3 weeks.     At admission, influenza A/B PCR is negative. pt was on oxymask 5L, sat >90% and progressively increasing to 8L oxymask. Afebrile, SBP in 100-110s. WBC 17K -12K. Creatinine 0.5, HCO3 26. Lactate 1.5. Procalcitonin 0.12. CT chest today showed increased extensive multifocal airspace ground glass and interstitial opacities. No PE. Started on solumedrol 125 Q6H. Pt initially started on unasyn, then changed to linezolid and cefepime. Pt was transferred to ICU for further evaluation       Code Status  Full Code    Review of Systems  Review of Systems   Constitutional: Negative for chills, fever and malaise/fatigue.   Respiratory: Positive for shortness of breath. Negative for cough.    Cardiovascular: Negative for chest pain, palpitations, orthopnea and leg swelling.   Gastrointestinal: Negative for abdominal pain, diarrhea, nausea and vomiting.   Musculoskeletal: Negative for back pain.   Skin: Negative for rash.   Neurological: Positive for dizziness. Negative for weakness and headaches.        Feels dizzy if sat drop to <89% per patient   Psychiatric/Behavioral: The patient is nervous/anxious.    All other systems reviewed and are negative.      Past Medical History   has a past medical history  of Acute respiratory failure with hypoxia (HCC) (11/16/2019), Breath shortness, Cancer (HCC), Constipation (11/20/2019), COPD (chronic obstructive pulmonary disease) (HCC), Elevated brain natriuretic peptide (BNP) level (11/16/2019), Emphysema of lung (HCC), Hiatus hernia syndrome, Hypokalemia (11/16/2019), Jaundice, and Lung cancer, primary, with metastasis from lung to other site (HCC).    Surgical History   has a past surgical history that includes primary c section; bunionectomy; and hand surgery (Right, 1979).    Family History  family history includes Cancer in her father and mother; Hypertension in her father; Lung Disease in her brother; No Known Problems in her sister; Other in her mother.    Social History   reports that she quit smoking about 7 years ago. Her smoking use included cigarettes. She has a 38.00 pack-year smoking history. She has never used smokeless tobacco. She reports previous alcohol use. She reports previous drug use.    Medications  Home Medications     Reviewed by Aislinn Goodrich, Pharmacy Intern (Pharmacy Intern) on 02/15/20 at 1453  Med List Status: Complete   Medication Last Dose Status   Ascorbic Acid (VITAMIN C PO) 2/14/2020 Active   Biotin 5000 MCG Tab 2/14/2020 Active   Cholecalciferol (VITAMIN D PO) 2/14/2020 Active   DURVALUMAB IV 1/23/2020 Active   HYDROcodone-acetaminophen (NORCO) 5-325 MG Tab per tablet 2/14/2020 Active   Magnesium 400 MG Tab 2/14/2020 Active   Multiple Vitamins-Minerals (MULTIVITAMIN PO) 2/14/2020 Active   predniSONE (DELTASONE) 20 MG Tab 2/15/2020 Active   Probiotic Product (PROBIOTIC ADVANCED PO) 2/14/2020 Active   TURMERIC PO 2/14/2020 Active              Current Facility-Administered Medications   Medication Dose Route Frequency Provider Last Rate Last Dose   • ascorbic acid tablet 500 mg  500 mg Oral Once Dino Sykes M.D.       • cefepime (MAXIPIME) 2 g in  mL IVPB  2 g Intravenous Q8HRS Ruben Tinajero M.D.       • Linezolid (ZYVOX)  premix 600 mg  600 mg Intravenous Q12HRS Ruben Tinajero M.D.       • senna-docusate (PERICOLACE or SENOKOT S) 8.6-50 MG per tablet 2 Tab  2 Tab Oral BID Dino Sykes M.D.        And   • polyethylene glycol/lytes (MIRALAX) PACKET 1 Packet  1 Packet Oral QDAY PRN Dino Sykes M.D.        And   • magnesium hydroxide (MILK OF MAGNESIA) suspension 30 mL  30 mL Oral QDAY PRN Dino Sykes M.D.        And   • bisacodyl (DULCOLAX) suppository 10 mg  10 mg Rectal QDAY PRN Dino Sykes M.D.       • Respiratory Therapy Consult   Nebulization Continuous RT Dino Sykes M.D.       • enoxaparin (LOVENOX) inj 40 mg  40 mg Subcutaneous DAILY Dino Sykes M.D.   40 mg at 02/16/20 0511   • acetaminophen (TYLENOL) tablet 650 mg  650 mg Oral Q6HRS PRN Dino Sykes M.D.       • Pharmacy Consult Request ...Pain Management Review 1 Each  1 Each Other PHARMACY TO DOSE Dino Sykes M.D.        And   • oxyCODONE immediate-release (ROXICODONE) tablet 5 mg  5 mg Oral Q3HRS PRN Dino Sykes M.D.   5 mg at 02/16/20 0533    And   • oxyCODONE immediate release (ROXICODONE) tablet 10 mg  10 mg Oral Q3HRS PRN Dino Sykes M.D.        And   • morphine (pf) 4 MG/ML injection 4 mg  4 mg Intravenous Q3HRS PRN Dino Sykes M.D.       • ondansetron (ZOFRAN) syringe/vial injection 4 mg  4 mg Intravenous Q4HRS PRN Dino Sykes M.D.       • ondansetron (ZOFRAN ODT) dispertab 4 mg  4 mg Oral Q4HRS PRN Dino Sykes M.D.       • promethazine (PHENERGAN) tablet 12.5-25 mg  12.5-25 mg Oral Q4HRS PRN Dino Sykes M.D.       • promethazine (PHENERGAN) suppository 12.5-25 mg  12.5-25 mg Rectal Q4HRS PRN Dino Sykes M.D.       • prochlorperazine (COMPAZINE) injection 5-10 mg  5-10 mg Intravenous Q4HRS PRN Dino Sykes M.D.       • guaiFENesin dextromethorphan (ROBITUSSIN DM) 100-10 MG/5ML syrup 10 mL  10 mL Oral Q6HRS PRN Dino Sykes M.D.       • ascorbic acid tablet 1,000 mg   1,000 mg Oral DAILY Dino Sykes M.D.   1,000 mg at 02/16/20 0511   • vitamin D (cholecalciferol) tablet 2,000 Units  2,000 Units Oral DAILY Dino Sykes M.D.   2,000 Units at 02/16/20 0511   • Respiratory Therapy Consult   Nebulization Continuous RT Dino Sykes M.D.       • methylPREDNISolone sod succ (SOLU-MEDROL) 125 MG injection 125 mg  125 mg Intravenous Q6HRS Dino Sykes M.D.   125 mg at 02/16/20 1145   • ipratropium-albuterol (DUONEB) nebulizer solution  3 mL Nebulization Q4HRS (RT) Dino Sykes M.D.   3 mL at 02/16/20 1038   • ipratropium-albuterol (DUONEB) nebulizer solution  3 mL Nebulization Q2HRS PRN (RT) Dino Sykes M.D.           Allergies  No Known Allergies    Vital Signs last 24 hours  Temp:  [36.3 °C (97.4 °F)-37.1 °C (98.7 °F)] 36.7 °C (98 °F)  Pulse:  [] 112  Resp:  [16-24] 17  BP: ()/(32-90) 159/68  SpO2:  [79 %-97 %] 94 %    Physical Exam  Physical Exam  Vitals signs and nursing note reviewed.   Constitutional:       General: She is not in acute distress.     Appearance: Normal appearance. She is ill-appearing. She is not toxic-appearing or diaphoretic.   HENT:      Head: Normocephalic and atraumatic.      Mouth/Throat:      Mouth: Mucous membranes are dry.      Comments: HF nose piece in place  Eyes:      Pupils: Pupils are equal, round, and reactive to light.   Neck:      Musculoskeletal: Neck supple.   Cardiovascular:      Rate and Rhythm: Normal rate and regular rhythm.      Pulses: Normal pulses.      Heart sounds: Normal heart sounds. No murmur.   Pulmonary:      Effort: No respiratory distress.      Breath sounds: Rhonchi and rales present. No wheezing.   Abdominal:      General: Bowel sounds are normal. There is no distension.      Palpations: Abdomen is soft.      Tenderness: There is no abdominal tenderness. There is no guarding.   Musculoskeletal:      Right lower leg: No edema.      Left lower leg: No edema.   Skin:     Capillary Refill:  Capillary refill takes less than 2 seconds.      Coloration: Skin is not jaundiced.      Findings: No bruising or rash.   Neurological:      General: No focal deficit present.      Mental Status: She is alert and oriented to person, place, and time.   Psychiatric:         Mood and Affect: Mood normal.         Fluids    Intake/Output Summary (Last 24 hours) at 2020 1257  Last data filed at 2020 1230  Gross per 24 hour   Intake 1480 ml   Output --   Net 1480 ml       Laboratory  Recent Results (from the past 48 hour(s))   EKG (NOW)    Collection Time: 02/15/20 10:49 AM   Result Value Ref Range    Report       [facility] Emergency Dept.    Test Date:  2020-02-15  Pt Name:    PREM BRADLEY                 Department: ER  MRN:        [id number]                      Room:  Gender:     Female                       Technician: [id number]  :        [id number]                   Requested By:ER TRIAGE PROTOCOL  Order #:    [id number]                    Reading MD: LIVAN MICHAEL MD    Measurements  Intervals                                Axis  Rate:       122                          P:          55  DC:         124                          QRS:        73  QRSD:       76                           T:          51  QT:         308  QTc:        439    Interpretive Statements  SINUS TACHYCARDIA  MULTIPLE PREMATURE COMPLEXES, VENT & SUPRAVEN  LEFT ATRIAL ABNORMALITY  BORDERLINE ST DEPRESSION, DIFFUSE LEADS  Compared to ECG 11/15/2019 16:08:21  ST (T wave) deviation now present  Sinus rhythm no longer present  Ventricular premature complex(es) no longer present  Electronically Signed On 2-15- 2020 14:39:24 PST by LIVAN MICHAEL MD     Lactic acid (lactate)    Collection Time: 02/15/20 11:55 AM   Result Value Ref Range    Lactic Acid 1.5 0.5 - 2.0 mmol/L   CBC WITH DIFFERENTIAL    Collection Time: 02/15/20 11:55 AM   Result Value Ref Range    WBC 17.1 (H) 4.8 - 10.8 K/uL    RBC 3.63 (L) 4.20 - 5.40 M/uL     Hemoglobin 11.4 (L) 12.0 - 16.0 g/dL    Hematocrit 34.6 (L) 37.0 - 47.0 %    MCV 95.3 81.4 - 97.8 fL    MCH 31.4 27.0 - 33.0 pg    MCHC 32.9 (L) 33.6 - 35.0 g/dL    RDW 54.0 (H) 35.9 - 50.0 fL    Platelet Count 198 164 - 446 K/uL    MPV 9.6 9.0 - 12.9 fL    Neutrophils-Polys 92.20 (H) 44.00 - 72.00 %    Lymphocytes 3.60 (L) 22.00 - 41.00 %    Monocytes 2.80 0.00 - 13.40 %    Eosinophils 0.80 0.00 - 6.90 %    Basophils 0.10 0.00 - 1.80 %    Immature Granulocytes 0.50 0.00 - 0.90 %    Nucleated RBC 0.00 /100 WBC    Neutrophils (Absolute) 15.77 (H) 2.00 - 7.15 K/uL    Lymphs (Absolute) 0.62 (L) 1.00 - 4.80 K/uL    Monos (Absolute) 0.48 0.00 - 0.85 K/uL    Eos (Absolute) 0.13 0.00 - 0.51 K/uL    Baso (Absolute) 0.02 0.00 - 0.12 K/uL    Immature Granulocytes (abs) 0.09 0.00 - 0.11 K/uL    NRBC (Absolute) 0.00 K/uL   COMP METABOLIC PANEL    Collection Time: 02/15/20 11:55 AM   Result Value Ref Range    Sodium 138 135 - 145 mmol/L    Potassium 3.5 (L) 3.6 - 5.5 mmol/L    Chloride 101 96 - 112 mmol/L    Co2 26 20 - 33 mmol/L    Anion Gap 11.0 0.0 - 11.9    Glucose 129 (H) 65 - 99 mg/dL    Bun 19 8 - 22 mg/dL    Creatinine 0.56 0.50 - 1.40 mg/dL    Calcium 9.1 8.5 - 10.5 mg/dL    AST(SGOT) 20 12 - 45 U/L    ALT(SGPT) 19 2 - 50 U/L    Alkaline Phosphatase 57 30 - 99 U/L    Total Bilirubin 0.5 0.1 - 1.5 mg/dL    Albumin 3.5 3.2 - 4.9 g/dL    Total Protein 6.6 6.0 - 8.2 g/dL    Globulin 3.1 1.9 - 3.5 g/dL    A-G Ratio 1.1 g/dL   BLOOD CULTURE    Collection Time: 02/15/20 11:55 AM   Result Value Ref Range    Significant Indicator NEG     Source BLD     Site PERIPHERAL     Culture Result       No Growth  Note: Blood cultures are incubated for 5 days and  are monitored continuously.Positive blood cultures  are called to the RN and reported as soon as  they are identified.     ESTIMATED GFR    Collection Time: 02/15/20 11:55 AM   Result Value Ref Range    GFR If African American >60 >60 mL/min/1.73 m 2    GFR If Non African  American >60 >60 mL/min/1.73 m 2   Lactic acid (lactate): Repeat if initial lactic acid result is greater than 2    Collection Time: 02/15/20 12:45 PM   Result Value Ref Range    Lactic Acid 1.2 0.5 - 2.0 mmol/L   BLOOD CULTURE    Collection Time: 02/15/20 12:45 PM   Result Value Ref Range    Significant Indicator NEG     Source BLD     Site PERIPHERAL     Culture Result       No Growth  Note: Blood cultures are incubated for 5 days and  are monitored continuously.Positive blood cultures  are called to the RN and reported as soon as  they are identified.     proBrain Natriuretic Peptide, NT    Collection Time: 02/15/20 12:45 PM   Result Value Ref Range    NT-proBNP 24 0 - 125 pg/mL   Influenza A/B By PCR (Adult - Flu Only)    Collection Time: 02/15/20 12:53 PM   Result Value Ref Range    Influenza virus A RNA Negative Negative    Influenza virus B, PCR Negative Negative   Procalcitonin    Collection Time: 02/15/20  7:55 PM   Result Value Ref Range    Procalcitonin 0.12 <0.25 ng/mL   URINALYSIS    Collection Time: 02/16/20  4:25 AM   Result Value Ref Range    Color Yellow     Character Clear     Specific Gravity 1.020 <1.035    Ph 6.0 5.0 - 8.0    Glucose Negative Negative mg/dL    Ketones Negative Negative mg/dL    Protein Negative Negative mg/dL    Bilirubin Negative Negative    Urobilinogen, Urine 0.2 Negative    Nitrite Negative Negative    Leukocyte Esterase Negative Negative    Occult Blood Negative Negative    Micro Urine Req see below    CBC with Differential    Collection Time: 02/16/20  4:54 AM   Result Value Ref Range    WBC 12.4 (H) 4.8 - 10.8 K/uL    RBC 3.60 (L) 4.20 - 5.40 M/uL    Hemoglobin 11.1 (L) 12.0 - 16.0 g/dL    Hematocrit 34.6 (L) 37.0 - 47.0 %    MCV 96.1 81.4 - 97.8 fL    MCH 30.8 27.0 - 33.0 pg    MCHC 32.1 (L) 33.6 - 35.0 g/dL    RDW 54.1 (H) 35.9 - 50.0 fL    Platelet Count 206 164 - 446 K/uL    MPV 9.7 9.0 - 12.9 fL    Neutrophils-Polys 98.20 (H) 44.00 - 72.00 %    Lymphocytes 1.80 (L)  22.00 - 41.00 %    Monocytes 0.00 0.00 - 13.40 %    Eosinophils 0.00 0.00 - 6.90 %    Basophils 0.00 0.00 - 1.80 %    Nucleated RBC 0.00 /100 WBC    Neutrophils (Absolute) 12.18 (H) 2.00 - 7.15 K/uL    Lymphs (Absolute) 0.22 (L) 1.00 - 4.80 K/uL    Monos (Absolute) 0.00 0.00 - 0.85 K/uL    Eos (Absolute) 0.00 0.00 - 0.51 K/uL    Baso (Absolute) 0.00 0.00 - 0.12 K/uL    NRBC (Absolute) 0.00 K/uL    Hypochromia 1+     Anisocytosis 1+     Microcytosis 1+    Comp Metabolic Panel (CMP)    Collection Time: 02/16/20  4:54 AM   Result Value Ref Range    Sodium 139 135 - 145 mmol/L    Potassium 3.6 3.6 - 5.5 mmol/L    Chloride 103 96 - 112 mmol/L    Co2 27 20 - 33 mmol/L    Anion Gap 9.0 0.0 - 11.9    Glucose 225 (H) 65 - 99 mg/dL    Bun 15 8 - 22 mg/dL    Creatinine 0.43 (L) 0.50 - 1.40 mg/dL    Calcium 9.1 8.5 - 10.5 mg/dL    AST(SGOT) 16 12 - 45 U/L    ALT(SGPT) 20 2 - 50 U/L    Alkaline Phosphatase 57 30 - 99 U/L    Total Bilirubin 0.4 0.1 - 1.5 mg/dL    Albumin 3.6 3.2 - 4.9 g/dL    Total Protein 6.8 6.0 - 8.2 g/dL    Globulin 3.2 1.9 - 3.5 g/dL    A-G Ratio 1.1 g/dL   ESTIMATED GFR    Collection Time: 02/16/20  4:54 AM   Result Value Ref Range    GFR If African American >60 >60 mL/min/1.73 m 2    GFR If Non African American >60 >60 mL/min/1.73 m 2   DIFFERENTIAL MANUAL    Collection Time: 02/16/20  4:54 AM   Result Value Ref Range    Manual Diff Status PERFORMED    PERIPHERAL SMEAR REVIEW    Collection Time: 02/16/20  4:54 AM   Result Value Ref Range    Peripheral Smear Review see below    PLATELET ESTIMATE    Collection Time: 02/16/20  4:54 AM   Result Value Ref Range    Plt Estimation Normal    MORPHOLOGY    Collection Time: 02/16/20  4:54 AM   Result Value Ref Range    RBC Morphology Present     Poikilocytosis 1+     Tear Drop Cells 1+    ABG - LAB    Collection Time: 02/16/20 11:57 AM   Result Value Ref Range    Ph 7.50 7.40 - 7.50    Pco2 30.8 26.0 - 37.0 mmHg    Po2 76.4 64.0 - 87.0 mmHg    O2 Saturation 94.9  93.0 - 99.0 %    Hco3 23 17 - 25 mmol/L    Base Excess 1 -4 - 3 mmol/L    Body Temp see below Centigrade       Imaging  CT-CTA CHEST PULMONARY ARTERY W/ RECONS   Final Result      1.  No evidence of pulmonary embolism.   2.  Increased extensive multifocal airspace groundglass and interstitial opacities. Findings could be consistent with edema and/or infection.   3.  Underlying emphysema and interstitial lung disease.   4.  Redemonstrated stellate area of masslike scarring in the left lower lobe measuring 2.6 x 3.1 cm, essentially unchanged from the previous exam and consistent with known malignancy.   5.  Small to moderate sized hiatal hernia.   6.  Hepatic steatosis.            DX-CHEST-PORTABLE (1 VIEW)   Final Result      Significant interval worsening left perihilar/lingular consolidation with possible underlying Mass.      Significant worsening diffuse reticular abnormal opacity is consistent with underlying interstitial lung disease. Superimposed atypical infection or edema is possible      New left lung volume loss          Last TTE in 11/2019 was LVEF 60%, normal RV size and function. No valvular disease    Assessment/Plan  * Acute respiratory failure with hypoxia (HCC)  Assessment & Plan  Progression of diffuse airspace opacities with chronic fibrotic changes in the background of emphysema concerning of progression of immunotherapy/radiation ILD +/- ILD execarbation +/- pneumonia.   DDx of pneumonia include PJP (pt was on prednisone 80mg and taper in the past month without PJP prophx), bacterial pneumonia (strep pneumo, staph, GNB), non-flu viral pneumonia (influenza A/B PCR negative), fungal.     Plan:   Continue HFNC to actively titrate to keep sat >88%  Agree with linezolid and cefepime  Add TMP/SMX 15mg/kg IV Q6H. Monitor renal function   Continue solumedrol 125 IV Q6H  Check full respiratory viral PCR from nasopharynx  Check fungitell, aspergillus, cryptococcus antigen, cocci Ab  Urine strep pnuemo and  urine legionella antigen  TTE to rule out pulm HTN induced ILD, and complete assessment. On my bedside echo, LVEF appears grossly preserved with normal RV size/function. IVC is collapsed >50% on resp variation.  Keep fluid balance even.  Avoid unnecessary fluids      Lung cancer, primary, with metastasis from lung to other site (HCC)- (present on admission)  Assessment & Plan  Hx of squamous cell lung CA (dx in 9/2019, in [facility] met[facility] to lymph nodes   s/p chemoXRT (last 2 months ago)  recently received immunotherapy (durvalumab IV) 3 weeks prior to admission      COPD (chronic obstructive pulmonary disease) (HCC)- (present on admission)  Assessment & Plan  Background COPD with 2-3L home O2    Had long discussion with family (daughter) and patient regarding goal of care. Pt at this time would like FULL CODE in the event of cardiac/respiratory arrest.     Discussed patient condition and risk of morbidity and/or mortality with Family, RN, RT, Pharmacy and Patient.      I have assessed her respiratory status, blood pressure, hemodynamics, cardiovascular and neurological status.  SHe is at increased risk for worsening respiratory, cardiovascular and neurological system dysfunction.  SHe is at significant risk for requiring intubation mechanical ventilatory support.  High risk of deterioration and worsening vital organ dysfunction and death without the above critical care interventions.     The patient remains critically ill.  Critical care time = 60 minutes in directly providing and coordinating critical care and extensive data review.  No time overlap and excludes procedures.

## 2020-02-16 NOTE — ASSESSMENT & PLAN NOTE
-started empirically on IV antibiotics  -RT treatment, bronchodilators  -Follow blood and sputum cultures  2/16. Discussed with Pulmonology  Broaden antibiotics to cefepime and linezolid.  Informed ID pharmacy

## 2020-02-16 NOTE — ASSESSMENT & PLAN NOTE
Hx of squamous cell lung CA (dx in 9/2019, in [facility] mets to lymph nodes   s/p chemoXRT (last 2 months ago)  recently received immunotherapy (durvalumab IV) 3 weeks prior to admission

## 2020-02-16 NOTE — CODE DOCUMENTATION
Primary RN spoke with admitting MD.  MD states keep patient on floor.  If O2 requirements increase call for transfer.  No further needs from bedside nurse at this time.

## 2020-02-16 NOTE — ASSESSMENT & PLAN NOTE
Pulmonary mass has not changed on CT  Follow-up with oncologist for chemotherapy as appropriate  2/16. Follow up with Dr. Callahan.

## 2020-02-17 NOTE — CARE PLAN
Problem: Oxygenation:  Goal: Maintain adequate oxygenation dependent on patient condition  Outcome: PROGRESSING AS EXPECTED   Pt remains on HHFNC 60L/90%      Problem: Bronchoconstriction:  Goal: Improve in air movement and diminished wheezing  Outcome: PROGRESSING AS EXPECTED   Duoneb Q4H and PRN

## 2020-02-17 NOTE — PROGRESS NOTES
Tech from Lab called with critical result of WBC 30.1 at 0500. Critical lab result read back to Tech.   Dr. Chisholm notified of critical lab result at 0505.  Critical lab result read back by Dr. Chisholm.

## 2020-02-17 NOTE — CONSULTS
INFECTIOUS DISEASES INPATIENT CONSULT NOTE     Date of Service: 2/17/2020    Consult Requested By: Kenton Tovar M.D.    Reason for Consultation: Acute hypoxic respiratory failure    History of Present Illness:   Michelle Billingsley is a 64 y.o. woman with metastatic squamous cell lung cancer status post chemo radiation, prior smoker, COPD and chronic hypoxia on 3 L nasal cannula at baseline admitted 2/15/2020 for worsening shortness of breath.  Patient was recently diagnosed with metastatic small cell lung cancer in September 2019.  She follows with Dr. Mahi José and was most recently was receiving immunotherapy in addition to high-dose steroids prior to admission.  She had also been oxygen dependent for the last several months.  She was recently seen by a pulmonologist, Dr. Longo and was diagnosed with pulmonary fibrosis.  Her immunotherapy was suspended and she was on a steroid taper prior to admission starting at 80 mg daily.  She had been complaining of worsening shortness of breath for approximately 1 week prior to admission.  She was increasing her oxygen requirements at night and she found herself to be satting in the 50s and 60s.  She also admits to a productive cough of clear sputum.  Denies any hemoptysis.  She denies any fevers or chills.  No recent ill contacts or travel.  Several weeks prior to admission, she had a viral gastrointestinal infection with nausea and vomiting but denies any further gastrointestinal symptoms at this time.  No abdominal pain or diarrhea.  Given her persistent shortness of breath, she presented to the ED for further evaluation and management.  On presentation, she was afebrile with a leukocytosis of 17.1.  CT of the chest was negative for pulmonary embolism however it revealed increased extensive multifocal airspace groundglass and interstitial opacities with underlying emphysema.  There is also a masslike scarring in the left lower lobe measuring 2.6 x 3.1 cm  "consistent with known malignancy.  She is currently on BiPAP.  Blood cultures are currently negative to date.  Influenza PCR is also negative.  Infectious work-up is also pending.  Patient is currently on cefepime, linezolid and Bactrim.  Infectious disease service consulted for further antibiotic recommendations and management.    Review of Systems   Constitutional: Negative for chills and fever.   Respiratory: Positive for cough, sputum production and shortness of breath. Negative for hemoptysis.    Gastrointestinal: Negative for abdominal pain, diarrhea, nausea and vomiting.   Genitourinary: Negative for dysuria.   Neurological: Negative for dizziness and headaches.   All other systems reviewed and are negative.      PMH:   Past Medical History:   Diagnosis Date   • Acute respiratory failure with hypoxia (HCC) 11/16/2019   • Breath shortness     MD aware   • Cancer (HCC)     lung   • Constipation 11/20/2019   • COPD (chronic obstructive pulmonary disease) (HCC)    • Elevated brain natriuretic peptide (BNP) level 11/16/2019   • Emphysema of lung (HCC)     pending O2 2-3L, stopped inhalers   • Hiatus hernia syndrome     possible   • Hypokalemia 11/16/2019   • Jaundice     gallbladder \"filled with stones\" currently   • Lung cancer, primary, with metastasis from lung to other site (HCC)        PSH:  Past Surgical History:   Procedure Laterality Date   • HAND SURGERY Right 1979   • BUNIONECTOMY     • PRIMARY C SECTION         FAMILY HX:  Family History   Problem Relation Age of Onset   • Cancer Mother         lung   • Other Mother         Addision disease   • Cancer Father         lung   • Hypertension Father    • No Known Problems Sister    • Lung Disease Brother         emphysema       SOCIAL HX:  Social History     Socioeconomic History   • Marital status:      Spouse name: Not on file   • Number of children: Not on file   • Years of education: Not on file   • Highest education level: Not on file "   Occupational History   • Not on file   Social Needs   • Financial resource strain: Not on file   • Food insecurity     Worry: Not on file     Inability: Not on file   • Transportation needs     Medical: Not on file     Non-medical: Not on file   Tobacco Use   • Smoking status: Former Smoker     Packs/day: 1.00     Years: 38.00     Pack years: 38.00     Types: Cigarettes     Last attempt to quit: 11/15/2012     Years since quittin.2   • Smokeless tobacco: Never Used   Substance and Sexual Activity   • Alcohol use: Not Currently     Frequency: Never   • Drug use: Not Currently   • Sexual activity: Not on file   Lifestyle   • Physical activity     Days per week: Not on file     Minutes per session: Not on file   • Stress: Not on file   Relationships   • Social connections     Talks on phone: Not on file     Gets together: Not on file     Attends Sikhism service: Not on file     Active member of club or organization: Not on file     Attends meetings of clubs or organizations: Not on file     Relationship status: Not on file   • Intimate partner violence     Fear of current or ex partner: Not on file     Emotionally abused: Not on file     Physically abused: Not on file     Forced sexual activity: Not on file   Other Topics Concern   • Primary/coprimary nurse & associates Not Asked   • Family contact information Not Asked   • OK to release patient information to the following Not Asked   • Patient preferred routine/Privacy concerns Not Asked   • Patient likes and dislikes Not Asked   • Participating In Research Study Not Asked   • Miscellaneous Not Asked   Social History Narrative   • Not on file     Social History     Tobacco Use   Smoking Status Former Smoker   • Packs/day: 1.00   • Years: 38.00   • Pack years: 38.00   • Types: Cigarettes   • Last attempt to quit: 11/15/2012   • Years since quittin.2   Smokeless Tobacco Never Used     Social History     Substance and Sexual Activity   Alcohol Use Not  Currently   • Frequency: Never       Allergies/Intolerances:  No Known Allergies    History reviewed with the patient     Other Current Medications:    Current Facility-Administered Medications:   •  furosemide (LASIX) injection 20 mg, 20 mg, Intravenous, Once, Kenton Tovar M.D.  •  cefepime (MAXIPIME) 2 g in  mL IVPB, 2 g, Intravenous, Q8HRS, Ruben Tniajero M.D., Stopped at 02/17/20 0615  •  Linezolid (ZYVOX) premix 600 mg, 600 mg, Intravenous, Q12HRS, Ruben Tinajero M.D., Stopped at 02/17/20 0657  •  sulfamethoxazole-trimethoprim (SEPTRA) 336 mg of trimethoprim in D5W 500 mL IVPB, 15 mg/kg/day of trimethoprim, Intravenous, Q6HRS, Osvaldo White D.O., Last Rate: 333.3 mL/hr at 02/17/20 0629, 336 mg of trimethoprim at 02/17/20 0629  •  senna-docusate (PERICOLACE or SENOKOT S) 8.6-50 MG per tablet 2 Tab, 2 Tab, Oral, BID, 2 Tab at 02/17/20 0546 **AND** polyethylene glycol/lytes (MIRALAX) PACKET 1 Packet, 1 Packet, Oral, QDAY PRN **AND** magnesium hydroxide (MILK OF MAGNESIA) suspension 30 mL, 30 mL, Oral, QDAY PRN **AND** bisacodyl (DULCOLAX) suppository 10 mg, 10 mg, Rectal, QDAY PRN, Dino Sykes M.D.  •  Respiratory Therapy Consult, , Nebulization, Continuous RT, Dino Sykes M.D.  •  enoxaparin (LOVENOX) inj 40 mg, 40 mg, Subcutaneous, DAILY, Dino Sykes M.D., 40 mg at 02/17/20 0546  •  acetaminophen (TYLENOL) tablet 650 mg, 650 mg, Oral, Q6HRS PRN, Dino Sykes M.D., 650 mg at 02/16/20 1543  •  Notify provider if pain remains uncontrolled, , , CONTINUOUS **AND** Use the numeric rating scale (NRS-11) on regular floors and Critical-Care Pain Observation Tool (CPOT) on ICUs/Trauma to assess pain, , , CONTINUOUS **AND** Pulse Ox (Oximetry), , , CONTINUOUS **AND** Pharmacy Consult Request ...Pain Management Review 1 Each, 1 Each, Other, PHARMACY TO DOSE **AND** If patient difficult to arouse and/or has respiratory depression, stop any opiates that are currently infusing and call a  "Rapid Response., , , CONTINUOUS **AND** oxyCODONE immediate-release (ROXICODONE) tablet 5 mg, 5 mg, Oral, Q3HRS PRN, 5 mg at 20 **AND** oxyCODONE immediate release (ROXICODONE) tablet 10 mg, 10 mg, Oral, Q3HRS PRN **AND** morphine (pf) 4 MG/ML injection 4 mg, 4 mg, Intravenous, Q3HRS PRN, Dino Sykes M.D.  •  ondansetron (ZOFRAN) syringe/vial injection 4 mg, 4 mg, Intravenous, Q4HRS PRN, Dino Sykes M.D.  •  ondansetron (ZOFRAN ODT) dispertab 4 mg, 4 mg, Oral, Q4HRS PRN, Dino Sykes M.D.  •  promethazine (PHENERGAN) tablet 12.5-25 mg, 12.5-25 mg, Oral, Q4HRS PRN, Dino Sykes M.D.  •  promethazine (PHENERGAN) suppository 12.5-25 mg, 12.5-25 mg, Rectal, Q4HRS PRN, Dino Sykes M.D.  •  prochlorperazine (COMPAZINE) injection 5-10 mg, 5-10 mg, Intravenous, Q4HRS PRN, Dino Sykes M.D.  •  guaiFENesin dextromethorphan (ROBITUSSIN DM) 100-10 MG/5ML syrup 10 mL, 10 mL, Oral, Q6HRS PRN, Dino Sykes M.D.  •  ascorbic acid tablet 1,000 mg, 1,000 mg, Oral, DAILY, Dino Sykes M.D., 1,000 mg at 2045  •  vitamin D (cholecalciferol) tablet 2,000 Units, 2,000 Units, Oral, DAILY, Dino Sykes M.D., 2,000 Units at 20 0545  •  Respiratory Therapy Consult, , Nebulization, Continuous RT, Dino Sykes M.D.  •  methylPREDNISolone sod succ (SOLU-MEDROL) 125 MG injection 125 mg, 125 mg, Intravenous, Q6HRS, Dino Sykes M.D., 125 mg at 20 0545  •  ipratropium-albuterol (DUONEB) nebulizer solution, 3 mL, Nebulization, Q4HRS (RT), Dino Sykes M.D., 3 mL at 20 0731  •  ipratropium-albuterol (DUONEB) nebulizer solution, 3 mL, Nebulization, Q2HRS PRN (RT), Dino Sykes M.D.  [unfilled]    Most Recent Vital Signs:  /59   Pulse (!) 113   Temp 36.9 °C (98.4 °F) (Temporal)   Resp (!) 26   Ht 1.727 m (5' 8\")   Wt 89.8 kg (197 lb 15.6 oz)   SpO2 90%   BMI 30.10 kg/m²   Temp  Av.7 °C (98.1 °F)  Min: 36.3 °C (97.4 °F)  " Max: 37.1 °C (98.7 °F)    Physical Exam:  General: well nourished, no diaphoresis, well-appearing, conversational tachypnea  HEENT: sclera anicteric, PERRL, extraocular muscles intact, mucous membranes moist, oropharynx clear and moist, no oral lesions or exudate.  BiPAP  Neck: supple, no lymphadenopathy  Chest: CTAB, + rhonchi but no wheezes, tachypneic.  Right upper chest port in place-nontender, no surrounding erythema  Cardiac: regular rate and rhythm, normal S1 S2, no murmurs, rubs or gallops  Abdomen: + bowel sounds, soft, non-tender, non-distended, no hepatosplenomegaly  Extremities: WWP, no edema, 2+ pedal pulses  Skin: warm and dry, no rashes or worrisome lesions  Neuro: Alert and oriented times 3, non-focal exam, speech fluent, full range of motion to bilateral upper and lower extremities  Psych: normal mood and behavior, pleasant; memory intact, normal judgement    Pertinent Lab Results:  Recent Labs     02/15/20  1155 02/16/20  0454 02/17/20  0430   WBC 17.1* 12.4* 30.1*      Recent Labs     02/15/20  1155 02/16/20  0454 02/17/20  0430   HEMOGLOBIN 11.4* 11.1* 10.2*   HEMATOCRIT 34.6* 34.6* 31.5*   MCV 95.3 96.1 96.3   MCH 31.4 30.8 31.2   ANISOCYTOSIS  --  1+  --    PLATELETCT 198 206 198         Recent Labs     02/15/20  1155 02/16/20  0454 02/17/20  0430   SODIUM 138 139 141   POTASSIUM 3.5* 3.6 3.7   CHLORIDE 101 103 105   CO2 26 27 27   CREATININE 0.56 0.43* 0.55        Recent Labs     02/15/20  1155 02/16/20  0454 02/17/20  0430   ALBUMIN 3.5 3.6 3.2        Pertinent Micro:  Results     Procedure Component Value Units Date/Time    MRSA By PCR (Amp) [[id number]] Collected:  02/16/20 1315    Order Status:  Completed Specimen:  Respirate from Nares Updated:  02/16/20 1901     Significant Indicator NEG     Source RESP     Site NARES     MRSA PCR Negative for MRSA by PCR.    Narrative:       Collected By:[id number] BERTRAM JACOME  Per [facility] Protocol  Collected By:[id number] BERTRAM JACOME     "Cryptococcal Antigen [[id number]]     Order Status:  Sent Specimen:  Blood     CULTURE RESPIRATORY W/ GRM STN [[id number]]     Order Status:  Completed Specimen:  Respirate from Sputum     BLOOD CULTURE [[id number]] Collected:  02/15/20 1155    Order Status:  Completed Specimen:  Blood from Peripheral Updated:  02/16/20 0939     Significant Indicator NEG     Source BLD     Site PERIPHERAL     Culture Result No Growth  Note: Blood cultures are incubated for 5 days and  are monitored continuously.Positive blood cultures  are called to the RN and reported as soon as  they are identified.      Narrative:       Per Hospital Policy: Only change Specimen Src: to \"Line\" if  specified by physician order.  Right Wrist    BLOOD CULTURE [[id number]] Collected:  02/15/20 1245    Order Status:  Completed Specimen:  Blood from Peripheral Updated:  02/16/20 0939     Significant Indicator NEG     Source BLD     Site PERIPHERAL     Culture Result No Growth  Note: Blood cultures are incubated for 5 days and  are monitored continuously.Positive blood cultures  are called to the RN and reported as soon as  they are identified.      Narrative:       Per Hospital Policy: Only change Specimen Src: to \"Line\" if  specified by physician order.  Right AC    URINALYSIS [[id number]] Collected:  02/16/20 0425    Order Status:  Completed Specimen:  Urine Updated:  02/16/20 0517     Color Yellow     Character Clear     Specific Gravity 1.020     Ph 6.0     Glucose Negative mg/dL      Ketones Negative mg/dL      Protein Negative mg/dL      Bilirubin Negative     Urobilinogen, Urine 0.2     Nitrite Negative     Leukocyte Esterase Negative     Occult Blood Negative     Micro Urine Req see below     Comment: Microscopic examination not performed when specimen is clear  and chemically negative for protein, blood, leukocyte esterase  and nitrite.         Narrative:       Indication for culture:->Septic Shock: Persistent  hypotension,  Lactic acid > 4, " vasopressors/inotropes started    URINE CULTURE(NEW) [[id number]] Collected:  02/16/20 0425    Order Status:  Completed Specimen:  Urine Updated:  02/16/20 0431    Narrative:       Indication for culture:->Septic Shock: Persistent  hypotension,  Lactic acid > 4, vasopressors/inotropes started    Culture Respiratory W/ GRM STN [[id number]]     Order Status:  Completed Specimen:  Respirate from Sputum     Influenza A/B By PCR (Adult - Flu Only) [[id number]] Collected:  02/15/20 1253    Order Status:  Completed Specimen:  Respirate from Nasopharyngeal Updated:  02/15/20 1337     Influenza virus A RNA Negative     Influenza virus B, PCR Negative    Narrative:       Indication for culture:->Septic Shock: Persistent  hypotension,  Lactic acid > 4, vasopressors/inotropes started        No results found for: BLOODCULTU, BLDCULT, BCHOLD     Studies:  Dx-chest-portable (1 View)    Result Date: 2/15/2020  2/15/2020 11:50 AM HISTORY/REASON FOR EXAM: possible sepsis.. Shortness of breath. Lung carcinoma. TECHNIQUE/EXAM DESCRIPTION AND NUMBER OF VIEWS: Single AP view of the chest. COMPARISON: 11/15/2019 FINDINGS: Hardware: Right IJ portacatheter tip overlies the brachiocephalic confluence Lungs: Significant interval worsening consolidated opacity in the left lung with some volume loss noted. There is also worsening diffuse, bilateral reticular opacification Pleura:  Possible layering left pleural fluid Heart and mediastinum: Left heart border is no longer visualized, completely effaced. No gross cardiac silhouette enlargement     Significant interval worsening left perihilar/lingular consolidation with possible underlying Mass. Significant worsening diffuse reticular abnormal opacity is consistent with underlying interstitial lung disease. Superimposed atypical infection or edema is possible New left lung volume loss    Ir-cvc Port Placement > Age 5    Result Date: 1/30/2020 1/30/2020 9:34 AM HISTORY/REASON FOR EXAM:  Patient  requires central venous access for chemotherapy. TECHNIQUE/EXAM DESCRIPTION: Following informed consent patient was prepped and draped in the usual fashion.  A total of 14 cc of 1% lidocaine with epinephrine was utilized for local and subcutaneous anesthesia. SEDATION: 4 mg of Versed and 100 mcg of fentanyl were utilized for IV conscious sedation. The procedure was monitored continuously by the sedation nurse. 30 minutes of sedation time were utilized for the procedure. Fluoroscopy images: 3 fluoroscopic images obtained. Fluoroscopy time: 0.6 minutes The procedure was performed with MAXIMUM STERILE BARRIER TECHNIQUE including sterile gown, mask, cap, and done in a sterile gloves following appropriate hand hygiene and/or sterile scrub. The patient's skin site was prepped with 2% chlorhexidine solution. Ultrasound evaluation of the right internal jugular vein demonstrated patency and an image was archived in the PACS system. Utilizing ultrasonic and fluoroscopic guidance right internal jugular vein was accessed and an 8-[place] sheath dilator was positioned in the right internal jugular vein.  Next utilizing a combination of blunt and sharp dissection the port pocket was created in the right infraclavicular fossa.  The port was subsequently attached to the catheter and utilizing a tunneling device a subcutaneous tract was created to the neck puncture site.  The catheter was pulled through the tunnel and trimmed to length.  Next the catheter was advanced through the peel-away sheath which was subsequently removed. The port pocket was closed with 4 deep 2-0 Vicryl sutures, the skin was closed Dermabond.  The  neck puncture site was closed with Dermabond and fluoroscopic images were obtained.  The patient tolerated procedure well. COMPARISON:  None FINDINGS: Fluoroscopic images revealed good positioning of the port in the right infraclavicular fossa.  There is a smooth arc of the catheter into the right internal jugular  vein.  The distal tip of the catheter is at the caval atrial junction.     Successful percutaneous placement of Port-A-Cath via right internal jugular approach utilizing ultrasonic and fluoroscopic guidance.    Ct-cta Chest Pulmonary Artery W/ Recons    Result Date: 2/15/2020  2/15/2020 10:22 PM HISTORY/REASON FOR EXAM:  Shortness of breath TECHNIQUE/EXAM DESCRIPTION: CT angiogram scan for pulmonary embolism with contrast, with reconstructions. 1.25 mm helical sections were obtained from the lung apices through the lung bases following the rapid bolus administration of 55 mL of Omnipaque 350 nonionic contrast. Thin-section overlapping reconstruction interval was utilized. Coronal reconstructions were generated from the axial data. MIP post processing was performed and utilized for the interpretation. Low dose optimization technique was utilized for this CT exam including automated exposure control and adjustment of the mA and/or kV according to patient size. COMPARISON: 1/14/2020 FINDINGS: Pulmonary Embolism: No. Main Pulmonary Arteries: No. Segmental branches: No. Subsegmental branches: No. Additional Comments: None. Lungs: There are increased extensive multifocal airspace groundglass and interstitial opacities. There is underlying emphysema and interstitial lung disease. There is a redemonstrated stellate area of masslike scarring in the left lower lobe measuring 2.6 x 3.1 cm, essentially unchanged from previous exam. Pleura: No pleural effusion. Nodes: No enlarged lymph nodes. Additional findings: There is a small to moderate sized hiatal hernia. There is diffuse low-attenuation of the hepatic parenchyma consistent with steatosis.     1.  No evidence of pulmonary embolism. 2.  Increased extensive multifocal airspace groundglass and interstitial opacities. Findings could be consistent with edema and/or infection. 3.  Underlying emphysema and interstitial lung disease. 4.  Redemonstrated stellate area of masslike  scarring in the left lower lobe measuring 2.6 x 3.1 cm, essentially unchanged from the previous exam and consistent with known malignancy. 5.  Small to moderate sized hiatal hernia. 6.  Hepatic steatosis.       IMPRESSION:   1.  Acute on chronic hypoxic respiratory failure    2.  Pulmonary infiltrates  3.  Metastatic small cell lung cancer  4.  Oxygen dependence  5.  Chronic immunosuppression  6.  Leukocytosis  7.  COPD      PLAN:   Michelle Billingsley is a 64 y.o. woman with a history of metastatic squamous cell lung cancer status post chemoradiation, recent immunotherapy and on a high-dose steroid taper prior to admission, oxygen dependence, COPD and chronic hypoxia admitted for worsening shortness of breath.  Patient found to have acute on chronic respiratory failure.  Imaging reveals extensive multifocal airspace groundglass and interstitial opacities highly concerning for infection.  She was recently on a high-dose steroid taper starting at 80 mg daily and thus is certainly at risk for opportunistic infections.  Extensive infectious disease work-up is currently pending including beta D glucan, Aspergillus antibody, cryptococcal antigen, PCP, and coccidiomycosis.  Will follow these results.  HIV is negative.  Agree with continuing Zyvox, cefepime and Bactrim for now.  Will narrow antibiotics as needed.  Continue supportive care by the ICU team.  Further recommendations per hospital course.      Plan of care discussed with intensivist Kenton Tovar M.D. and bedside RN. Will continue to follow    Lorri Worthy M.D.      Please note that this dictation was created using voice recognition software. I have worked with technical experts from [facility] to optimize the interface.  I have made every reasonable attempt to correct obvious errors, but there may be errors of grammar and possibly content that I did not discover before finalizing the note.

## 2020-02-17 NOTE — RESPIRATORY CARE
COPD EDUCATION by COPD CLINICAL EDUCATOR  2/17/2020 at 11:02 AM by Jennifer Wolf, RRT     COPD Education provided?  yes    Does patient currently use inhalers/nebulizer at home? Has inhalers no nebulizer    Additional medication/equipment recommendations pt is requesting a Nebulizer for home (order for assist)    Is all Respiratory DME in place? Yes-Pending above                   If yes, has patient been educated on use of DME?   yes    Have all necessary follow up appointments been scheduled?   PCP or D/C clinic yes   Specialty- Called ext [phone] to change 2/18/2020 appointment with Dr Longo.  will visit and change    Has the patient been referred to Pulmonary Rehab? Yes     Has the patient been referred to the [facility] COPD Program? Yes (unsure of eligibility)

## 2020-02-17 NOTE — CARE PLAN
Problem: Safety  Goal: Will remain free from injury  Note: Bed locked and in low position with call light and belongings within reach. Bed alarm on.     Problem: Respiratory:  Goal: Respiratory status will improve  Note: Collaborated with RT and MD to titrate O2 as needed throughout shift. Currently on HFNC

## 2020-02-17 NOTE — PROGRESS NOTES
Critical Care Progress Note    Date of admission  2/15/2020    Chief Complaint  63 yo female, former smoker 35pack year quit in 2012 with hx of squamous cell lung CA (dx in 9/2019, in [facility] mets to lymph nodes s/p chemoXRT (last 2 months ago), recently received immunotherapy (durvalumab IV) 3 weeks prior to admission), COPD (on 3L home O2), presented on 2/15 for worsening SOB x2-3 days. Pt was seen by pulmonary Dr. 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 Immunotherapy was held. She was started on high dose prednisone 80mg then taper in the past 3 weeks.      At admission, influenza A/B PCR is negative. pt was on oxymask 5L, sat >90% and progressively increasing to 8L oxymask. Afebrile, SBP in 100-110s. WBC 17K -12K. Creatinine 0.5, HCO3 26. Lactate 1.5. Procalcitonin 0.12. CT chest today showed increased extensive multifocal airspace ground glass and interstitial opacities. No PE. Started on solumedrol 125 Q6H. Pt initially started on unasyn, then changed to linezolid and cefepime. Pt was transferred to ICU for further evaluation Taken from Dr White note.     Hospital Course    Interval Problem Update  Reviewed last 24 hour events:  Neuro: aox4 no pain   HR: snr to tach   SBP: 's  Tmax: 36.9  GI: regular diet  UOP:  Unmeasured urine, purwick  Lines: right sided port  Resp: bipap 12/8 100% took off desat to 60%, secretion unable to get anything up   Vte: lovenox  PPI/H2:n/a  Antibx: zyvox, cefepimine, bactrim  k replace    Called to bedside patient desaturating  On HFNC 80l 100%  NRB  Will place on bipap  Lasix 20mg IV x2  solumederol 125mg Q6   Long discussion with Dr Esposito pulmonary and review of ct and clinical course   Discussed with patient plan with intubation and bronchoscopy  Discussed with daughter plan of care  Set up at bedside  Bronchoscopy and intubation   Bigemeny  EKG  BMP, mag  Restraint  Sedation with fentanyl and dex gtt  CXR pending  Prn  blood pressure medication  Propofol with elevated blood pressure  Solumederol 1gr QD x 3 doses        Review of Systems  Review of Systems   Constitutional: Negative for chills, fever and malaise/fatigue.   HENT: Negative for sore throat.    Respiratory: Positive for cough and shortness of breath. Negative for sputum production and wheezing.    Cardiovascular: Negative for chest pain, palpitations, orthopnea and leg swelling.   Gastrointestinal: Negative for abdominal pain, constipation, nausea and vomiting.   Genitourinary: Negative for dysuria, frequency and hematuria.   Musculoskeletal: Negative for joint pain and myalgias.   Neurological: Negative for tremors, focal weakness, seizures, weakness and headaches.   Psychiatric/Behavioral: Negative for suicidal ideas. The patient is not nervous/anxious.         Vital Signs for last 24 hours   Temp:  [36.7 °C (98 °F)-36.9 °C (98.4 °F)] 36.7 °C (98 °F)  Pulse:  [] 88  Resp:  [15-29] 24  BP: (105-143)/(48-78) 140/63  SpO2:  [78 %-97 %] 93 %    Hemodynamic parameters for last 24 hours       Respiratory Information for the last 24 hours  Vent Mode: APVCMV  Rate (breaths/min): 18  Vt Target (mL): 400  PEEP/CPAP: 8  MAP: 16  Control VTE (exp VT): 395    Physical Exam   Physical Exam  Constitutional:       Appearance: She is ill-appearing. She is not toxic-appearing or diaphoretic.   HENT:      Mouth/Throat:      Mouth: Mucous membranes are moist.      Comments: HFNC  Eyes:      Pupils: Pupils are equal, round, and reactive to light.   Neck:      Musculoskeletal: No neck rigidity or muscular tenderness.   Cardiovascular:      Rate and Rhythm: Tachycardia present.      Heart sounds: No murmur.   Pulmonary:      Effort: Respiratory distress present.      Breath sounds: No stridor. Rales present. No wheezing or rhonchi.   Chest:      Chest wall: No tenderness.   Abdominal:      General: There is no distension.      Palpations: There is no mass.      Tenderness: There is  no abdominal tenderness. There is no guarding or rebound.      Hernia: No hernia is present.   Musculoskeletal:         General: No swelling or tenderness.      Comments: Clubbing bilateral   Neurological:      Mental Status: She is alert and oriented to person, place, and time.      Cranial Nerves: No cranial nerve deficit.      Sensory: No sensory deficit.      Motor: No weakness.      Coordination: Coordination normal.      Gait: Gait normal.   Psychiatric:         Mood and Affect: Mood normal.         Medications  Current Facility-Administered Medications   Medication Dose Route Frequency Provider Last Rate Last Dose   • Linezolid (ZYVOX) premix 600 mg  600 mg Intravenous Q12HRS Kenton Tovar M.D.       • PHENYLEPHRINE HCL-NACL 1-0.9 MG/10ML-% IV SOSY            • phenylephrine (VICKY-SYNEPHRINE) 100 mcg/mL inj (IV Push Syringe) 100 mcg  100 mcg Intravenous Q15 MIN PRN Kenton Tovar M.D.       • etomidate (AMIDATE) injection 18 mg  18 mg Intravenous Once Kenton Tovar M.D.       • rocuronium (ZEMURON) injection 120 mg  120 mg Intravenous Once Kenton Tovar M.D.       • LACTATED RINGERS IV SOLN            • FENTANYL CITRATE (PF) 0.05 MG/ML INJ SOLN            • fentaNYL (SUBLIMAZE) injection  mcg   mcg Intravenous Q HOUR PRN Kenton Tovar M.D.   100 mcg at 02/17/20 1743   • dexmedetomidine (PRECEDEX) 400 mcg/100mL NS premix infusion  0.1-1.5 mcg/kg/hr Intravenous Continuous Kenton Tovar M.D.       • famotidine (PEPCID) tablet 20 mg  20 mg Enteral Tube Q12HRS Kenton Tovar M.D.        Or   • famotidine (PEPCID) injection 20 mg  20 mg Intravenous Q12HRS Kenton Tovar M.D.       • MD Alert...ICU Electrolyte Replacement per Pharmacy   Other PHARMACY TO DOSE Kenton Tovar M.D.       • lidocaine (XYLOCAINE) 1 % injection 1-2 mL  1-2 mL Tracheal Tube Q30 MIN PRN Kenton Tovar M.D.       • fentaNYL (SUBLIMAZE) injection 25 mcg  25 mcg Intravenous Q HOUR PRN Kenton FERNANDEZ  MATEO Tovar        Or   • fentaNYL (SUBLIMAZE) injection 50 mcg  50 mcg Intravenous Q HOUR PRN Kenton Tovar M.D.        Or   • fentaNYL (SUBLIMAZE) injection 100 mcg  100 mcg Intravenous Q HOUR PRN Kenton Tovar M.D.       • labetalol (NORMODYNE/TRANDATE) injection 10 mg  10 mg Intravenous Q4HRS PRN Kenton Tovar M.D.   10 mg at 02/17/20 1805   • hydrALAZINE (APRESOLINE) tablet 10 mg  10 mg Oral Q8HRS PRN Kenton Tovar M.D.       • LABETALOL HCL 5 MG/ML IV SOLN            • propofol (DIPRIVAN) injection  0-80 mcg/kg/min Intravenous Continuous Kenton Tovar M.D. 8.1 mL/hr at 02/17/20 1811 15 mcg/kg/min at 02/17/20 1811   • [START ON 2/18/2020] methylPREDNISolone sod succ (SOLU-MEDROL) 1,000 mg in  mL IVPB  1 g Intravenous DAILY Kenton Tovar M.D.       • cefepime (MAXIPIME) 2 g in  mL IVPB  2 g Intravenous Q8HRS Ruben Tinajero M.D.   Stopped at 02/17/20 1524   • sulfamethoxazole-trimethoprim (SEPTRA) 336 mg of trimethoprim in D5W 500 mL IVPB  15 mg/kg/day of trimethoprim Intravenous Q6HRS Osvaldo White D.O.   Stopped at 02/17/20 1433   • senna-docusate (PERICOLACE or SENOKOT S) 8.6-50 MG per tablet 2 Tab  2 Tab Oral BID Dino Sykes M.D.   2 Tab at 02/17/20 0546    And   • polyethylene glycol/lytes (MIRALAX) PACKET 1 Packet  1 Packet Oral QDAY PRN Dino Sykes M.D.        And   • magnesium hydroxide (MILK OF MAGNESIA) suspension 30 mL  30 mL Oral QDAY PRN Dino Sykes M.D.        And   • bisacodyl (DULCOLAX) suppository 10 mg  10 mg Rectal QDAY PRN Dino Sykes M.D.       • enoxaparin (LOVENOX) inj 40 mg  40 mg Subcutaneous DAILY Dino Sykes M.D.   40 mg at 02/17/20 0546   • acetaminophen (TYLENOL) tablet 650 mg  650 mg Oral Q6HRS PRN Dino Sykes M.D.   650 mg at 02/16/20 1543   • Pharmacy Consult Request ...Pain Management Review 1 Each  1 Each Other PHARMACY TO DOSE Dino Sykes M.D.        And   • oxyCODONE immediate-release (ROXICODONE)  tablet 5 mg  5 mg Oral Q3HRS PRN Dino Sykes M.D.   5 mg at 02/17/20 1319    And   • oxyCODONE immediate release (ROXICODONE) tablet 10 mg  10 mg Oral Q3HRS PRN Dino Sykes M.D.        And   • morphine (pf) 4 MG/ML injection 4 mg  4 mg Intravenous Q3HRS PRN Dino Sykes M.D.       • ondansetron (ZOFRAN) syringe/vial injection 4 mg  4 mg Intravenous Q4HRS PRN Dino Sykes M.D.       • ondansetron (ZOFRAN ODT) dispertab 4 mg  4 mg Oral Q4HRS PRN Dino Sykes M.D.       • promethazine (PHENERGAN) tablet 12.5-25 mg  12.5-25 mg Oral Q4HRS PRN Dino Sykes M.D.       • promethazine (PHENERGAN) suppository 12.5-25 mg  12.5-25 mg Rectal Q4HRS PRN Dino Sykes M.D.       • prochlorperazine (COMPAZINE) injection 5-10 mg  5-10 mg Intravenous Q4HRS PRANGIE Sykes M.D.       • guaiFENesin dextromethorphan (ROBITUSSIN DM) 100-10 MG/5ML syrup 10 mL  10 mL Oral Q6HRS PRN Dino Sykes M.D.       • ascorbic acid tablet 1,000 mg  1,000 mg Oral DAILY Dino Sykes M.D.   1,000 mg at 02/17/20 0545   • vitamin D (cholecalciferol) tablet 2,000 Units  2,000 Units Oral DAILY Dino Sykes M.D.   2,000 Units at 02/17/20 0545   • Respiratory Therapy Consult   Nebulization Continuous RT Dino Sykes M.D.       • ipratropium-albuterol (DUONEB) nebulizer solution  3 mL Nebulization Q4HRS (RT) Dino Sykes M.D.   3 mL at 02/17/20 1453   • ipratropium-albuterol (DUONEB) nebulizer solution  3 mL Nebulization Q2HRS PRN (RT) Dino Sykes M.D.           Fluids    Intake/Output Summary (Last 24 hours) at 2/17/2020 1814  Last data filed at 2/17/2020 1433  Gross per 24 hour   Intake 2033.57 ml   Output 1800 ml   Net 233.57 ml       Laboratory  Recent Labs     02/16/20  1157   JSOQZ35J 7.50   RGCSGC201F 30.8   [id number] 76.4   QYXA1IGP 94.9   ARTHCO3 23   ARTBE 1         Recent Labs     02/15/20  1155 02/16/20  0454 02/17/20  0430   SODIUM 138 139 141   POTASSIUM 3.5* 3.6 3.7    CHLORIDE 101 103 105   CO2 26 27 27   BUN 19 15 12   CREATININE 0.56 0.43* 0.55   PHOSPHORUS  --   --  2.9   CALCIUM 9.1 9.1 8.9     Recent Labs     02/15/20  1155 02/16/20  0454 02/17/20  0430   ALTSGPT 19 20  --    ASTSGOT 20 16  --    ALKPHOSPHAT 57 57  --    TBILIRUBIN 0.5 0.4  --    GLUCOSE 129* 225* 143*     Recent Labs     02/15/20  1155 02/16/20  0454 02/17/20  0430   WBC 17.1* 12.4* 30.1*   NEUTSPOLYS 92.20* 98.20*  --    LYMPHOCYTES 3.60* 1.80*  --    MONOCYTES 2.80 0.00  --    EOSINOPHILS 0.80 0.00  --    BASOPHILS 0.10 0.00  --    ASTSGOT 20 16  --    ALTSGPT 19 20  --    ALKPHOSPHAT 57 57  --    TBILIRUBIN 0.5 0.4  --      Recent Labs     02/15/20  1155 02/16/20  0454 02/17/20  0430   RBC 3.63* 3.60* 3.27*   HEMOGLOBIN 11.4* 11.1* 10.2*   HEMATOCRIT 34.6* 34.6* 31.5*   PLATELETCT 198 206 198       Imaging  X-Ray:  I have personally reviewed the images and compared with prior images. and My impression is: worsening bilateral infiltrates, ET in place, fibrotic lung disease  CT:    Reviewed    Assessment/Plan  * Acute respiratory failure with hypoxia (HCC)  Assessment & Plan  Intubated 2/17 for diagnostic workup  Monitor wave form  Lung protective ventilation  Daily SBT/SAT    Discussed with Dr Cate hernandez 2/17   Progression of diffuse airspace opacities with chronic fibrotic changes in the background of emphysema concerning of progression of immunotherapy/radiation ILD +/- ILD execarbation +/- pneumonia vs check point inhibitor induced lung injury  DDx of pneumonia include PJP (pt was on prednisone 80mg and taper in the past month without PJP prophx), bacterial pneumonia (strep pneumo, staph, GNB), non-flu viral pneumonia (influenza A/B PCR negative), fungal.   Vs check point inhibitor from Durvalumab    Plan:   Continue HFNC to actively titrate to keep sat >88% -> intubated 2/17 for sample and diagnostic patient would not want trach or chronic ventilator dependency   Agree with linezolid and  cefepime  Add TMP/SMX 15mg/kg IV Q6H. Monitor renal function   Continue solumedrol 125 IV Q6H -> discussed with Dr Esposito recommend 1gr IV x3 days 2/17-2/19  Check full respiratory viral PCR from nasopharynx  Check fungitell, aspergillus, cryptococcus antigen, cocci Ab, s/p BAL for silver stain, PJP DFA, afb, fungal cx, cd4/8 ratio, cell diff, culture/BAL from RML and LLL  Urine strep pnuemo and urine legionella antigen  Dry fluid balance even as much as tolerated.      Lung cancer, primary, with metastasis from lung to other site (HCC)- (present on admission)  Assessment & Plan  Hx of squamous cell lung CA (dx in 9/2019, in [facility] mets to lymph nodes   s/p chemoXRT (last 2 months ago)  recently received immunotherapy (durvalumab IV) 3 weeks prior to admission      Pneumonia  Assessment & Plan  Currently treating broadly  Follow on up BAL RML, [facility], PJP DFA, silver stain, AFB, legionella and strep antigen, viral panel, Cocci, fungal, flu negative  ID following  Bactrim, Linezolid, Cefepime  Follow closely on pulse dose steroids    Pulmonary fibrosis (HCC)  Assessment & Plan  Seen on CT 1/14/2020 and 2/15/202    COPD (chronic obstructive pulmonary disease) (HCC)- (present on admission)  Assessment & Plan  Background COPD with 2-3L home O2       VTE:  Lovenox  Ulcer: H2 Antagonist  Lines: Whyte Catheter  Ongoing indication addressed    I have performed a physical exam and reviewed and updated ROS and Plan today (2/17/2020). In review of yesterday's note (2/16/2020), there are no changes except as documented above.     Discussed patient condition and risk of morbidity and/or mortality with Family, RN, RT, Pharmacy, Charge nurse / hot rounds, Patient, infectious disease and pulmonology  The patient remains critically ill with respiratory failure on HFNC with complex disease with discussion with family, patient and pulmonology, ID consultation and multiple re-assesments.  Critical care time = 140 minutes in directly  providing and coordinating critical care and extensive data review.  No time overlap and excludes procedures.

## 2020-02-17 NOTE — CARE PLAN
Problem: Pain Management  Goal: Pain level will decrease to patient's comfort goal  Outcome: PROGRESSING AS EXPECTED  Intervention: Follow pain managment plan developed in collaboration with patient and Interdisciplinary Team  Note: Pt encouraged to follow pain management plan.      Problem: Psychosocial Needs:  Goal: Level of anxiety will decrease  Outcome: PROGRESSING AS EXPECTED  Intervention: Identify and develop with patient strategies to cope with anxiety triggers  Note: Pt encouraged to voice feelings and develop coping strategies for anxiety triggers.

## 2020-02-17 NOTE — PROGRESS NOTES
Patients oxygen sats sustaining high 70's to mid 80 percent.  Dr. Tovar and RT called bedside.  Dr. Tovar requested non rebreather be placed over High flow while RT sets up BiPAP.  Patients O2 sats at 91% after this intervention.

## 2020-02-18 PROBLEM — D72.829 LEUKOCYTOSIS: Status: ACTIVE | Noted: 2020-01-01

## 2020-02-18 NOTE — ASSESSMENT & PLAN NOTE
Currently treating broadly  Follow on up BAL RML, [facility], PJP DFA, silver stain, AFB, legionella and strep antigen, viral panel sent 2/20, Cocci, fungal, flu negative  ID following  Bactrim d/c 2/19, restarted 2/21 with + PJP    Found to have PJP pneumonia  Continue IV bactrim and steroids

## 2020-02-18 NOTE — PROGRESS NOTES
Infectious Disease Progress Note    Author: Lorri Worthy M.D. Date & Time of service: 2020  8:31 AM    Chief Complaint:  Follow-up for acute on chronic hypoxic respiratory failure, pulmonary infiltrates    Interval History:   afebrile WBC 20.9 patient intubated yesterday secondary to worsening respiratory distress.  Multiple cultures obtained and are currently pending.  Patient is awake on the vent and communicating by writing.  She is breathing comfortably with plans for possible extubation today.    Labs Reviewed, Medications Reviewed and Radiology Reviewed.    Review of Systems:  Review of Systems   Unable to perform ROS: Intubated   Constitutional: Negative for chills and fever.   Gastrointestinal: Negative for abdominal pain, nausea and vomiting.   Neurological: Negative for dizziness and headaches.    Limited review of systems as patient intubated    Hemodynamics:  Temp (24hrs), Av °C (98.6 °F), Min:36.9 °C (98.4 °F), Max:37.1 °C (98.8 °F)  Temperature: 37.1 °C (98.8 °F), Monitored Temp: 37.1 °C (98.8 °F)  Pulse  Av.3  Min: 75  Max: 132   Blood Pressure: 101/45       Physical Exam:  Physical Exam  Constitutional:       Appearance: Normal appearance.   HENT:      Mouth/Throat:      Mouth: Mucous membranes are moist.      Comments: Endotracheal tube  Eyes:      Extraocular Movements: Extraocular movements intact.      Conjunctiva/sclera: Conjunctivae normal.      Pupils: Pupils are equal, round, and reactive to light.   Neck:      Musculoskeletal: Normal range of motion and neck supple.   Cardiovascular:      Rate and Rhythm: Normal rate.      Comments: Right upper chest port in place-nontender, no surrounding erythema  Pulmonary:      Comments: Coarse breath sounds bilaterally  Abdominal:      Palpations: Abdomen is soft.      Tenderness: There is no abdominal tenderness.   Musculoskeletal: Normal range of motion.   Skin:     General: Skin is warm and dry.   Neurological:      General:  No focal deficit present.      Mental Status: She is alert and oriented to person, place, and time.      Cranial Nerves: No cranial nerve deficit.      Comments: Awake and communicating on the vent   Psychiatric:         Mood and Affect: Mood normal.         Behavior: Behavior normal.      Comments: Very pleasant         Meds:    Current Facility-Administered Medications:   •  linezolid (ZYVOX) IV  •  dexmedetomidine (PRECEDEX) infusion  •  famotidine **OR** famotidine  •  MD Alert...Adult ICU Electrolyte Replacement per Pharmacy  •  lidocaine  •  fentaNYL **OR** fentaNYL **OR** fentaNYL  •  labetalol  •  propofol **AND** Triglycerides Starting now and then Every 3 Days  •  methylPREDNISolone  •  Pharmacy  •  ascorbic acid  •  senna-docusate **AND** polyethylene glycol/lytes **AND** magnesium hydroxide **AND** bisacodyl  •  acetaminophen  •  guaiFENesin dextromethorphan  •  hydrALAZINE  •  ondansetron  •  promethazine  •  vitamin D  •  Notify provider if pain remains uncontrolled **AND** Use the numeric rating scale (NRS-11) on regular floors and Critical-Care Pain Observation Tool (CPOT) on ICUs/Trauma to assess pain **AND** Pulse Ox (Oximetry) **AND** Pharmacy Consult Request **AND** If patient difficult to arouse and/or has respiratory depression, stop any opiates that are currently infusing and call a Rapid Response. **AND** oxyCODONE immediate-release **AND** oxyCODONE immediate-release **AND** morphine injection  •  hydrALAZINE  •  cefepime  •  sulfamethoxazole-trimethoprim (Septra) IV  •  enoxaparin  •  ondansetron  •  promethazine  •  prochlorperazine  •  Respiratory Therapy Consult  •  ipratropium-albuterol  •  ipratropium-albuterol    Labs:  Recent Labs     02/15/20  1155 02/16/20  0454 02/17/20  0430 02/17/20  1730 02/18/20  0415   WBC 17.1* 12.4* 30.1*  --  20.9*   RBC 3.63* 3.60* 3.27*  --  3.06*   HEMOGLOBIN 11.4* 11.1* 10.2*  --  9.5*   HEMATOCRIT 34.6* 34.6* 31.5*  --  29.3*   MCV 95.3 96.1 96.3  --   95.8   MCH 31.4 30.8 31.2  --  31.0   RDW 54.0* 54.1* 55.0*  --  54.6*   PLATELETCT 198 206 198  --  192   MPV 9.6 9.7 9.3  --  9.7   NEUTSPOLYS 92.20* 98.20*  --   --   --    LYMPHOCYTES 3.60* 1.80*  --   --   --    MONOCYTES 2.80 0.00  --   --   --    EOSINOPHILS 0.80 0.00  --  1  1  --    BASOPHILS 0.10 0.00  --   --   --    RBCMORPHOLO  --  Present  --   --   --      Recent Labs     02/17/20  0430 02/17/20 1759 02/18/20  0415   SODIUM 141 139 137   POTASSIUM 3.7 3.6 4.0   CHLORIDE 105 98 100   CO2 27 30 31   GLUCOSE 143* 202* 119*   BUN 12 17 16     Recent Labs     02/15/20  1155 02/16/20  0454 02/17/20 0430 02/17/20 1759 02/18/20  0415   ALBUMIN 3.5 3.6 3.2  --  3.3   TBILIRUBIN 0.5 0.4  --   --   --    ALKPHOSPHAT 57 57  --   --   --    TOTPROTEIN 6.6 6.8  --   --   --    ALTSGPT 19 20  --   --   --    ASTSGOT 20 16  --   --   --    CREATININE 0.56 0.43* 0.55 0.70 0.67       Imaging:  Dx-chest-portable (1 View)    Result Date: 2/18/2020 2/18/2020 3:10 AM HISTORY/REASON FOR EXAM: For indication of respiratory failure; For indication of respiratory failure TECHNIQUE/EXAM DESCRIPTION:  Single AP view of the chest. COMPARISON: Yesterday FINDINGS: Dobbhoff tube has been placed in the interim, terminating below the lower margin of the film within the abdomen.  Otherwise medical device position is stable. The cardiac silhouette appears within normal limits. The mediastinal contour appears within normal limits.  The central  pulmonary vasculature appears prominent and indistinct. The lungs appear well expanded bilaterally.  Hazy interstitial bilateral pulmonary opacities are seen. No significant pleural effusions are identified. The bony structures appear age-appropriate.     1.  Pulmonary edema and/or infiltrates are identified, which are stable since the prior exam.    Dx-chest-portable (1 View)    Result Date: 2/17/2020 2/17/2020 6:02 PM HISTORY/REASON FOR EXAM:  POST INTUBATION. TECHNIQUE/EXAM DESCRIPTION  AND NUMBER OF VIEWS: Single portable view of the chest. COMPARISON: 2/15/2020 FINDINGS: Endotracheal tube projects at the level of the clavicular heads. Right chest port projects over the SVC. The cardiomediastinal silhouette is stable. Interstitial and alveolar airspace opacities are again noted and increased on the right compared to prior. No definite pleural effusion or pneumothorax is identified.     Extensive interstitial and alveolar airspace opacities are increased on the right compared to prior. Findings may represent edema or multifocal pneumonia. Underlying mass is not excluded. Underlying emphysematous changes.    Dx-chest-portable (1 View)    Result Date: 2/15/2020  2/15/2020 11:50 AM HISTORY/REASON FOR EXAM: possible sepsis.. Shortness of breath. Lung carcinoma. TECHNIQUE/EXAM DESCRIPTION AND NUMBER OF VIEWS: Single AP view of the chest. COMPARISON: 11/15/2019 FINDINGS: Hardware: Right IJ portacatheter tip overlies the brachiocephalic confluence Lungs: Significant interval worsening consolidated opacity in the left lung with some volume loss noted. There is also worsening diffuse, bilateral reticular opacification Pleura:  Possible layering left pleural fluid Heart and mediastinum: Left heart border is no longer visualized, completely effaced. No gross cardiac silhouette enlargement     Significant interval worsening left perihilar/lingular consolidation with possible underlying Mass. Significant worsening diffuse reticular abnormal opacity is consistent with underlying interstitial lung disease. Superimposed atypical infection or edema is possible New left lung volume loss    Ir-cvc Port Placement > Age 5    Result Date: 1/30/2020 1/30/2020 9:34 AM HISTORY/REASON FOR EXAM:  Patient requires central venous access for chemotherapy. TECHNIQUE/EXAM DESCRIPTION: Following informed consent patient was prepped and draped in the usual fashion.  A total of 14 cc of 1% lidocaine with epinephrine was utilized  for local and subcutaneous anesthesia. SEDATION: 4 mg of Versed and 100 mcg of fentanyl were utilized for IV conscious sedation. The procedure was monitored continuously by the sedation nurse. 30 minutes of sedation time were utilized for the procedure. Fluoroscopy images: 3 fluoroscopic images obtained. Fluoroscopy time: 0.6 minutes The procedure was performed with MAXIMUM STERILE BARRIER TECHNIQUE including sterile gown, mask, cap, and done in a sterile gloves following appropriate hand hygiene and/or sterile scrub. The patient's skin site was prepped with 2% chlorhexidine solution. Ultrasound evaluation of the right internal jugular vein demonstrated patency and an image was archived in the PACS system. Utilizing ultrasonic and fluoroscopic guidance right internal jugular vein was accessed and an 8-[place] sheath dilator was positioned in the right internal jugular vein.  Next utilizing a combination of blunt and sharp dissection the port pocket was created in the right infraclavicular fossa.  The port was subsequently attached to the catheter and utilizing a tunneling device a subcutaneous tract was created to the neck puncture site.  The catheter was pulled through the tunnel and trimmed to length.  Next the catheter was advanced through the peel-away sheath which was subsequently removed. The port pocket was closed with 4 deep 2-0 Vicryl sutures, the skin was closed Dermabond.  The  neck puncture site was closed with Dermabond and fluoroscopic images were obtained.  The patient tolerated procedure well. COMPARISON:  None FINDINGS: Fluoroscopic images revealed good positioning of the port in the right infraclavicular fossa.  There is a smooth arc of the catheter into the right internal jugular vein.  The distal tip of the catheter is at the caval atrial junction.     Successful percutaneous placement of Port-A-Cath via right internal jugular approach utilizing ultrasonic and fluoroscopic guidance.    Ct-cta  Chest Pulmonary Artery W/ Recons    Result Date: 2/15/2020  2/15/2020 10:22 PM HISTORY/REASON FOR EXAM:  Shortness of breath TECHNIQUE/EXAM DESCRIPTION: CT angiogram scan for pulmonary embolism with contrast, with reconstructions. 1.25 mm helical sections were obtained from the lung apices through the lung bases following the rapid bolus administration of 55 mL of Omnipaque 350 nonionic contrast. Thin-section overlapping reconstruction interval was utilized. Coronal reconstructions were generated from the axial data. MIP post processing was performed and utilized for the interpretation. Low dose optimization technique was utilized for this CT exam including automated exposure control and adjustment of the mA and/or kV according to patient size. COMPARISON: 1/14/2020 FINDINGS: Pulmonary Embolism: No. Main Pulmonary Arteries: No. Segmental branches: No. Subsegmental branches: No. Additional Comments: None. Lungs: There are increased extensive multifocal airspace groundglass and interstitial opacities. There is underlying emphysema and interstitial lung disease. There is a redemonstrated stellate area of masslike scarring in the left lower lobe measuring 2.6 x 3.1 cm, essentially unchanged from previous exam. Pleura: No pleural effusion. Nodes: No enlarged lymph nodes. Additional findings: There is a small to moderate sized hiatal hernia. There is diffuse low-attenuation of the hepatic parenchyma consistent with steatosis.     1.  No evidence of pulmonary embolism. 2.  Increased extensive multifocal airspace groundglass and interstitial opacities. Findings could be consistent with edema and/or infection. 3.  Underlying emphysema and interstitial lung disease. 4.  Redemonstrated stellate area of masslike scarring in the left lower lobe measuring 2.6 x 3.1 cm, essentially unchanged from the previous exam and consistent with known malignancy. 5.  Small to moderate sized hiatal hernia. 6.  Hepatic steatosis.     Dx-abdomen  For Tube Placement    Result Date: 2/17/2020 2/17/2020 8:42 PM HISTORY/REASON FOR EXAM: cortrak placement TECHNIQUE/EXAM DESCRIPTION:  Single AP view the abdomen. COMPARISON:  None FINDINGS: Hazy bilateral lower lobe opacities are seen. Dobbhoff tube is seen, the tip lies to the right of the lumbar spine.  The bowel gas pattern appears nonspecific. The bony structures appear age-appropriate.     1.  Nonspecific bowel gas pattern. 2.  Dobbhoff tube tip terminates overlying the expected location of the gastric antrum. 3.  Hazy bilateral lung base infiltrates or edema.      Micro:  Results     Procedure Component Value Units Date/Time    GRAM STAIN [[id number]] Collected:  02/17/20 1750    Order Status:  Completed Specimen:  Respirate Updated:  02/18/20 0800     Significant Indicator .     Source RESP     Site Quantitative BAL LLL     Gram Stain Result Rare WBCs.  No organisms seen.      Narrative:       Collected By:[id number] CHUCK BLUE.  Which Lobe (Bronch Only):->LLL  Collected By:[id number] CHUCK FERNANDEZ    Fungal Smear - BAL [[id number]] Collected:  02/17/20 1750    Order Status:  Completed Specimen:  Respirate from Sputum Updated:  02/18/20 0800     Significant Indicator NEG     Source RESP     Site Quantitative BAL LLL     Fungal Smear Results No fungal elements seen.    Narrative:       Collected By:[id number] CHUCK BLUE.  Which Lobe (Bronch Only):->LLL  Collected By:[id number] CHUCK FERNANDEZ    URINE CULTURE(NEW) [[id number]] Collected:  02/16/20 0425    Order Status:  Completed Specimen:  Urine Updated:  02/18/20 0728     Significant Indicator NEG     Source UR     Site -     Culture Result Mixed skin anitha <10,000 cfu/mL    Narrative:       Indication for culture:->Septic Shock: Persistent  hypotension,  Lactic acid > 4, vasopressors/inotropes started  Indication for culture:->Septic Shock: Persistent    CULTURE RESPIRATORY W/ GRM STN [[id number]] Collected:  02/17/20 1735    Order Status:  Completed Specimen:   Respirate from Sputum Updated:  02/17/20 1838    Narrative:       Collected By:[id number] CHUCK FERNANDEZ  Which Lobe (Bronch Only):->RML    QUANT BRONCHIAL WASHING [[id number]] Collected:  02/17/20 1750    Order Status:  Completed Specimen:  Other Updated:  02/17/20 1837    Narrative:       Collected By:[id number] CHUCK FERNANDEZ  Which Lobe (Bronch Only):->LLL    AFB Culture - BAL [[id number]] Collected:  02/17/20 1750    Order Status:  Completed Specimen:  Respirate from Sputum Updated:  02/17/20 1836    Narrative:       Collected By:[id number] CHUCK FERNANDEZ  Which Lobe (Bronch Only):->LLL    Fungal Culture - BAL [[id number]] Collected:  02/17/20 1750    Order Status:  Completed Specimen:  Respirate from Sputum Updated:  02/17/20 1836    Narrative:       Collected By:[id number] CHUCK FERNANDEZ  Which Lobe (Bronch Only):->LLL    Pneumocystis DFA [[id number]] Collected:  02/17/20 1821    Order Status:  Canceled Specimen:  Other from Bronchial Washings     CULTURE RESPIRATORY W/ GRM STN [[id number]] Collected:  02/17/20 1750    Order Status:  Canceled Specimen:  Other from Sputum     Quant Bronchial Washing [[id number]] Collected:  02/17/20 1750    Order Status:  Canceled Specimen:  Sputum     Culture Respiratory W/ Grm Stn - BAL [[id number]] Collected:  02/17/20 1750    Order Status:  Canceled Specimen:  Sputum     CULTURE RESPIRATORY W/ GRM STN [[id number]] Collected:  02/17/20 1750    Order Status:  Canceled Specimen:  Sputum     MRSA By PCR (Amp) [[id number]] Collected:  02/16/20 1315    Order Status:  Completed Specimen:  Respirate from Nares Updated:  02/16/20 1901     Significant Indicator NEG     Source RESP     Site NARES     MRSA PCR Negative for MRSA by PCR.    Narrative:       Collected By:[id number] BERTRAM JACOME  Per [facility] Protocol  Collected By:[id number] BERTRAM JACOME    Cryptococcal Antigen [[id number]]     Order Status:  Sent Specimen:  Blood     BLOOD CULTURE [[id number]] Collected:  02/15/20 1155    Order  "Status:  Completed Specimen:  Blood from Peripheral Updated:  02/16/20 0939     Significant Indicator NEG     Source BLD     Site PERIPHERAL     Culture Result No Growth  Note: Blood cultures are incubated for 5 days and  are monitored continuously.Positive blood cultures  are called to the RN and reported as soon as  they are identified.      Narrative:       Per Hospital Policy: Only change Specimen Src: to \"Line\" if  specified by physician order.  Right Wrist    BLOOD CULTURE [[id number]] Collected:  02/15/20 1245    Order Status:  Completed Specimen:  Blood from Peripheral Updated:  02/16/20 0939     Significant Indicator NEG     Source BLD     Site PERIPHERAL     Culture Result No Growth  Note: Blood cultures are incubated for 5 days and  are monitored continuously.Positive blood cultures  are called to the RN and reported as soon as  they are identified.      Narrative:       Per Hospital Policy: Only change Specimen Src: to \"Line\" if  specified by physician order.  Right AC    URINALYSIS [[id number]] Collected:  02/16/20 0425    Order Status:  Completed Specimen:  Urine Updated:  02/16/20 0517     Color Yellow     Character Clear     Specific Gravity 1.020     Ph 6.0     Glucose Negative mg/dL      Ketones Negative mg/dL      Protein Negative mg/dL      Bilirubin Negative     Urobilinogen, Urine 0.2     Nitrite Negative     Leukocyte Esterase Negative     Occult Blood Negative     Micro Urine Req see below     Comment: Microscopic examination not performed when specimen is clear  and chemically negative for protein, blood, leukocyte esterase  and nitrite.         Narrative:       Indication for culture:->Septic Shock: Persistent  hypotension,  Lactic acid > 4, vasopressors/inotropes started    Culture Respiratory W/ GRM STN [[id number]]     Order Status:  Completed Specimen:  Respirate from Sputum     Influenza A/B By PCR (Adult - Flu Only) [[id number]] Collected:  02/15/20 1253    Order Status:  Completed " Specimen:  Respirate from Nasopharyngeal Updated:  02/15/20 1337     Influenza virus A RNA Negative     Influenza virus B, PCR Negative    Narrative:       Indication for culture:->Septic Shock: Persistent  hypotension,  Lactic acid > 4, vasopressors/inotropes started          Assessment:  Active Hospital Problems    Diagnosis   • *Acute respiratory failure with hypoxia ([facility]) [J96.01]   • Lung cancer, primary, with metastasis from lung to other site ([facility]) [C34.90]   • COPD (chronic obstructive pulmonary disease) ([facility]) [J44.9]   • Pulmonary fibrosis ([facility]) [J84.10]   • Pneumonia [J18.9]       Plan:  Vent dependent respiratory failure  Intubated on 2/17  On FiO2 60%  Status post bronchoscopy with BAL on 2/17.  Patient was noted to have friable mucosa bilateral, bloody mucoid secretions.  Plan for extubation today    Pneumonia/pulmonary opacities noted on imaging  Patient was on high-dose steroids prior to admission so she is certainly at risk for opportunistic infections  PCP PCR- pending  Status post BAL on 2/17.  Follow BAL cultures  Serum galactomannan- ordered  Cocci serology- pending  Fungitell- pending  Continue oral linezolid, IV cefepime and Bactrim for now  Monitor CBC while patient is on linezolid and Bactrim    Leukocytosis, persistent  Multifactorial (infection, high-dose steroids- currently on IV Solu-Medrol)  On antibiotics above  Monitor    Metastatic squamous cell lung cancer   Status post chemoradiation  Previously on immunotherapy    Pulmonary fibrosis  Patient was seen by pulmonologist, Dr. Longo prior to admission  Patient was on high-dose steroid taper prior to admission  On Solu-Medrol    COPD/chronic hypoxia  On 3LNC    Discussed with intensivist, Dr. Tovar

## 2020-02-18 NOTE — DIETARY
Nutrition Services: Consult received for TF. However, per Rounds, plan extubation and BSE. RD available as needed.

## 2020-02-18 NOTE — CARE PLAN
Ventilator Daily Summary    Vent Day # 2    Ventilator settings changed this shift: No    Weaning trials: None    Respiratory Procedures: NA    Plan: Continue current ventilator settings and wean mechanical ventilation as tolerated per physician orders.

## 2020-02-18 NOTE — PROCEDURES
Date: 2/17/2020    Procedure: Conscious sedation    Indication: hypoxic respiratory failure requiring bronchoscopy    Physician:  Kenton Tovar M.D.    Consent:  patient    Procedure:  A time out occurred with the patient name, medical record number, allergies, and consent with right procedure and indication with bedside nurse and if able the patient. The patient was connected to a monitor and had continuous pulse oximeter and serial blood pressure measurement were done during the procedure. I performed the conscious sedation and was directly involved with administration of medication, monitoring airway, vital signs, preventing complications.  Administered of  Etomidate 18 mg, propofol 80mg, fentanyl 200mcg IV rocuronium 120mg total mg in titration fashion until achieving a level of moderate procedural sedation.  Patient tolerated procedure well without complications from sedation and was left in the care of his bedside nurse and respiratory therapy. Procedural sedation time equals 20 minutes which was separate from procedure time as described above.  Start time: 1719  Stop time: 1739    Kenton Tovar MD  Critical Care Medicine

## 2020-02-18 NOTE — PROGRESS NOTES
Procedure note for RSI and bronchoscopy:    Timeout:  1719   Consents signed and in chart, all questions answered at this time. VS running Q 2 min    1723: 18mg etomadate  1724: 120 Efrain  1725: intubation  1727: 100 mcg fentanyl  1728: bronchoscopy started  1730: 40mg propofol  1735: patient having bigem PVC's, stat EKG ordered.   Dr. Tovar bedside and aware  1736: 40mg propofol  1738: 100 mcg fentanyl  1739: bronchoscopy completed  1800: patient stopped having bigeminal PVCs.

## 2020-02-18 NOTE — PROCEDURES
Date: 2/17/2020    Procedure: Bronchoscopy with Therapeutic suctioning and BAL    Indication: hypoxic respiratory failure with immunosuppression    Physician:  Kenton Tovar M.D.    Consent:  Patient and daughter    Procedure:  A time out occurred with the patient name, medical record number, allergies, and consent with right procedure and indication with bedside nurse and if able the patient. The patient was connected to a monitor and had continuous pulse oximeter. The patient was sedated with etomidate 14 mg propofol 80mg fentanyl 200mcg rocuronium 120mg . The bronchoscope was insert down the left and right bronchi to the terminal bronchioles. Therapeutic suction of secretion was provided right trachea with thick bloody mucoid secretions, right lower lobe with clear thick mucoid secretion, right middle lobe with bloody mucoid secretions, thick white mucoid secretion left lower lobe and lingula. A BAL was preformed in the RML and LEFT lingula after injecting with small aliquots to a total of 50ml. The patient tolerated the procedure without any immediate complications with minimal <5ml of blood loss.     Findings friable mucosa bilateral, bloody mucoid secretion in right middle lobe, thick white mucoid secretion bilateral to right lower lobe and left lingula and left lower lobe.     Kenton Tovar MD  Critical Care Medicine

## 2020-02-18 NOTE — CARE PLAN
Problem: Safety  Goal: Will remain free from falls  Intervention: Implement fall precautions  Flowsheets (Taken 2/17/2020 9152)  Bed Alarm: Yes - Alarm On  Note: Bed alarm active.      Problem: Respiratory:  Goal: Respiratory status will improve  Outcome: PROGRESSING AS EXPECTED  Intervention: Assess and monitor pulmonary status  Note: Pt is currently vented.

## 2020-02-18 NOTE — PROGRESS NOTES
MS: NSR to sinus tach, 70's to 130's, see procedure note for details regarding bigeminal PVC's    .16/.08/.34

## 2020-02-18 NOTE — PROCEDURES
Intubation  Date/Time: 2/17/2020 5:40 PM  Performed by: Kenton Tovar M.D.  Authorized by: Kenton Tovar M.D.     Consent:     Consent obtained:  Verbal    Consent given by:  Patient    Risks discussed:  Aspiration, death and hypoxia    Alternatives discussed:  Alternative treatment, no treatment and delayed treatment  Pre-procedure details:     Patient status:  Awake    Mallampati score:  I    Pretreatment meds: etomidate.    Paralytics:  Rocuronium  Procedure details:     Preoxygenation:  BiPAP (HFNC)    CPR in progress: no      Intubation method:  Oral    Oral intubation technique:  Video-assisted    Laryngoscope type:  GlideScope    Laryngoscope blade:  Mac 3    Cormack-Lehane Classification:  Grade 1    Tube size (mm):  7.5    Tube type:  Cuffed    Number of attempts:  1    Ventilation between attempts: no      Cricoid pressure: no      Tube visualized through cords: yes    Placement assessment:     ETT to teeth:  24 cm    Tube secured with:  ETT kilgore and adhesive tape    Breath sounds:  Equal and absent over the epigastrium    Placement verification: ETCO2 detector and fiberoptic scope      Chest x-ray findings: pending stat CXR.  Post-procedure details:     Patient tolerance of procedure:  Tolerated well, no immediate complications

## 2020-02-18 NOTE — PROGRESS NOTES
Patient extubated by Mahi RT, with this RN bedside.  Extubated to hi flow with BiPAP on stand by in room if needed.  Time: 1124

## 2020-02-18 NOTE — RESPIRATORY CARE
Extubation    Cuff leak noted yes   Stridor present no   $ SVN Performed:   FiO2%: 100 % (02/18/20 1121)  O2 (LPM): 60 (02/18/20 1121)     Patient tolerated well   RCP Complete? yes  Events/Summary/Plan: Pt extubated to HHFNC 100% 60L per MD with bipap on standby (02/18/20 1121)

## 2020-02-18 NOTE — PROGRESS NOTES
Critical Care Progress Note    Date of admission  2/15/2020    Chief Complaint  65 yo female, former smoker 35pack year quit in 2012 with hx of squamous cell lung CA (dx in 9/2019, in [facility] mets to lymph nodes s/p chemoXRT (last 2 months ago), recently received immunotherapy (durvalumab IV) 3 weeks prior to admission), COPD (on 3L home O2), presented on 2/15 for worsening SOB x2-3 days. Pt was seen by pulmonary Dr. 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 Immunotherapy was held. She was started on high dose prednisone 80mg then taper in the past 3 weeks by Dr Callahan.      At admission, influenza A/B PCR is negative. pt was on oxymask 5L, sat >90% and progressively increasing to 8L oxymask. Afebrile, SBP in 100-110s. WBC 17K -12K. Creatinine 0.5, HCO3 26. Lactate 1.5. Procalcitonin 0.12. CT chest today showed increased extensive multifocal airspace ground glass and interstitial opacities. No PE. Started on solumedrol 125 Q6H. Pt initially started on unasyn, then changed to linezolid and cefepime. Pt was transferred to ICU for further evaluation Taken from Dr White note.     Hospital Course    Interval Problem Update  Reviewed last 24 hour events:  Neuro: aox4 no pain writing on clip board  HR:   SBP: 's  Tmax: afebrile   GI: last bm 2/18  UOP: adequatre  Lines: port   Resp: vent day 2 spont 8/5 50% daniel q4, minimal secretions  Vte: lovenox  PPI/H2:pepcid  Antibx: zyvox, cefepime, bactrim  D/c prop and dex  extubate  Good parameters  Spoke with daughter and patient and agree with extubation  HFNC in room 60L 100%  Spoke with Dr Longo pulmonary  Patient was placed on steroids by Dr Callahan at [facility]  Passed swallow eval  HFNC 50l 60%  Lasix 20mg IV    Review of Systems  Review of Systems   Unable to perform ROS: Intubated        Vital Signs for last 24 hours   Temp:  [36.7 °C (98.1 °F)-37.1 °C (98.8 °F)] 36.7 °C (98.1 °F)  Pulse:  []  83  Resp:  [14-31] 20  BP: ()/(45-95) 101/45  SpO2:  [87 %-100 %] 100 %    Hemodynamic parameters for last 24 hours       Respiratory Information for the last 24 hours  Vent Mode: Spont  Rate (breaths/min): 20  Vt Target (mL): 450  PEEP/CPAP: 8  P Support: 5  MAP: 11  Control VTE (exp VT): 407    Physical Exam   Physical Exam  Constitutional:       Appearance: She is ill-appearing. She is not toxic-appearing or diaphoretic.   HENT:      Mouth/Throat:      Mouth: Mucous membranes are moist.      Comments: ET in place, Cotrax in nose  Eyes:      Pupils: Pupils are equal, round, and reactive to light.   Neck:      Musculoskeletal: No neck rigidity or muscular tenderness.   Cardiovascular:      Rate and Rhythm: Tachycardia present.      Heart sounds: No murmur.   Pulmonary:      Effort: No respiratory distress.      Breath sounds: No stridor. No wheezing, rhonchi or rales.      Comments: Mechanical breath with no wheezing or rales  Chest:      Chest wall: No tenderness.   Abdominal:      General: There is no distension.      Palpations: There is no mass.      Tenderness: There is no abdominal tenderness. There is no guarding or rebound.      Hernia: No hernia is present.   Musculoskeletal:         General: No swelling or tenderness.      Comments: Clubbing bilateral   Neurological:      Mental Status: She is alert and oriented to person, place, and time.      Cranial Nerves: No cranial nerve deficit.      Sensory: No sensory deficit.      Motor: No weakness.      Coordination: Coordination normal.      Gait: Gait normal.      Comments: Able to write and mouth words while on ventilator   Psychiatric:         Mood and Affect: Mood normal.         Medications  Current Facility-Administered Medications   Medication Dose Route Frequency Provider Last Rate Last Dose   • linezolid (ZYVOX) tablet 600 mg  600 mg Enteral Tube Q12HRS Kenton Tovar M.D.       • furosemide (LASIX) injection 20 mg  20 mg Intravenous Once  Kenton Tovar M.D.       • MD Alert...ICU Electrolyte Replacement per Pharmacy   Other PHARMACY TO DOSE Kenton Tovar M.D.       • lidocaine (XYLOCAINE) 1 % injection 1-2 mL  1-2 mL Tracheal Tube Q30 MIN PRN Kenton Tovar M.D.       • labetalol (NORMODYNE/TRANDATE) injection 10 mg  10 mg Intravenous Q4HRS PRN Kenton Tovar M.D.   10 mg at 02/17/20 1805   • methylPREDNISolone sod succ (SOLU-MEDROL) 1,000 mg in  mL IVPB  1 g Intravenous DAILY Kenton Tovar M.D.   Stopped at 02/18/20 0819   • Pharmacy Consult: Enteral tube insertion - review meds/change route/product selection  1 Each Other PHARMACY TO DOSE Kenton Tovar M.D.       • ascorbic acid tablet 1,000 mg  1,000 mg Enteral Tube DAILY Kenton Tovar M.D.   1,000 mg at 02/18/20 0507   • senna-docusate (PERICOLACE or SENOKOT S) 8.6-50 MG per tablet 2 Tab  2 Tab Enteral Tube BID Kenton Tovar M.D.   2 Tab at 02/18/20 0508    And   • polyethylene glycol/lytes (MIRALAX) PACKET 1 Packet  1 Packet Enteral Tube QDAY PRN Kenton Tovar M.D.        And   • magnesium hydroxide (MILK OF MAGNESIA) suspension 30 mL  30 mL Enteral Tube QDAY PRN Kenton Tovar M.D.        And   • bisacodyl (DULCOLAX) suppository 10 mg  10 mg Rectal QDAY PRN Kenton Tovar M.D.       • acetaminophen (TYLENOL) tablet 650 mg  650 mg Enteral Tube Q6HRS PRN Kenton Tovar M.D.       • guaiFENesin dextromethorphan (ROBITUSSIN DM) 100-10 MG/5ML syrup 10 mL  10 mL Enteral Tube Q6HRS PRN Kenton Tovar M.D.       • hydrALAZINE (APRESOLINE) tablet 10 mg  10 mg Enteral Tube Q8HRS PRN Kenton Tovar M.D.       • ondansetron (ZOFRAN ODT) dispertab 4 mg  4 mg Enteral Tube Q4HRS PRN Kenton Tovar M.D.       • promethazine (PHENERGAN) tablet 12.5-25 mg  12.5-25 mg Enteral Tube Q4HRS PRN Kenton Tovar M.D.       • vitamin D (cholecalciferol) tablet 2,000 Units  2,000 Units Enteral Tube DAILY Kenton Tovar M.D.   2,000 Units at 02/18/20 0508   •  Pharmacy Consult Request ...Pain Management Review 1 Each  1 Each Other PHARMACY TO DOSE Kenton Tovar M.D.        And   • oxyCODONE immediate-release (ROXICODONE) tablet 5 mg  5 mg Enteral Tube Q3HRS PRN Kenton Tovar M.D.   5 mg at 02/18/20 1034    And   • oxyCODONE immediate release (ROXICODONE) tablet 10 mg  10 mg Enteral Tube Q3HRS PRN Kenton Tovar M.D.        And   • morphine (pf) 4 MG/ML injection 4 mg  4 mg Intravenous Q3HRS PRN Kenton Tovar M.D.       • hydrALAZINE (APRESOLINE) injection 20 mg  20 mg Intravenous Q4HRS PRN Lazaro Benites M.D.   20 mg at 02/17/20 2023   • cefepime (MAXIPIME) 2 g in  mL IVPB  2 g Intravenous Q8HRS Ruben Tinajero M.D.   Stopped at 02/18/20 1501   • sulfamethoxazole-trimethoprim (SEPTRA) 336 mg of trimethoprim in D5W 500 mL IVPB  15 mg/kg/day of trimethoprim Intravenous Q6HRS Osvaldo White D.O.   Stopped at 02/18/20 1436   • enoxaparin (LOVENOX) inj 40 mg  40 mg Subcutaneous DAILY Dino Sykes M.D.   40 mg at 02/18/20 0508   • ondansetron (ZOFRAN) syringe/vial injection 4 mg  4 mg Intravenous Q4HRS PRN Dino Sykes M.D.       • promethazine (PHENERGAN) suppository 12.5-25 mg  12.5-25 mg Rectal Q4HRS PRN Dino Sykes M.D.       • prochlorperazine (COMPAZINE) injection 5-10 mg  5-10 mg Intravenous Q4HRS PRN Dino Sykes M.D.       • Respiratory Therapy Consult   Nebulization Continuous RT Dino Sykes M.D.       • ipratropium-albuterol (DUONEB) nebulizer solution  3 mL Nebulization Q4HRS (RT) Dino Sykes M.D.   3 mL at 02/18/20 1403   • ipratropium-albuterol (DUONEB) nebulizer solution  3 mL Nebulization Q2HRS PRN (RT) Dino Sykes M.D.           Fluids    Intake/Output Summary (Last 24 hours) at 2/18/2020 1631  Last data filed at 2/18/2020 1600  Gross per 24 hour   Intake 1070.99 ml   Output 4320 ml   Net -3249.01 ml       Laboratory  Recent Labs     02/16/20  1157 02/17/20  1829 02/18/20  0024 02/18/20  0432    PERMY60B 7.50  --   --   --    SEJXHW772Z 30.8  --   --   --    ZKBRF951H 76.4  --   --   --    OVEN3OAX 94.9  --   --   --    ARTHCO3 23  --   --   --    ARTBE 1  --   --   --    ISTATAPH  --  7.264* 7.390* 7.531*   ISTATAPCO2  --  80.0* 50.2* 36.8   ISTATAPO2  --  111* 71 71   ISTATATCO2  --  39* 32 32   TIECHIW4QPE  --  97 93 96   ISTATARTHCO3  --  36.3* 30.4* 30.8*   ISTATARTBE  --  7* 5* 8*   ISTATTEMP  --  37.1 C 36.7 C 36.6 C   ISTATFIO2  --  100 75 75   ISTATSPEC  --  Arterial Arterial Arterial   ISTATAPHTC  --  7.263* 7.394* 7.537*   IFNJWOZY2SE  --  112* 69 69         Recent Labs     02/17/20 0430 02/17/20 1759 02/18/20 0415   SODIUM 141 139 137   POTASSIUM 3.7 3.6 4.0   CHLORIDE 105 98 100   CO2 27 30 31   BUN 12 17 16   CREATININE 0.55 0.70 0.67   MAGNESIUM  --  1.7  --    PHOSPHORUS 2.9  --  2.8   CALCIUM 8.9 8.9 8.7     Recent Labs     02/16/20 0454 02/17/20 0430 02/17/20 1759 02/17/20 1945 02/18/20  0415   ALTSGPT 20  --   --   --   --    ASTSGOT 16  --   --   --   --    ALKPHOSPHAT 57  --   --   --   --    TBILIRUBIN 0.4  --   --   --   --    PREALBUMIN  --   --   --  15.0* 17.0*   GLUCOSE 225* 143* 202*  --  119*     Recent Labs     02/16/20 0454 02/17/20 0430 02/17/20  1730 02/18/20  0415   WBC 12.4* 30.1*  --  20.9*   NEUTSPOLYS 98.20*  --   --   --    LYMPHOCYTES 1.80*  --   --   --    MONOCYTES 0.00  --   --   --    EOSINOPHILS 0.00  --  1  1  --    BASOPHILS 0.00  --   --   --    ASTSGOT 16  --   --   --    ALTSGPT 20  --   --   --    ALKPHOSPHAT 57  --   --   --    TBILIRUBIN 0.4  --   --   --      Recent Labs     02/16/20  0454 02/17/20  0430 02/18/20  0415   RBC 3.60* 3.27* 3.06*   HEMOGLOBIN 11.1* 10.2* 9.5*   HEMATOCRIT 34.6* 31.5* 29.3*   PLATELETCT 206 198 192       Imaging  X-Ray:  I have personally reviewed the images and compared with prior images. and My impression is: similar bilateral infiltrates ET in place, OG below diaphram  CT:    Reviewed    Assessment/Plan  *  Acute respiratory failure with hypoxia (HCC)  Assessment & Plan  Intubated 2/17 for diagnostic workup -> extubated 2/18    Discussed with Dr Cate hernandez 2/17, Dr Longo 2/18  Progression of diffuse airspace opacities with chronic fibrotic changes in the background of emphysema concerning of progression of immunotherapy/radiation ILD +/- ILD execarbation +/- pneumonia vs check point inhibitor induced lung injury  DDx of pneumonia include PJP (pt was on prednisone 80mg and taper in the past month without PJP prophx), bacterial pneumonia (strep pneumo, staph, GNB), non-flu viral pneumonia (influenza A/B PCR negative), fungal.     Plan:   Extubated to HFNC and monitor need for bipap or intubation  Patient would not want trach or chronic ventilator dependency   Agree with linezolid and cefepime, TMP/SMX 15mg/kg IV Q6H. Monitor renal function   Solumedrol 1gr IV x3 days 2/17-2/19  Check full respiratory viral PCR from nasopharynx  Check fungitell, aspergillus, cryptococcus antigen, cocci Ab, s/p BAL for silver stain, PJP DFA, afb, fungal cx, cd4/8 ratio, cell diff, culture/BAL from RML and LLL  Urine strep pnuemo and urine legionella antigen  Dry fluid balance even as much as tolerated.  Up to chair and IS as much as possible.       Lung cancer, primary, with metastasis from lung to other site (HCC)- (present on admission)  Assessment & Plan  Hx of squamous cell lung CA (dx in 9/2019, in [facility] mets to lymph nodes   s/p chemoXRT (last 2 months ago)  recently received immunotherapy (durvalumab IV) 3 weeks prior to admission      Leukocytosis  Assessment & Plan  Serial monitor steroid vs infectious       Pneumonia  Assessment & Plan  Currently treating broadly  Follow on up BAL RML, LLL, PJP DFA, silver stain, AFB, legionella and strep antigen, viral panel, Cocci, fungal, flu negative  ID following  Bactrim, Linezolid, Cefepime  Follow closely on pulse dose steroids    Pulmonary fibrosis (HCC)  Assessment & Plan  Seen on  CT 1/14/2020 and 2/15/202    Pulmonary HTN (HCC)- (present on admission)  Assessment & Plan  Prior echo with RVSP 45 related to chronic lung disease  Dry fluid volume as tolerated    Macrocytic anemia- (present on admission)  Assessment & Plan  Serial monitor hg restrictive transfusion hg < 7    COPD (chronic obstructive pulmonary disease) (HCC)- (present on admission)  Assessment & Plan  Background COPD with 2-3L home O2       VTE:  Lovenox  Ulcer: Not Indicated  Lines: Whyte Catheter  Ongoing indication addressed    I have performed a physical exam and reviewed and updated ROS and Plan today (2/18/2020). In review of yesterday's note (2/17/2020), there are no changes except as documented above.     Discussed patient condition and risk of morbidity and/or mortality with Family, RN, RT, Pharmacy, Charge nurse / hot rounds, Patient, infectious disease and pulmonology     The patient remains critically ill with ventilator dependency with extubation trial to HFNC with close monitoring.  Critical care time = 86 minutes in directly providing and coordinating critical care and extensive data review.  No time overlap and excludes procedures.

## 2020-02-19 NOTE — PROGRESS NOTES
12 hour chart check completed.  Bedside report received from ANSHUL Ring.  Pt resting comfortably in bed, denies pain/discomfort at this time, attached to monitor, alarms set properly, call light within reach.

## 2020-02-19 NOTE — CARE PLAN
Problem: Ventilation Defect:  Goal: Ability to achieve and maintain unassisted ventilation or tolerate decreased levels of ventilator support  Outcome: PROGRESSING AS EXPECTED   Pt has been on BIPAP, 14/8, 80-90% overnight. Attempted off on HHFNC for 5 min, but needed BIPAP for oxygenation.     Problem: Ventilation Defect:  Goal: Ability to achieve and maintain unassisted ventilation or tolerate decreased levels of ventilator support  Outcome: PROGRESSING AS EXPECTED   Duoneb Q4H

## 2020-02-19 NOTE — PROGRESS NOTES
Infectious Disease Progress Note    Author: Lorri Worthy M.D. Date & Time of service: 2020  8:18 AM    Chief Complaint:  Follow-up for acute on chronic hypoxic respiratory failure, pulmonary infiltrates    Interval History:   afebrile WBC 20.9 patient intubated yesterday secondary to worsening respiratory distress.  Multiple cultures obtained and are currently pending.  Patient is awake on the vent and communicating by writing.  She is breathing comfortably with plans for possible extubation today.   afebrile WBC 20.4 patient extubated yesterday.  BAL cultures negative to date.  Beta D glucan positive.  Currently on BiPAP.  Reports minimal cough    Labs Reviewed, Medications Reviewed and Radiology Reviewed.    Review of Systems:  Review of Systems   Constitutional: Negative for chills and fever.   Respiratory: Positive for cough and shortness of breath.    Gastrointestinal: Negative for abdominal pain, diarrhea, nausea and vomiting.   Neurological: Negative for dizziness and headaches.        Hemodynamics:  Temp (24hrs), Av.6 °C (97.8 °F), Min:36.3 °C (97.3 °F), Max:37.1 °C (98.8 °F)  Temperature: 36.3 °C (97.3 °F), Monitored Temp: 35.1 °C (95.2 °F)  Pulse  Av.4  Min: 74  Max: 132   Blood Pressure: 140/58       Physical Exam:  Physical Exam  Constitutional:       Appearance: Normal appearance.   HENT:      Mouth/Throat:      Mouth: Mucous membranes are moist.   Eyes:      Extraocular Movements: Extraocular movements intact.      Conjunctiva/sclera: Conjunctivae normal.      Pupils: Pupils are equal, round, and reactive to light.   Neck:      Musculoskeletal: Normal range of motion and neck supple.   Cardiovascular:      Rate and Rhythm: Normal rate.      Comments: Right upper chest port in place-nontender, no surrounding erythema  Pulmonary:      Comments: Coarse breath sounds bilaterally  Abdominal:      Palpations: Abdomen is soft.      Tenderness: There is no abdominal tenderness.    Musculoskeletal: Normal range of motion.   Skin:     General: Skin is warm and dry.   Neurological:      General: No focal deficit present.      Mental Status: She is alert and oriented to person, place, and time.      Cranial Nerves: No cranial nerve deficit.   Psychiatric:         Mood and Affect: Mood normal.         Behavior: Behavior normal.      Comments: Very pleasant         Meds:    Current Facility-Administered Medications:   •  linezolid  •  MD Alert...Adult ICU Electrolyte Replacement per Pharmacy  •  lidocaine  •  labetalol  •  methylPREDNISolone  •  Pharmacy  •  ascorbic acid  •  senna-docusate **AND** polyethylene glycol/lytes **AND** magnesium hydroxide **AND** bisacodyl  •  acetaminophen  •  guaiFENesin dextromethorphan  •  hydrALAZINE  •  ondansetron  •  promethazine  •  vitamin D  •  Notify provider if pain remains uncontrolled **AND** Use the numeric rating scale (NRS-11) on regular floors and Critical-Care Pain Observation Tool (CPOT) on ICUs/Trauma to assess pain **AND** Pulse Ox (Oximetry) **AND** Pharmacy Consult Request **AND** If patient difficult to arouse and/or has respiratory depression, stop any opiates that are currently infusing and call a Rapid Response. **AND** oxyCODONE immediate-release **AND** oxyCODONE immediate-release **AND** morphine injection  •  hydrALAZINE  •  cefepime  •  sulfamethoxazole-trimethoprim (Septra) IV  •  enoxaparin  •  ondansetron  •  promethazine  •  prochlorperazine  •  Respiratory Therapy Consult  •  ipratropium-albuterol  •  ipratropium-albuterol    Labs:  Recent Labs     02/17/20  0430 02/17/20  1730 02/18/20  0415 02/19/20  0340   WBC 30.1*  --  20.9* 20.4*   RBC 3.27*  --  3.06* 3.06*   HEMOGLOBIN 10.2*  --  9.5* 9.5*   HEMATOCRIT 31.5*  --  29.3* 29.2*   MCV 96.3  --  95.8 95.4   MCH 31.2  --  31.0 31.0   RDW 55.0*  --  54.6* 56.3*   PLATELETCT 198  --  192 189   MPV 9.3  --  9.7 9.8   EOSINOPHILS  --  1  1  --   --      Recent Labs      02/17/20  1759 02/18/20  0415 02/19/20  0340   SODIUM 139 137 140   POTASSIUM 3.6 4.0 4.4   CHLORIDE 98 100 100   CO2 30 31 29   GLUCOSE 202* 119* 134*   BUN 17 16 21     Recent Labs     02/17/20  0430 02/17/20  1759 02/18/20  0415 02/19/20  0340   ALBUMIN 3.2  --  3.3 3.1*   CREATININE 0.55 0.70 0.67 0.61       Imaging:  Dx-chest-portable (1 View)    Result Date: 2/18/2020 2/18/2020 3:10 AM HISTORY/REASON FOR EXAM: For indication of respiratory failure; For indication of respiratory failure TECHNIQUE/EXAM DESCRIPTION:  Single AP view of the chest. COMPARISON: Yesterday FINDINGS: Dobbhoff tube has been placed in the interim, terminating below the lower margin of the film within the abdomen.  Otherwise medical device position is stable. The cardiac silhouette appears within normal limits. The mediastinal contour appears within normal limits.  The central  pulmonary vasculature appears prominent and indistinct. The lungs appear well expanded bilaterally.  Hazy interstitial bilateral pulmonary opacities are seen. No significant pleural effusions are identified. The bony structures appear age-appropriate.     1.  Pulmonary edema and/or infiltrates are identified, which are stable since the prior exam.    Dx-chest-portable (1 View)    Result Date: 2/17/2020 2/17/2020 6:02 PM HISTORY/REASON FOR EXAM:  POST INTUBATION. TECHNIQUE/EXAM DESCRIPTION AND NUMBER OF VIEWS: Single portable view of the chest. COMPARISON: 2/15/2020 FINDINGS: Endotracheal tube projects at the level of the clavicular heads. Right chest port projects over the SVC. The cardiomediastinal silhouette is stable. Interstitial and alveolar airspace opacities are again noted and increased on the right compared to prior. No definite pleural effusion or pneumothorax is identified.     Extensive interstitial and alveolar airspace opacities are increased on the right compared to prior. Findings may represent edema or multifocal pneumonia. Underlying mass is  not excluded. Underlying emphysematous changes.    Dx-chest-portable (1 View)    Result Date: 2/15/2020  2/15/2020 11:50 AM HISTORY/REASON FOR EXAM: possible sepsis.. Shortness of breath. Lung carcinoma. TECHNIQUE/EXAM DESCRIPTION AND NUMBER OF VIEWS: Single AP view of the chest. COMPARISON: 11/15/2019 FINDINGS: Hardware: Right IJ portacatheter tip overlies the brachiocephalic confluence Lungs: Significant interval worsening consolidated opacity in the left lung with some volume loss noted. There is also worsening diffuse, bilateral reticular opacification Pleura:  Possible layering left pleural fluid Heart and mediastinum: Left heart border is no longer visualized, completely effaced. No gross cardiac silhouette enlargement     Significant interval worsening left perihilar/lingular consolidation with possible underlying Mass. Significant worsening diffuse reticular abnormal opacity is consistent with underlying interstitial lung disease. Superimposed atypical infection or edema is possible New left lung volume loss    Ir-cvc Port Placement > Age 5    Result Date: 1/30/2020 1/30/2020 9:34 AM HISTORY/REASON FOR EXAM:  Patient requires central venous access for chemotherapy. TECHNIQUE/EXAM DESCRIPTION: Following informed consent patient was prepped and draped in the usual fashion.  A total of 14 cc of 1% lidocaine with epinephrine was utilized for local and subcutaneous anesthesia. SEDATION: 4 mg of Versed and 100 mcg of fentanyl were utilized for IV conscious sedation. The procedure was monitored continuously by the sedation nurse. 30 minutes of sedation time were utilized for the procedure. Fluoroscopy images: 3 fluoroscopic images obtained. Fluoroscopy time: 0.6 minutes The procedure was performed with MAXIMUM STERILE BARRIER TECHNIQUE including sterile gown, mask, cap, and done in a sterile gloves following appropriate hand hygiene and/or sterile scrub. The patient's skin site was prepped with 2% chlorhexidine  solution. Ultrasound evaluation of the right internal jugular vein demonstrated patency and an image was archived in the PACS system. Utilizing ultrasonic and fluoroscopic guidance right internal jugular vein was accessed and an 8-Kyrgyz sheath dilator was positioned in the right internal jugular vein.  Next utilizing a combination of blunt and sharp dissection the port pocket was created in the right infraclavicular fossa.  The port was subsequently attached to the catheter and utilizing a tunneling device a subcutaneous tract was created to the neck puncture site.  The catheter was pulled through the tunnel and trimmed to length.  Next the catheter was advanced through the peel-away sheath which was subsequently removed. The port pocket was closed with 4 deep 2-0 Vicryl sutures, the skin was closed Dermabond.  The  neck puncture site was closed with Dermabond and fluoroscopic images were obtained.  The patient tolerated procedure well. COMPARISON:  None FINDINGS: Fluoroscopic images revealed good positioning of the port in the right infraclavicular fossa.  There is a smooth arc of the catheter into the right internal jugular vein.  The distal tip of the catheter is at the caval atrial junction.     Successful percutaneous placement of Port-A-Cath via right internal jugular approach utilizing ultrasonic and fluoroscopic guidance.    Ct-cta Chest Pulmonary Artery W/ Recons    Result Date: 2/15/2020  2/15/2020 10:22 PM HISTORY/REASON FOR EXAM:  Shortness of breath TECHNIQUE/EXAM DESCRIPTION: CT angiogram scan for pulmonary embolism with contrast, with reconstructions. 1.25 mm helical sections were obtained from the lung apices through the lung bases following the rapid bolus administration of 55 mL of Omnipaque 350 nonionic contrast. Thin-section overlapping reconstruction interval was utilized. Coronal reconstructions were generated from the axial data. MIP post processing was performed and utilized for the  interpretation. Low dose optimization technique was utilized for this CT exam including automated exposure control and adjustment of the mA and/or kV according to patient size. COMPARISON: 1/14/2020 FINDINGS: Pulmonary Embolism: No. Main Pulmonary Arteries: No. Segmental branches: No. Subsegmental branches: No. Additional Comments: None. Lungs: There are increased extensive multifocal airspace groundglass and interstitial opacities. There is underlying emphysema and interstitial lung disease. There is a redemonstrated stellate area of masslike scarring in the left lower lobe measuring 2.6 x 3.1 cm, essentially unchanged from previous exam. Pleura: No pleural effusion. Nodes: No enlarged lymph nodes. Additional findings: There is a small to moderate sized hiatal hernia. There is diffuse low-attenuation of the hepatic parenchyma consistent with steatosis.     1.  No evidence of pulmonary embolism. 2.  Increased extensive multifocal airspace groundglass and interstitial opacities. Findings could be consistent with edema and/or infection. 3.  Underlying emphysema and interstitial lung disease. 4.  Redemonstrated stellate area of masslike scarring in the left lower lobe measuring 2.6 x 3.1 cm, essentially unchanged from the previous exam and consistent with known malignancy. 5.  Small to moderate sized hiatal hernia. 6.  Hepatic steatosis.     Dx-abdomen For Tube Placement    Result Date: 2/17/2020 2/17/2020 8:42 PM HISTORY/REASON FOR EXAM: cortrak placement TECHNIQUE/EXAM DESCRIPTION:  Single AP view the abdomen. COMPARISON:  None FINDINGS: Hazy bilateral lower lobe opacities are seen. Dobbhoff tube is seen, the tip lies to the right of the lumbar spine.  The bowel gas pattern appears nonspecific. The bony structures appear age-appropriate.     1.  Nonspecific bowel gas pattern. 2.  Dobbhoff tube tip terminates overlying the expected location of the gastric antrum. 3.  Hazy bilateral lung base infiltrates or  edema.      Micro:  Results     Procedure Component Value Units Date/Time    AFB Culture - BAL [[id number]] Collected:  02/17/20 1750    Order Status:  Completed Specimen:  Respirate from Sputum Updated:  02/19/20 0739     Significant Indicator NEG     Source RESP     Site Quantitative BAL LLL     Culture Result Culture in progress.     AFB Smear Results No acid fast bacilli seen.    Narrative:       Collected By:[id number] BLUE.  Which Lobe (Bronch Only):->LLL  Collected By:[id number] CHUCK BLUE.    Fungal Culture - BAL [[id number]] Collected:  02/17/20 1750    Order Status:  Completed Specimen:  Respirate from Sputum Updated:  02/19/20 0739     Significant Indicator NEG     Source RESP     Site Quantitative BAL LLL     Culture Result No fungal growth to date.    Narrative:       Collected By:[id number] CHUCK BLUE.  Which Lobe (Bronch Only):->LLL  Collected By:[id number] CHUCK BLUE.    Fungal Smear - BAL [[id number]] Collected:  02/17/20 1750    Order Status:  Completed Specimen:  Respirate from Sputum Updated:  02/19/20 0739     Significant Indicator NEG     Source RESP     Site Quantitative BAL LLL     Fungal Smear Results No fungal elements seen.    Narrative:       Collected By:[id number] CHUCK BLUE.  Which Lobe (Bronch Only):->LLL  Collected By:[id number] CHUCK BLUE.    QUANT BRONCHIAL WASHING [[id number]] Collected:  02/17/20 1750    Order Status:  Completed Specimen:  Respirate Updated:  02/19/20 0739     Significant Indicator NEG     Source RESP     Site Quantitative BAL LLL     Culture Result No growth at 24 hours.     Gram Stain Result Rare WBCs.  No organisms seen.      Narrative:       Collected By:[id number] CHUCK BLUE.  Which Lobe (Bronch Only):->LLL  Collected By:[id number] CHUCK BLUE.    Acid Fast Stain [[id number]] Collected:  02/17/20 1750    Order Status:  Completed Specimen:  Respirate Updated:  02/18/20 1622     Significant Indicator NEG     Source RESP     Site Quantitative BAL LLL      AFB Smear Results No acid fast bacilli seen.    Narrative:       Collected By:[id number] CHUCK BLUE.  Which Lobe (Bronch Only):->LLL  Collected By:[id number] CHUCK FERNANDEZ    GRAM STAIN [[id number]] Collected:  02/17/20 1750    Order Status:  Completed Specimen:  Respirate Updated:  02/18/20 1622     Significant Indicator .     Source RESP     Site Quantitative BAL LLL     Gram Stain Result Rare WBCs.  No organisms seen.      Narrative:       Collected By:[id number] CHUCK BLUE.  Which Lobe (Bronch Only):->LLL  Collected By:[id number] HCUCK BLUE.    CULTURE RESPIRATORY W/ GRM STN [[id number]] Collected:  02/17/20 1735    Order Status:  Completed Specimen:  Respirate from Sputum Updated:  02/18/20 1555     Significant Indicator NEG     Source RESP     Site Bronchoalveolar Lavage RML     Culture Result No growth at 24 hours.     Gram Stain Result Rare WBCs.  No organisms seen.      Narrative:       Collected By:[id number] CHUCK FERNANDEZ  Which Lobe (Bronch Only):->RML  Collected By:[id number] CHUCK FERNANDEZ    GRAM STAIN [[id number]] Collected:  02/17/20 1735    Order Status:  Completed Specimen:  Respirate Updated:  02/18/20 1320     Significant Indicator .     Source RESP     Site Bronchoalveolar Lavage RML     Gram Stain Result Rare WBCs.  No organisms seen.      Narrative:       Collected By:[id number] CHUCK BLUE.  Which Lobe (Bronch Only):->RML  Collected By:[id number] CHUCK FERNANDEZ    URINE CULTURE(NEW) [[id number]] Collected:  02/16/20 0425    Order Status:  Completed Specimen:  Urine Updated:  02/18/20 0728     Significant Indicator NEG     Source UR     Site -     Culture Result Mixed skin anitha <10,000 cfu/mL    Narrative:       Indication for culture:->Septic Shock: Persistent  hypotension,  Lactic acid > 4, vasopressors/inotropes started  Indication for culture:->Septic Shock: Persistent    Pneumocystis DFA [[id number]] Collected:  02/17/20 1821    Order Status:  Canceled Specimen:  Other from Bronchial Washings   "   CULTURE RESPIRATORY W/ GRM STN [[id number]] Collected:  02/17/20 1750    Order Status:  Canceled Specimen:  Other from Sputum     Quant Bronchial Washing [[id number]] Collected:  02/17/20 1750    Order Status:  Canceled Specimen:  Sputum     Culture Respiratory W/ Grm Stn - BAL [[id number]] Collected:  02/17/20 1750    Order Status:  Canceled Specimen:  Sputum     CULTURE RESPIRATORY W/ GRM STN [[id number]] Collected:  02/17/20 1750    Order Status:  Canceled Specimen:  Sputum     MRSA By PCR (Amp) [[id number]] Collected:  02/16/20 1315    Order Status:  Completed Specimen:  Respirate from Nares Updated:  02/16/20 1901     Significant Indicator NEG     Source RESP     Site NARES     MRSA PCR Negative for MRSA by PCR.    Narrative:       Collected By:[id number] BERTRAM JACOME  Per [facility] Protocol  Collected By:[id number] BERTRAM JACOME    Cryptococcal Antigen [[id number]]     Order Status:  Sent Specimen:  Blood     BLOOD CULTURE [[id number]] Collected:  02/15/20 1155    Order Status:  Completed Specimen:  Blood from Peripheral Updated:  02/16/20 0939     Significant Indicator NEG     Source BLD     Site PERIPHERAL     Culture Result No Growth  Note: Blood cultures are incubated for 5 days and  are monitored continuously.Positive blood cultures  are called to the RN and reported as soon as  they are identified.      Narrative:       Per Hospital Policy: Only change Specimen Src: to \"Line\" if  specified by physician order.  Right Wrist    BLOOD CULTURE [[id number]] Collected:  02/15/20 1245    Order Status:  Completed Specimen:  Blood from Peripheral Updated:  02/16/20 0939     Significant Indicator NEG     Source BLD     Site PERIPHERAL     Culture Result No Growth  Note: Blood cultures are incubated for 5 days and  are monitored continuously.Positive blood cultures  are called to the RN and reported as soon as  they are identified.      Narrative:       Per Hospital Policy: Only change Specimen Src: to \"Line\" " if  specified by physician order.  Right AC    URINALYSIS [[id number]] Collected:  02/16/20 0425    Order Status:  Completed Specimen:  Urine Updated:  02/16/20 0517     Color Yellow     Character Clear     Specific Gravity 1.020     Ph 6.0     Glucose Negative mg/dL      Ketones Negative mg/dL      Protein Negative mg/dL      Bilirubin Negative     Urobilinogen, Urine 0.2     Nitrite Negative     Leukocyte Esterase Negative     Occult Blood Negative     Micro Urine Req see below     Comment: Microscopic examination not performed when specimen is clear  and chemically negative for protein, blood, leukocyte esterase  and nitrite.         Narrative:       Indication for culture:->Septic Shock: Persistent  hypotension,  Lactic acid > 4, vasopressors/inotropes started    Influenza A/B By PCR (Adult - Flu Only) [[id number]] Collected:  02/15/20 1253    Order Status:  Completed Specimen:  Respirate from Nasopharyngeal Updated:  02/15/20 1337     Influenza virus A RNA Negative     Influenza virus B, PCR Negative    Narrative:       Indication for culture:->Septic Shock: Persistent  hypotension,  Lactic acid > 4, vasopressors/inotropes started    Culture Respiratory W/ GRM STN [[id number]] Collected:  02/15/20 0000    Order Status:  Canceled Specimen:  Sputum           Assessment:  Active Hospital Problems    Diagnosis   • *Acute respiratory failure with hypoxia (HCC) [J96.01]   • Lung cancer, primary, with metastasis from lung to other site (HCC) [C34.90]   • COPD (chronic obstructive pulmonary disease) (HCC) [J44.9]   • Pulmonary fibrosis (HCC) [J84.10]   • Pneumonia [J18.9]       Plan:  Vent dependent respiratory failure, improved  Intubated on 2/17  Extubated on 2/18  Now on BiPAP  Status post bronchoscopy with BAL on 2/17.  Patient was noted to have friable mucosa bilateral, bloody mucoid secretions.    Pneumonia/pulmonary opacities noted on imaging  Patient was on high-dose steroids prior to admission so she is certainly  at risk for opportunistic infections  PCP PCR- negative  Status post BAL on 2/17.  BAL cultures-negative to date  Serum galactomannan- pending  Cocci serology- pending  Fungitell - positive  DC bactrim as PCP PCR negative  Continue oral linezolid, IV cefepime   Monitor CBC while patient is on linezolid     Leukocytosis, persistent  Multifactorial (infection, high-dose steroids- currently on IV Solu-Medrol)  On antibiotics above  Monitor    Metastatic squamous cell lung cancer   Status post chemoradiation  Previously on immunotherapy    Pulmonary fibrosis  Patient was seen by pulmonologist, Dr. Longo prior to admission  Patient was on high-dose steroid taper prior to admission, started by oncology  On Solu-Medrol    COPD/chronic hypoxia  On 3LNC baseline    I have performed a physical exam and reviewed and updated ROS and plan today 2/19/2020.  In review of yesterday's note 2/18/2020, there are no changes except as documented above.      Discussed with intensivist, Dr. Tovar and RN

## 2020-02-19 NOTE — CARE PLAN
Problem: Safety  Goal: Will remain free from falls  Outcome: PROGRESSING AS EXPECTED  Intervention: Assess risk factors for falls  Pt Calls for Assistance: Yes  History of fall: 0  Intervention: Implement fall precautions  Note: See Flowsheets     Problem: Infection  Goal: Will improve  Outcome: PROGRESSING AS EXPECTED     Problem: Knowledge Deficit  Goal: Knowledge of the prescribed therapeutic regimen will improve  Outcome: PROGRESSING AS EXPECTED  Intervention: Discuss information regarding therpeutic regimen and document in education  Note: See education.

## 2020-02-19 NOTE — CARE PLAN
Adult Noninvasive Ventilation    BiPap Day 1  IPAP (cmH2O): 12 (02/17/20 1005)  EPAP (cm H2O): 8 (02/17/20 1005)  FiO2: 100 (02/17/20 1005)         Events/Summary/Plan: HHFNC titrated to 50L 60% (02/18/20 1202)

## 2020-02-19 NOTE — PROGRESS NOTES
Critical Care Progress Note    Date of admission  2/15/2020    Chief Complaint  63 yo female, former smoker 35pack year quit in 2012 with hx of squamous cell lung CA (dx in 9/2019, in [facility] mets to lymph nodes s/p chemoXRT (last 2 months ago), recently received immunotherapy (durvalumab IV) 3 weeks prior to admission), COPD (on 3L home O2), presented on 2/15 for worsening SOB x2-3 days. Pt was seen by pulmonary Dr. 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 Immunotherapy was held. She was started on high dose prednisone 80mg then taper in the past 3 weeks by Dr Callahan.      At admission, influenza A/B PCR is negative. pt was on oxymask 5L, sat >90% and progressively increasing to 8L oxymask. Afebrile, SBP in 100-110s. WBC 17K -12K. Creatinine 0.5, HCO3 26. Lactate 1.5. Procalcitonin 0.12. CT chest today showed increased extensive multifocal airspace ground glass and interstitial opacities. No PE. Started on solumedrol 125 Q6H. Pt initially started on unasyn, then changed to linezolid and cefepime. Pt was transferred to ICU for further evaluation Taken from Dr White note.     Hospital Course    Interval Problem Update  Reviewed last 24 hour events:  Neuro: aox4, mild pain oxy last night   HR: snr to tach   SBP: 100-140's  Tmax: afebrile  GI: regular diet pain with swallowing, 2/17 BM  UOP: 1l   Lines: right sub port, flores  Resp: bipap 14/10 85% HFNC 60/100%   Vte: lovenox  PPI/H2:n/a  Antibx: linezolid, cefepimine, bactran  Beta d glucan    Inc Epap to 10   Lasix 20mg IV  On HFNC 60L/100%  Desaturates quickly  Able eat lunch  PCP pcr negative -> stop bactrim  Lasix 20mg IV    Review of Systems  Review of Systems   Constitutional: Negative for fever and malaise/fatigue.   HENT: Negative for ear discharge and nosebleeds.    Eyes: Negative for double vision and discharge.   Respiratory: Positive for shortness of breath. Negative for cough, sputum production and  stridor.    Gastrointestinal: Positive for nausea. Negative for abdominal pain, constipation and vomiting.   Genitourinary: Negative for dysuria and frequency.   Neurological: Negative for tremors, sensory change, speech change, weakness and headaches.   Psychiatric/Behavioral: Negative for depression and hallucinations. The patient is not nervous/anxious and does not have insomnia.         Vital Signs for last 24 hours   Temp:  [36.3 °C (97.3 °F)-37.2 °C (99 °F)] 37.2 °C (99 °F)  Pulse:  [] 85  Resp:  [15-27] 21  BP: (107-147)/(43-71) 125/56  SpO2:  [73 %-99 %] 95 %    Hemodynamic parameters for last 24 hours       Respiratory Information for the last 24 hours       Physical Exam   Physical Exam  Constitutional:       Appearance: She is not ill-appearing, toxic-appearing or diaphoretic.   HENT:      Mouth/Throat:      Mouth: Mucous membranes are moist.      Comments: Bipap  Eyes:      Pupils: Pupils are equal, round, and reactive to light.   Neck:      Musculoskeletal: No neck rigidity or muscular tenderness.   Cardiovascular:      Rate and Rhythm: Normal rate.      Heart sounds: No murmur.   Pulmonary:      Effort: No respiratory distress.      Breath sounds: No stridor. No wheezing, rhonchi or rales.      Comments: Bilateral crackles right > left   Chest:      Chest wall: No tenderness.   Abdominal:      General: There is no distension.      Palpations: There is no mass.      Tenderness: There is no abdominal tenderness. There is no guarding or rebound.      Hernia: No hernia is present.   Musculoskeletal:         General: No swelling or tenderness.      Comments: Clubbing bilateral   Neurological:      Mental Status: She is alert and oriented to person, place, and time.      Cranial Nerves: No cranial nerve deficit.      Sensory: No sensory deficit.      Motor: No weakness.      Coordination: Coordination normal.      Gait: Gait normal.      Comments: Answers quickly moves all extremities   Psychiatric:          Mood and Affect: Mood normal.         Medications  Current Facility-Administered Medications   Medication Dose Route Frequency Provider Last Rate Last Dose   • [START ON 2/20/2020] vitamin D (cholecalciferol) tablet 2,000 Units  2,000 Units Oral DAILY Kenton Tovar M.D.       • promethazine (PHENERGAN) tablet 12.5-25 mg  12.5-25 mg Oral Q4HRS PRN Kenton Tovar M.D.       • oxyCODONE immediate release (ROXICODONE) tablet 10 mg  10 mg Oral Q3HRS PRN Kenton Tovar M.D.        And   • Pharmacy Consult Request ...Pain Management Review 1 Each  1 Each Other PHARMACY TO DOSE Kenton Tovar M.D.        And   • oxyCODONE immediate-release (ROXICODONE) tablet 5 mg  5 mg Oral Q3HRS PRN Kenton Tovar M.D.        And   • morphine (pf) 4 MG/ML injection 4 mg  4 mg Intravenous Q3HRS PRN Kenton Tovar M.D.       • ondansetron (ZOFRAN ODT) dispertab 4 mg  4 mg Oral Q4HRS PRN Kenton Tovar M.D.       • magnesium hydroxide (MILK OF MAGNESIA) suspension 30 mL  30 mL Oral QDAY PRN Kenton Tovar M.D.        And   • senna-docusate (PERICOLACE or SENOKOT S) 8.6-50 MG per tablet 2 Tab  2 Tab Oral BID Kenton Tovar M.D.        And   • polyethylene glycol/lytes (MIRALAX) PACKET 1 Packet  1 Packet Oral QDAY PRN Kenton Tovar M.D.        And   • bisacodyl (DULCOLAX) suppository 10 mg  10 mg Rectal QDAY PRN Kenton Tovar M.D.       • linezolid (ZYVOX) tablet 600 mg  600 mg Oral Q12HRS Kenton Tovar M.D.       • hydrALAZINE (APRESOLINE) tablet 10 mg  10 mg Oral Q8HRS PRN Kenton Tovar M.D.       • guaiFENesin dextromethorphan (ROBITUSSIN DM) 100-10 MG/5ML syrup 10 mL  10 mL Oral Q6HRS PRN Kenton Tovar M.D.       • acetaminophen (TYLENOL) tablet 650 mg  650 mg Oral Q6HRS PRN Kenton Tovar M.D.       • [START ON 2/20/2020] ascorbic acid tablet 1,000 mg  1,000 mg Oral DAILY Kenton Tovar M.D.       • furosemide (LASIX) injection 20 mg  20 mg Intravenous Once Kenton Tovar M.D.        • MD Alert...ICU Electrolyte Replacement per Pharmacy   Other PHARMACY TO DOSE Kenton Tovar M.D.       • lidocaine (XYLOCAINE) 1 % injection 1-2 mL  1-2 mL Tracheal Tube Q30 MIN PRN Kenton Tovar M.D.       • labetalol (NORMODYNE/TRANDATE) injection 10 mg  10 mg Intravenous Q4HRS PRN Kenton Tovar M.D.   10 mg at 02/17/20 1805   • methylPREDNISolone sod succ (SOLU-MEDROL) 1,000 mg in  mL IVPB  1 g Intravenous DAILY Kenton Tovar M.D.   Stopped at 02/19/20 0649   • Pharmacy Consult: Enteral tube insertion - review meds/change route/product selection  1 Each Other PHARMACY TO DOSE Kenton Tovar M.D.       • hydrALAZINE (APRESOLINE) injection 20 mg  20 mg Intravenous Q4HRS PRN Lazaro Benites M.D.   20 mg at 02/17/20 2023   • cefepime (MAXIPIME) 2 g in  mL IVPB  2 g Intravenous Q8HRS Ruben Tinajero M.D. 200 mL/hr at 02/19/20 1419 2 g at 02/19/20 1419   • enoxaparin (LOVENOX) inj 40 mg  40 mg Subcutaneous DAILY Dino Sykes M.D.   40 mg at 02/19/20 0509   • ondansetron (ZOFRAN) syringe/vial injection 4 mg  4 mg Intravenous Q4HRS PRN Dino Sykes M.D.       • promethazine (PHENERGAN) suppository 12.5-25 mg  12.5-25 mg Rectal Q4HRS PRN Dino Sykes M.D.       • prochlorperazine (COMPAZINE) injection 5-10 mg  5-10 mg Intravenous Q4HRS PRN Dino Sykes M.D.       • Respiratory Therapy Consult   Nebulization Continuous RT Dino Sykes M.D.       • ipratropium-albuterol (DUONEB) nebulizer solution  3 mL Nebulization Q4HRS (RT) Dino Sykes M.D.   3 mL at 02/19/20 1520   • ipratropium-albuterol (DUONEB) nebulizer solution  3 mL Nebulization Q2HRS PRN (RT) Dino Sykes M.D.           Fluids    Intake/Output Summary (Last 24 hours) at 2/19/2020 1606  Last data filed at 2/19/2020 1400  Gross per 24 hour   Intake 2484.91 ml   Output 3125 ml   Net -640.09 ml       Laboratory  Recent Labs     02/17/20  1829 02/18/20  0024 02/18/20  0432   ISTATAPH  7.264* 7.390* 7.531*   ISTATAPCO2 80.0* 50.2* 36.8   ISTATAPO2 111* 71 71   ISTATATCO2 39* 32 32   [id number] 97 93 96   ISTATARTHCO3 36.3* 30.4* 30.8*   ISTATARTBE 7* 5* 8*   ISTATTEMP 37.1 C 36.7 C 36.6 C   ISTATFIO2 100 75 75   ISTATSPEC Arterial Arterial Arterial   ISTATAPHTC 7.263* 7.394* 7.537*   [id number] 112* 69 69         Recent Labs     02/17/20  0430 02/17/20  1759 02/18/20  0415 02/19/20  0340   SODIUM 141 139 137 140   POTASSIUM 3.7 3.6 4.0 4.4   CHLORIDE 105 98 100 100   CO2 27 30 31 29   BUN 12 17 16 21   CREATININE 0.55 0.70 0.67 0.61   MAGNESIUM  --  1.7  --   --    PHOSPHORUS 2.9  --  2.8 3.6   CALCIUM 8.9 8.9 8.7 8.5     Recent Labs     02/17/20  1759 02/17/20  1945 02/18/20  0415 02/19/20  0340   PREALBUMIN  --  15.0* 17.0*  --    GLUCOSE 202*  --  119* 134*     Recent Labs     02/17/20  0430 02/17/20  1730 02/18/20  0415 02/19/20  0340   WBC 30.1*  --  20.9* 20.4*   EOSINOPHILS  --  1  1  --   --      Recent Labs     02/17/20  0430 02/18/20  0415 02/19/20  0340   RBC 3.27* 3.06* 3.06*   HEMOGLOBIN 10.2* 9.5* 9.5*   HEMATOCRIT 31.5* 29.3* 29.2*   PLATELETCT 198 192 189       Imaging  X-Ray:  I have personally reviewed the images and compared with prior images. and My impression is: similar bilateral infiltrates ET in place, OG below diaphram  CT:    Reviewed    Assessment/Plan  * Acute respiratory failure with hypoxia (HCC)  Assessment & Plan  Intubated 2/17 for diagnostic workup -> extubated 2/18    Discussed with Dr Catecelina hernandez 2/17, Dr Longo 2/18  Progression of diffuse airspace opacities with chronic fibrotic changes in the background of emphysema concerning of progression of immunotherapy/radiation ILD +/- ILD execarbation +/- pneumonia vs check point inhibitor induced lung injury  DDx of pneumonia include PJP (pt was on prednisone 80mg and taper in the past month without PJP prophx), bacterial pneumonia (strep pneumo, staph, GNB), non-flu viral pneumonia (influenza A/B PCR  negative), fungal.     Plan:   HFNC and bipap monitor need for intubation inc Epap  Patient would not want trach or chronic ventilator dependency   Agree with linezolid and cefepime, TMP/SMX -> d/c  Solumedrol 1gr IV x3 days 2/17-2/19  Check full respiratory viral PCR from nasopharynx  Check fungitell, aspergillus, cryptococcus antigen, cocci Ab, s/p BAL for silver stain, PJP DFA, afb, fungal cx, cd4/8 ratio, cell diff, culture/BAL from RML and [facility]  Urine strep pnuemo and urine legionella antigen  Dry fluid balance even as much as tolerated.  Up to chair and IS as much as possible.       Lung cancer, primary, with metastasis from lung to other site (HCC)- (present on admission)  Assessment & Plan  Hx of squamous cell lung CA (dx in 9/2019, in [facility] met[facility] to lymph nodes   s/p chemoXRT (last 2 months ago)  recently received immunotherapy (durvalumab IV) 3 weeks prior to admission      Leukocytosis  Assessment & Plan  Serial monitor steroid vs infectious       Pneumonia  Assessment & Plan  Currently treating broadly  Follow on up BAL RML, [facility], PJP DFA, silver stain, AFB, legionella and strep antigen, viral panel, Cocci, fungal, flu negative  ID following  Bactrim d/c 2/19, Linezolid, Cefepime  Follow closely on pulse dose steroids    Pulmonary fibrosis (HCC)  Assessment & Plan  Seen on CT 1/14/2020 and 2/15/202    Pulmonary HTN (HCC)- (present on admission)  Assessment & Plan  Prior echo with RVSP 45 related to chronic lung disease  Dry fluid volume as tolerated    Macrocytic anemia- (present on admission)  Assessment & Plan  Serial monitor hg restrictive transfusion hg < 7    COPD (chronic obstructive pulmonary disease) (HCC)- (present on admission)  Assessment & Plan  Background COPD with 2-3L home O2       VTE:  Lovenox  Ulcer: Not Indicated  Lines: Whyte Catheter  Ongoing indication addressed    I have performed a physical exam and reviewed and updated ROS and Plan today (2/19/2020). In review of yesterday's note  (2/18/2020), there are no changes except as documented above.     Discussed patient condition and risk of morbidity and/or mortality with Family, RN, RT, Pharmacy, Charge nurse / hot rounds, Patient, infectious disease and pulmonology     The patient remains critically ill needing bipap and HFNC on max setting and quick desaturation and need for possible intubation.  Critical care time = 110 minutes in directly providing and coordinating critical care and extensive data review.  No time overlap and excludes procedures.

## 2020-02-19 NOTE — PROGRESS NOTES
12 hour chart check     Monitor Summary:     Sinus rhythm-sinus tachycardia rate of 78-105bpm    0.12/0.08/0.4

## 2020-02-19 NOTE — CARE PLAN
Problem: Communication  Goal: The ability to communicate needs accurately and effectively will improve  Outcome: PROGRESSING AS EXPECTED  Intervention: Use communication aids and/or /Language Line as appropriate  Flowsheets (Taken 2/19/2020 0043)  Patient's Primary Language: English  Note: Pt able to verbalize needs and able to write extensive notes.      Problem: Safety  Goal: Will remain free from injury  Outcome: PROGRESSING AS EXPECTED  Note: Pt remains free from s/s of injury.

## 2020-02-20 NOTE — PROGRESS NOTES
Critical Care Progress Note    Date of admission  2/15/2020    Chief Complaint  65 yo female, former smoker 35pack year quit in 2012 with hx of squamous cell lung CA (dx in 9/2019, in [facility] mets to lymph nodes s/p chemoXRT (last 2 months ago), recently received immunotherapy (durvalumab IV) 3 weeks prior to admission), COPD (on 3L home O2), presented on 2/15 for worsening SOB x2-3 days. Pt was seen by pulmonary Dr. 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 Immunotherapy was held. She was started on high dose prednisone 80mg then taper in the past 3 weeks by Dr Callahan.      At admission, influenza A/B PCR is negative. pt was on oxymask 5L, sat >90% and progressively increasing to 8L oxymask. Afebrile, SBP in 100-110s. WBC 17K -12K. Creatinine 0.5, HCO3 26. Lactate 1.5. Procalcitonin 0.12. CT chest today showed increased extensive multifocal airspace ground glass and interstitial opacities. No PE. Started on solumedrol 125 Q6H. Pt initially started on unasyn, then changed to linezolid and cefepime. Pt was transferred to ICU for further evaluation Taken from Dr Wihte note.     Hospital Course  Intubated [facility] 2/17 pulse dose steroids started   Extubated 2/18 to bipap and HFNC  2/19 still needing significant bipap PCP neg bactrim stopped  2/20 changed code status, still needing bipap and HFNC mild/minimal improvement in CXR    Interval Problem Update  Reviewed last 24 hour events:  Neuro: aox4 no pain   HR: snr   SBP: 's  Tmax: 37.2  GI: last BM 2/17 regular diet  UOP: 835ml  Lines: port mobilize  Resp: bipap and hfnc 14/10 100%   Vte: lovenox  PPI/H2:n/a  Antibx: zyvox cefepimine   Last dose of high dose steroids    Mag phos  CXR some mild improvement  Discussed plan with patient   Dr Mayer ID   Changed code status to I okay, DNAR but pressors okay  pepcid BID, tums for likely steroid induce gastritis    Review of Systems  Review of Systems    Constitutional: Negative for fever and malaise/fatigue.   HENT: Negative for ear discharge and nosebleeds.    Eyes: Negative for double vision and discharge.   Respiratory: Positive for shortness of breath. Negative for cough, sputum production and stridor.    Gastrointestinal: Positive for nausea. Negative for abdominal pain, constipation and vomiting.   Genitourinary: Negative for dysuria and frequency.   Neurological: Negative for tremors, sensory change, speech change, weakness and headaches.   Psychiatric/Behavioral: Negative for depression and hallucinations. The patient is not nervous/anxious and does not have insomnia.         Vital Signs for last 24 hours   Temp:  [37.1 °C (98.8 °F)-37.3 °C (99.1 °F)] 37.3 °C (99.1 °F)  Pulse:  [] 84  Resp:  [17-28] 22  BP: (100-150)/(48-71) 135/71  SpO2:  [85 %-97 %] 94 %    Hemodynamic parameters for last 24 hours       Respiratory Information for the last 24 hours       Physical Exam   Physical Exam  Constitutional:       Appearance: She is not ill-appearing, toxic-appearing or diaphoretic.   HENT:      Mouth/Throat:      Mouth: Mucous membranes are moist.      Comments: Bipap  Eyes:      Pupils: Pupils are equal, round, and reactive to light.   Neck:      Musculoskeletal: No neck rigidity or muscular tenderness.   Cardiovascular:      Rate and Rhythm: Normal rate.      Heart sounds: No murmur.   Pulmonary:      Effort: No respiratory distress.      Breath sounds: No stridor. No wheezing, rhonchi or rales.      Comments: Bilateral crackles right > left   Chest:      Chest wall: No tenderness.   Abdominal:      General: There is no distension.      Palpations: There is no mass.      Tenderness: There is no abdominal tenderness. There is no guarding or rebound.      Hernia: No hernia is present.   Musculoskeletal:         General: No swelling or tenderness.      Comments: Clubbing bilateral   Neurological:      Mental Status: She is alert and oriented to person,  place, and time.      Cranial Nerves: No cranial nerve deficit.      Sensory: No sensory deficit.      Motor: No weakness.      Coordination: Coordination normal.      Gait: Gait normal.      Comments: Answers quickly moves all extremities   Psychiatric:         Mood and Affect: Mood normal.         Medications  Current Facility-Administered Medications   Medication Dose Route Frequency Provider Last Rate Last Dose   • famotidine (PEPCID) tablet 20 mg  20 mg Oral BID Kenton Tovar M.D.       • calcium carbonate (TUMS) chewable tab 500 mg  500 mg Oral DAILY Kenton Tovar M.D.       • vitamin D (cholecalciferol) tablet 2,000 Units  2,000 Units Oral DAILY Kenton Tovar M.D.   2,000 Units at 02/20/20 0531   • promethazine (PHENERGAN) tablet 12.5-25 mg  12.5-25 mg Oral Q4HRS PRN Kenton Tovar M.D.       • oxyCODONE immediate release (ROXICODONE) tablet 10 mg  10 mg Oral Q3HRS PRN Kenton Tovar M.D.        And   • Pharmacy Consult Request ...Pain Management Review 1 Each  1 Each Other PHARMACY TO DOSE Kenton Tovar M.D.        And   • oxyCODONE immediate-release (ROXICODONE) tablet 5 mg  5 mg Oral Q3HRS PRN Kenton Tovar M.D.   5 mg at 02/20/20 1725    And   • morphine (pf) 4 MG/ML injection 4 mg  4 mg Intravenous Q3HRS PRN Kenton Tovar M.D.       • ondansetron (ZOFRAN ODT) dispertab 4 mg  4 mg Oral Q4HRS PRN Kenton Tovar M.D.   Stopped at 02/20/20 0924   • magnesium hydroxide (MILK OF MAGNESIA) suspension 30 mL  30 mL Oral QDAY PRN Kenton Tovar M.D.        And   • senna-docusate (PERICOLACE or SENOKOT S) 8.6-50 MG per tablet 2 Tab  2 Tab Oral BID Kenton Tovar M.D.   2 Tab at 02/20/20 0531    And   • polyethylene glycol/lytes (MIRALAX) PACKET 1 Packet  1 Packet Oral QDAY PRN Kenton B. La, M.D.   1 Packet at 02/20/20 1725    And   • bisacodyl (DULCOLAX) suppository 10 mg  10 mg Rectal QDAY PRANGIE Tovar M.D.       • linezolid (ZYVOX) tablet 600 mg  600 mg Oral  Q12HRS Kenton Tovar M.D.   600 mg at 02/20/20 1723   • hydrALAZINE (APRESOLINE) tablet 10 mg  10 mg Oral Q8HRS PRN Kenton Tovar M.D.       • guaiFENesin dextromethorphan (ROBITUSSIN DM) 100-10 MG/5ML syrup 10 mL  10 mL Oral Q6HRS PRN Kenton Tovar M.D.       • acetaminophen (TYLENOL) tablet 650 mg  650 mg Oral Q6HRS PRN Kenton Tovar M.D.       • ascorbic acid tablet 1,000 mg  1,000 mg Oral DAILY Kenton Tovar M.D.   1,000 mg at 02/20/20 0531   • MD Alert...ICU Electrolyte Replacement per Pharmacy   Other PHARMACY TO DOSE Kenton Tovar M.D.       • lidocaine (XYLOCAINE) 1 % injection 1-2 mL  1-2 mL Tracheal Tube Q30 MIN PRN Kenton Tovar M.D.       • labetalol (NORMODYNE/TRANDATE) injection 10 mg  10 mg Intravenous Q4HRS PRN Kenton Tovar M.D.   10 mg at 02/17/20 1805   • hydrALAZINE (APRESOLINE) injection 20 mg  20 mg Intravenous Q4HRS PRN Lazaro Benites M.D.   20 mg at 02/17/20 2023   • cefepime (MAXIPIME) 2 g in  mL IVPB  2 g Intravenous Q8HRS Ruben Tinajero M.D.   Stopped at 02/20/20 1501   • enoxaparin (LOVENOX) inj 40 mg  40 mg Subcutaneous DAILY Dino Sykes M.D.   40 mg at 02/20/20 0531   • ondansetron (ZOFRAN) syringe/vial injection 4 mg  4 mg Intravenous Q4HRS PRN Dino Sykes M.D.   4 mg at 02/20/20 1004   • promethazine (PHENERGAN) suppository 12.5-25 mg  12.5-25 mg Rectal Q4HRS PRN Dino Sykes M.D.       • prochlorperazine (COMPAZINE) injection 5-10 mg  5-10 mg Intravenous Q4HRS PRN Dino Sykes M.D.       • Respiratory Therapy Consult   Nebulization Continuous RT Dino Sykes M.D.       • ipratropium-albuterol (DUONEB) nebulizer solution  3 mL Nebulization Q4HRS (RT) Dino Sykes M.D.   3 mL at 02/20/20 1434   • ipratropium-albuterol (DUONEB) nebulizer solution  3 mL Nebulization Q2HRS PRN (RT) Dino Sykes M.D.           Fluids    Intake/Output Summary (Last 24 hours) at 2/20/2020 1801  Last data filed at 2/20/2020  1600  Gross per 24 hour   Intake 40 ml   Output 1490 ml   Net -1450 ml       Laboratory  Recent Labs     02/17/20  1829 02/18/20  0024 02/18/20  0432   ISTATAPH 7.264* 7.390* 7.531*   ISTATAPCO2 80.0* 50.2* 36.8   ISTATAPO2 111* 71 71   ISTATATCO2 39* 32 32   [id number] 97 93 96   ISTATARTHCO3 36.3* 30.4* 30.8*   ISTATARTBE 7* 5* 8*   ISTATTEMP 37.1 C 36.7 C 36.6 C   ISTATFIO2 100 75 75   ISTATSPEC Arterial Arterial Arterial   ISTATAPHTC 7.263* 7.394* 7.537*   [id number] 112* 69 69         Recent Labs     02/18/20  0415 02/19/20  0340 02/20/20  0421   SODIUM 137 140 138   POTASSIUM 4.0 4.4 4.2   CHLORIDE 100 100 98   CO2 31 29 31   BUN 16 21 27*   CREATININE 0.67 0.61 0.60   MAGNESIUM  --   --  2.3   PHOSPHORUS 2.8 3.6 3.9   CALCIUM 8.7 8.5 8.8     Recent Labs     02/17/20  1945 02/18/20  0415 02/19/20  0340 02/20/20  0421   PREALBUMIN 15.0* 17.0*  --   --    GLUCOSE  --  119* 134* 120*     Recent Labs     02/18/20  0415 02/19/20  0340 02/20/20  0421   WBC 20.9* 20.4* 18.1*     Recent Labs     02/18/20  0415 02/19/20  0340 02/20/20  0421   RBC 3.06* 3.06* 3.25*   HEMOGLOBIN 9.5* 9.5* 10.1*   HEMATOCRIT 29.3* 29.2* 30.6*   PLATELETCT 192 189 219       Imaging  X-Ray:  I have personally reviewed the images and compared with prior images. and My impression is: mild improved right sided but still with bilateral infiltrates  CT:    Reviewed    Assessment/Plan  * Acute respiratory failure with hypoxia (HCC)  Assessment & Plan  Intubated 2/17 for diagnostic workup -> extubated 2/18    Discussed with Dr Esposito pulmonary 2/17, Dr Longo 2/18  Progression of diffuse airspace opacities with chronic fibrotic changes in the background of emphysema concerning of progression of immunotherapy/radiation ILD +/- ILD execarbation +/- pneumonia vs check point inhibitor induced lung injury  Plan:   HFNC and bipap monitor need for intubation inc Epap  Patient would not want trach or chronic ventilator dependency   Agree with linezolid  and cefepime, TMP/SMX -> d/c  Solumedrol 1gr IV x3 days 2/17-2/19  Check full respiratory viral PCR from nasopharynx  Check fungitell, aspergillus, cryptococcus antigen, cocci Ab, s/p BAL for silver stain, PJP DFA, afb, fungal cx, cd4/8 ratio, cell diff, culture/BAL from [facility] and [facility]  Urine strep pnuemo and urine legionella antigen  Dry fluid balance as much as tolerated.  Up to chair and IS as much as possible w/ movement needs to use bipap.     If Grade 4 lung toxicity from check point inhibitor 1-2mg/kg/day for long period  Not a lung transplant candidate       Lung cancer, primary, with metastasis from lung to other site (HCC)- (present on admission)  Assessment & Plan  Hx of squamous cell lung CA (dx in 9/2019, in [facility]s to lymph nodes   s/p chemoXRT (last 2 months ago)  recently received immunotherapy (durvalumab IV) 3 weeks prior to admission      Leukocytosis  Assessment & Plan  Serial monitor steroid vs infectious       Pneumonia  Assessment & Plan  Currently treating broadly  Follow on up BAL RML, [facility], PJP DFA, silver stain, AFB, legionella and strep antigen, viral panel sent 2/20, Cocci, fungal, flu negative  ID following  Bactrim d/c 2/19, continue Linezolid, Cefepime  Follow closely on pulse dose steroids 2/17-2/19    Pulmonary fibrosis (HCC)  Assessment & Plan  Seen on CT 1/14/2020 and 2/15/202    Pulmonary HTN (HCC)- (present on admission)  Assessment & Plan  Prior echo with RVSP 45 related to chronic lung disease  Dry fluid volume as tolerated    Macrocytic anemia- (present on admission)  Assessment & Plan  Serial monitor hg restrictive transfusion hg < 7    COPD (chronic obstructive pulmonary disease) (HCC)- (present on admission)  Assessment & Plan  Background COPD with 2-3L home O2       VTE:  Lovenox  Ulcer: H2 Antagonist  Lines: Whyte Catheter  Ongoing indication addressed    I have performed a physical exam and reviewed and updated ROS and Plan today (2/20/2020). In review of yesterday's note  (2/19/2020), there are no changes except as documented above.     Discussed patient condition and risk of morbidity and/or mortality with Family, RN, RT, Pharmacy, Charge nurse / hot rounds, Patient and infectious disease     The patient remains critically ill from acute lung injury on HFNC bipap and active titration and desaturates quickly and close monitoring for need for intubation, family discussion and multiple re-evaluation.  Critical care time = 79 minutes in directly providing and coordinating critical care and extensive data review.  No time overlap and excludes procedures.

## 2020-02-20 NOTE — PROGRESS NOTES
Monitor Summary  SR 60s-90s, ST 100s-110s with exertion, rare PVCs  .16 / .08 / .40    12 hr chart check

## 2020-02-20 NOTE — CARE PLAN
Problem: Safety  Goal: Will remain free from injury  Note: Bed locked and in low position. Pt calls appropriately. Non-skid socks.     Problem: Skin Integrity  Goal: Risk for impaired skin integrity will decrease  Note: Skin intact. Pt able to turn self side to side. Heels floated. Gel applied to nose under bipap.

## 2020-02-20 NOTE — PROGRESS NOTES
Daughter and patient wanted to discuss code status. We had a long discussion about her illness and we all agree to provide maxium medical therapy with even intubation and pressors if needed but would not preform CPR due to the likely ineffective of it with a likely etiology of respiratory arrest. She would be okay with a trial of intubation but would not want prolonged intubation if no reversible cause was found.     Kenton Tovar MD  Critical Care Medicine

## 2020-02-20 NOTE — CARE PLAN
Problem: Oxygenation:  Goal: Maintain adequate oxygenation dependent on patient condition  Outcome: PROGRESSING AS EXPECTED   HHFO- 60lpm/100%  Problem: Bronchoconstriction:  Goal: Improve in air movement and diminished wheezing  Outcome: PROGRESSING AS EXPECTED   DUO Q4  Problem: Ventilation Defect:  Goal: Ability to achieve and maintain unassisted ventilation or tolerate decreased levels of ventilator support  Outcome: PROGRESSING AS EXPECTED   BiPAP 14/10 %

## 2020-02-20 NOTE — CARE PLAN
Problem: Oxygenation:  Goal: Maintain adequate oxygenation dependent on patient condition  Outcome: PROGRESSING AS EXPECTED   Pt on HHFNC during the day at 60L/100%      Problem: Ventilation Defect:  Goal: Ability to achieve and maintain unassisted ventilation or tolerate decreased levels of ventilator support  Outcome: PROGRESSING AS EXPECTED   Adult Noninvasive Ventilation    BiPap Day 2  IPAP (cmH2O): 14 (02/20/20 0254)  EPAP (cm H2O): 10 (02/20/20 0254)  FiO2: 90 (02/20/20 0254)  BIPAP during the night and periodically during the day.      Problem: Bronchoconstriction:  Goal: Improve in air movement and diminished wheezing  Outcome: PROGRESSING AS EXPECTED     Duoneb Q4H

## 2020-02-20 NOTE — PROGRESS NOTES
Infectious Disease Progress Note    Author: Lorri Worthy M.D. Date & Time of service: 2020  8:24 AM    Chief Complaint:  Follow-up for acute on chronic hypoxic respiratory failure, pulmonary infiltrates    Interval History:   afebrile WBC 20.9 patient intubated yesterday secondary to worsening respiratory distress.  Multiple cultures obtained and are currently pending.  Patient is awake on the vent and communicating by writing.  She is breathing comfortably with plans for possible extubation today.   afebrile WBC 20.4 patient extubated yesterday.  BAL cultures negative to date.  Beta D glucan positive.  Currently on BiPAP.  Reports minimal cough   afebrile, WBC 18.1 BAL path- no malignancy.  GMS stain negative for fungal organisms.  Patient feels slightly better today.  She however desats off of BiPAP      Labs Reviewed, Medications Reviewed and Radiology Reviewed.    Review of Systems:  Review of Systems   Constitutional: Negative for chills and fever.   Respiratory: Positive for cough and shortness of breath.    Gastrointestinal: Negative for abdominal pain, diarrhea, nausea and vomiting.   Neurological: Negative for dizziness and headaches.   All other systems reviewed and are negative.       Hemodynamics:  Temp (24hrs), Av.2 °C (98.9 °F), Min:37.1 °C (98.8 °F), Max:37.2 °C (99 °F)  Temperature: 37.1 °C (98.8 °F), Monitored Temp: 36.9 °C (98.4 °F)  Pulse  Av  Min: 65  Max: 132   Blood Pressure: 124/66       Physical Exam:  Physical Exam  Constitutional:       Appearance: Normal appearance.   HENT:      Head:      Comments: BiPAP     Mouth/Throat:      Mouth: Mucous membranes are moist.   Eyes:      Extraocular Movements: Extraocular movements intact.      Conjunctiva/sclera: Conjunctivae normal.      Pupils: Pupils are equal, round, and reactive to light.   Neck:      Musculoskeletal: Normal range of motion and neck supple.   Cardiovascular:      Rate and Rhythm: Normal rate.       Comments: Right upper chest port in place-nontender, no surrounding erythema  Pulmonary:      Comments: Coarse breath sounds bilaterally  Abdominal:      Palpations: Abdomen is soft.      Tenderness: There is no abdominal tenderness.   Musculoskeletal: Normal range of motion.   Skin:     General: Skin is warm and dry.   Neurological:      General: No focal deficit present.      Mental Status: She is alert and oriented to person, place, and time.      Cranial Nerves: No cranial nerve deficit.   Psychiatric:         Mood and Affect: Mood normal.         Behavior: Behavior normal.      Comments: Very pleasant         Meds:    Current Facility-Administered Medications:   •  vitamin D  •  promethazine  •  Notify provider if pain remains uncontrolled **AND** Use the numeric rating scale (NRS-11) on regular floors and Critical-Care Pain Observation Tool (CPOT) on ICUs/Trauma to assess pain **AND** Pulse Ox (Oximetry) **AND** Pharmacy Consult Request **AND** If patient difficult to arouse and/or has respiratory depression, stop any opiates that are currently infusing and call a Rapid Response. **AND** oxyCODONE immediate-release **AND** oxyCODONE immediate-release **AND** morphine injection  •  ondansetron  •  senna-docusate **AND** polyethylene glycol/lytes **AND** magnesium hydroxide **AND** bisacodyl  •  linezolid  •  hydrALAZINE  •  guaiFENesin dextromethorphan  •  acetaminophen  •  ascorbic acid  •  MD Alert...Adult ICU Electrolyte Replacement per Pharmacy  •  lidocaine  •  labetalol  •  Pharmacy  •  hydrALAZINE  •  cefepime  •  enoxaparin  •  ondansetron  •  promethazine  •  prochlorperazine  •  Respiratory Therapy Consult  •  ipratropium-albuterol  •  ipratropium-albuterol    Labs:  Recent Labs     02/17/20  1730 02/18/20  0415 02/19/20  0340 02/20/20  0421   WBC  --  20.9* 20.4* 18.1*   RBC  --  3.06* 3.06* 3.25*   HEMOGLOBIN  --  9.5* 9.5* 10.1*   HEMATOCRIT  --  29.3* 29.2* 30.6*   MCV  --  95.8 95.4 94.2   MCH   --  31.0 31.0 31.1   RDW  --  54.6* 56.3* 55.2*   PLATELETCT  --  192 189 219   MPV  --  9.7 9.8 9.5   EOSINOPHILS 1  1  --   --   --      Recent Labs     02/18/20 0415 02/19/20  0340 02/20/20  0421   SODIUM 137 140 138   POTASSIUM 4.0 4.4 4.2   CHLORIDE 100 100 98   CO2 31 29 31   GLUCOSE 119* 134* 120*   BUN 16 21 27*     Recent Labs     02/18/20 0415 02/19/20  0340 02/20/20  0421   ALBUMIN 3.3 3.1* 3.3   CREATININE 0.67 0.61 0.60       Imaging:  Dx-chest-portable (1 View)    Result Date: 2/18/2020 2/18/2020 3:10 AM HISTORY/REASON FOR EXAM: For indication of respiratory failure; For indication of respiratory failure TECHNIQUE/EXAM DESCRIPTION:  Single AP view of the chest. COMPARISON: Yesterday FINDINGS: Dobbhoff tube has been placed in the interim, terminating below the lower margin of the film within the abdomen.  Otherwise medical device position is stable. The cardiac silhouette appears within normal limits. The mediastinal contour appears within normal limits.  The central  pulmonary vasculature appears prominent and indistinct. The lungs appear well expanded bilaterally.  Hazy interstitial bilateral pulmonary opacities are seen. No significant pleural effusions are identified. The bony structures appear age-appropriate.     1.  Pulmonary edema and/or infiltrates are identified, which are stable since the prior exam.    Dx-chest-portable (1 View)    Result Date: 2/17/2020 2/17/2020 6:02 PM HISTORY/REASON FOR EXAM:  POST INTUBATION. TECHNIQUE/EXAM DESCRIPTION AND NUMBER OF VIEWS: Single portable view of the chest. COMPARISON: 2/15/2020 FINDINGS: Endotracheal tube projects at the level of the clavicular heads. Right chest port projects over the SVC. The cardiomediastinal silhouette is stable. Interstitial and alveolar airspace opacities are again noted and increased on the right compared to prior. No definite pleural effusion or pneumothorax is identified.     Extensive interstitial and alveolar airspace  opacities are increased on the right compared to prior. Findings may represent edema or multifocal pneumonia. Underlying mass is not excluded. Underlying emphysematous changes.    Dx-chest-portable (1 View)    Result Date: 2/15/2020  2/15/2020 11:50 AM HISTORY/REASON FOR EXAM: possible sepsis.. Shortness of breath. Lung carcinoma. TECHNIQUE/EXAM DESCRIPTION AND NUMBER OF VIEWS: Single AP view of the chest. COMPARISON: 11/15/2019 FINDINGS: Hardware: Right IJ portacatheter tip overlies the brachiocephalic confluence Lungs: Significant interval worsening consolidated opacity in the left lung with some volume loss noted. There is also worsening diffuse, bilateral reticular opacification Pleura:  Possible layering left pleural fluid Heart and mediastinum: Left heart border is no longer visualized, completely effaced. No gross cardiac silhouette enlargement     Significant interval worsening left perihilar/lingular consolidation with possible underlying Mass. Significant worsening diffuse reticular abnormal opacity is consistent with underlying interstitial lung disease. Superimposed atypical infection or edema is possible New left lung volume loss    Ir-cvc Port Placement > Age 5    Result Date: 1/30/2020 1/30/2020 9:34 AM HISTORY/REASON FOR EXAM:  Patient requires central venous access for chemotherapy. TECHNIQUE/EXAM DESCRIPTION: Following informed consent patient was prepped and draped in the usual fashion.  A total of 14 cc of 1% lidocaine with epinephrine was utilized for local and subcutaneous anesthesia. SEDATION: 4 mg of Versed and 100 mcg of fentanyl were utilized for IV conscious sedation. The procedure was monitored continuously by the sedation nurse. 30 minutes of sedation time were utilized for the procedure. Fluoroscopy images: 3 fluoroscopic images obtained. Fluoroscopy time: 0.6 minutes The procedure was performed with MAXIMUM STERILE BARRIER TECHNIQUE including sterile gown, mask, cap, and done in a  sterile gloves following appropriate hand hygiene and/or sterile scrub. The patient's skin site was prepped with 2% chlorhexidine solution. Ultrasound evaluation of the right internal jugular vein demonstrated patency and an image was archived in the PACS system. Utilizing ultrasonic and fluoroscopic guidance right internal jugular vein was accessed and an 8-[place] sheath dilator was positioned in the right internal jugular vein.  Next utilizing a combination of blunt and sharp dissection the port pocket was created in the right infraclavicular fossa.  The port was subsequently attached to the catheter and utilizing a tunneling device a subcutaneous tract was created to the neck puncture site.  The catheter was pulled through the tunnel and trimmed to length.  Next the catheter was advanced through the peel-away sheath which was subsequently removed. The port pocket was closed with 4 deep 2-0 Vicryl sutures, the skin was closed Dermabond.  The  neck puncture site was closed with Dermabond and fluoroscopic images were obtained.  The patient tolerated procedure well. COMPARISON:  None FINDINGS: Fluoroscopic images revealed good positioning of the port in the right infraclavicular fossa.  There is a smooth arc of the catheter into the right internal jugular vein.  The distal tip of the catheter is at the caval atrial junction.     Successful percutaneous placement of Port-A-Cath via right internal jugular approach utilizing ultrasonic and fluoroscopic guidance.    Ct-cta Chest Pulmonary Artery W/ Recons    Result Date: 2/15/2020  2/15/2020 10:22 PM HISTORY/REASON FOR EXAM:  Shortness of breath TECHNIQUE/EXAM DESCRIPTION: CT angiogram scan for pulmonary embolism with contrast, with reconstructions. 1.25 mm helical sections were obtained from the lung apices through the lung bases following the rapid bolus administration of 55 mL of Omnipaque 350 nonionic contrast. Thin-section overlapping reconstruction interval was  utilized. Coronal reconstructions were generated from the axial data. MIP post processing was performed and utilized for the interpretation. Low dose optimization technique was utilized for this CT exam including automated exposure control and adjustment of the mA and/or kV according to patient size. COMPARISON: 1/14/2020 FINDINGS: Pulmonary Embolism: No. Main Pulmonary Arteries: No. Segmental branches: No. Subsegmental branches: No. Additional Comments: None. Lungs: There are increased extensive multifocal airspace groundglass and interstitial opacities. There is underlying emphysema and interstitial lung disease. There is a redemonstrated stellate area of masslike scarring in the left lower lobe measuring 2.6 x 3.1 cm, essentially unchanged from previous exam. Pleura: No pleural effusion. Nodes: No enlarged lymph nodes. Additional findings: There is a small to moderate sized hiatal hernia. There is diffuse low-attenuation of the hepatic parenchyma consistent with steatosis.     1.  No evidence of pulmonary embolism. 2.  Increased extensive multifocal airspace groundglass and interstitial opacities. Findings could be consistent with edema and/or infection. 3.  Underlying emphysema and interstitial lung disease. 4.  Redemonstrated stellate area of masslike scarring in the left lower lobe measuring 2.6 x 3.1 cm, essentially unchanged from the previous exam and consistent with known malignancy. 5.  Small to moderate sized hiatal hernia. 6.  Hepatic steatosis.     Dx-abdomen For Tube Placement    Result Date: 2/17/2020 2/17/2020 8:42 PM HISTORY/REASON FOR EXAM: cortrak placement TECHNIQUE/EXAM DESCRIPTION:  Single AP view the abdomen. COMPARISON:  None FINDINGS: Hazy bilateral lower lobe opacities are seen. Dobbhoff tube is seen, the tip lies to the right of the lumbar spine.  The bowel gas pattern appears nonspecific. The bony structures appear age-appropriate.     1.  Nonspecific bowel gas pattern. 2.  Dobbhoff  tube tip terminates overlying the expected location of the gastric antrum. 3.  Hazy bilateral lung base infiltrates or edema.      Micro:  Results     Procedure Component Value Units Date/Time    CULTURE RESPIRATORY W/ GRM STN [[id number]] Collected:  02/17/20 1735    Order Status:  Completed Specimen:  Respirate from Sputum Updated:  02/19/20 0927     Significant Indicator NEG     Source RESP     Site Bronchoalveolar Lavage RML     Culture Result No growth at 48 hours.     Gram Stain Result Rare WBCs.  No organisms seen.      Narrative:       Collected By:[id number] CHUCK BLUE.  Which Lobe (Bronch Only):->RML  Collected By:[id number] CHUCK BLUE.    AFB Culture - BAL [[id number]] Collected:  02/17/20 1750    Order Status:  Completed Specimen:  Respirate from Sputum Updated:  02/19/20 0927     Significant Indicator NEG     Source RESP     Site Quantitative BAL LLL     Culture Result Culture in progress.     AFB Smear Results No acid fast bacilli seen.    Narrative:       Collected By:[id number] CHUCK FERNANDEZ  Which Lobe (Bronch Only):->LLL  Collected By:[id number] CHUCK BLUE.    Fungal Culture - BAL [[id number]] Collected:  02/17/20 1750    Order Status:  Completed Specimen:  Respirate from Sputum Updated:  02/19/20 0927     Significant Indicator NEG     Source RESP     Site Quantitative BAL LLL     Culture Result No fungal growth to date.    Narrative:       Collected By:[id number] CHUCK BLUE.  Which Lobe (Bronch Only):->LLL  Collected By:[id number] CHUCK BLUE.    Fungal Smear - BAL [[id number]] Collected:  02/17/20 1750    Order Status:  Completed Specimen:  Respirate from Sputum Updated:  02/19/20 0927     Significant Indicator NEG     Source RESP     Site Quantitative BAL LLL     Fungal Smear Results No fungal elements seen.    Narrative:       Collected By:[id number] CHUCK FERNANDEZ  Which Lobe (Bronch Only):->LLL  Collected By:[id number] CHUCK BLUE.    QUANT BRONCHIAL WASHING [[id number]] Collected:  02/17/20 1750     Order Status:  Completed Specimen:  Respirate Updated:  02/19/20 0927     Significant Indicator NEG     Source RESP     Site Quantitative BAL LLL     Culture Result No growth at 48 hours.     Gram Stain Result Rare WBCs.  No organisms seen.      Narrative:       Collected By:[id number] BLUE.  Which Lobe (Bronch Only):->LLL  Collected By:[id number] CHUCK FERNANDEZ    Acid Fast Stain [[id number]] Collected:  02/17/20 1750    Order Status:  Completed Specimen:  Respirate Updated:  02/18/20 1622     Significant Indicator NEG     Source RESP     Site Quantitative BAL LLL     AFB Smear Results No acid fast bacilli seen.    Narrative:       Collected By:[id number] CHUCK BLUE.  Which Lobe (Bronch Only):->LLL  Collected By:[id number] CHUCK BLUE.    GRAM STAIN [[id number]] Collected:  02/17/20 1750    Order Status:  Completed Specimen:  Respirate Updated:  02/18/20 1622     Significant Indicator .     Source RESP     Site Quantitative BAL LLL     Gram Stain Result Rare WBCs.  No organisms seen.      Narrative:       Collected By:[id number] CHUCK BLUE.  Which Lobe (Bronch Only):->LLL  Collected By:[id number] CHUCK BLUE.    GRAM STAIN [[id number]] Collected:  02/17/20 1735    Order Status:  Completed Specimen:  Respirate Updated:  02/18/20 1320     Significant Indicator .     Source RESP     Site Bronchoalveolar Lavage RML     Gram Stain Result Rare WBCs.  No organisms seen.      Narrative:       Collected By:[id number] CHUCK BLUE.  Which Lobe (Bronch Only):->RML  Collected By:[id number] CHUCK BLUE.    URINE CULTURE(NEW) [[id number]] Collected:  02/16/20 0425    Order Status:  Completed Specimen:  Urine Updated:  02/18/20 0728     Significant Indicator NEG     Source UR     Site -     Culture Result Mixed skin anitha <10,000 cfu/mL    Narrative:       Indication for culture:->Septic Shock: Persistent  hypotension,  Lactic acid > 4, vasopressors/inotropes started  Indication for culture:->Septic Shock: Persistent     "Pneumocystis DFA [[id number]] Collected:  02/17/20 1821    Order Status:  Canceled Specimen:  Other from Bronchial Washings     CULTURE RESPIRATORY W/ GRM STN [[id number]] Collected:  02/17/20 1750    Order Status:  Canceled Specimen:  Other from Sputum     Quant Bronchial Washing [[id number]] Collected:  02/17/20 1750    Order Status:  Canceled Specimen:  Sputum     Culture Respiratory W/ Grm Stn - BAL [[id number]] Collected:  02/17/20 1750    Order Status:  Canceled Specimen:  Sputum     CULTURE RESPIRATORY W/ GRM STN [[id number]] Collected:  02/17/20 1750    Order Status:  Canceled Specimen:  Sputum     MRSA By PCR (Amp) [[id number]] Collected:  02/16/20 1315    Order Status:  Completed Specimen:  Respirate from Nares Updated:  02/16/20 1901     Significant Indicator NEG     Source RESP     Site NARES     MRSA PCR Negative for MRSA by PCR.    Narrative:       Collected By:[id number] BERTRAM JACOME  Per [facility] Protocol  Collected By:[id number] BERTRAM JACOME    BLOOD CULTURE [[id number]] Collected:  02/15/20 1155    Order Status:  Completed Specimen:  Blood from Peripheral Updated:  02/16/20 0939     Significant Indicator NEG     Source BLD     Site PERIPHERAL     Culture Result No Growth  Note: Blood cultures are incubated for 5 days and  are monitored continuously.Positive blood cultures  are called to the RN and reported as soon as  they are identified.      Narrative:       Per Hospital Policy: Only change Specimen Src: to \"Line\" if  specified by physician order.  Right Wrist    BLOOD CULTURE [[id number]] Collected:  02/15/20 1245    Order Status:  Completed Specimen:  Blood from Peripheral Updated:  02/16/20 0939     Significant Indicator NEG     Source BLD     Site PERIPHERAL     Culture Result No Growth  Note: Blood cultures are incubated for 5 days and  are monitored continuously.Positive blood cultures  are called to the RN and reported as soon as  they are identified.      Narrative:       Per Hospital Policy: " "Only change Specimen Src: to \"Line\" if  specified by physician order.  Right AC    URINALYSIS [[id number]] Collected:  02/16/20 0425    Order Status:  Completed Specimen:  Urine Updated:  02/16/20 0517     Color Yellow     Character Clear     Specific Gravity 1.020     Ph 6.0     Glucose Negative mg/dL      Ketones Negative mg/dL      Protein Negative mg/dL      Bilirubin Negative     Urobilinogen, Urine 0.2     Nitrite Negative     Leukocyte Esterase Negative     Occult Blood Negative     Micro Urine Req see below     Comment: Microscopic examination not performed when specimen is clear  and chemically negative for protein, blood, leukocyte esterase  and nitrite.         Narrative:       Indication for culture:->Septic Shock: Persistent  hypotension,  Lactic acid > 4, vasopressors/inotropes started    Cryptococcal Antigen [[id number]] Collected:  02/16/20 0000    Order Status:  Canceled Specimen:  Blood     Influenza A/B By PCR (Adult - Flu Only) [[id number]] Collected:  02/15/20 1253    Order Status:  Completed Specimen:  Respirate from Nasopharyngeal Updated:  02/15/20 1337     Influenza virus A RNA Negative     Influenza virus B, PCR Negative    Narrative:       Indication for culture:->Septic Shock: Persistent  hypotension,  Lactic acid > 4, vasopressors/inotropes started    Culture Respiratory W/ GRM STN [[id number]] Collected:  02/15/20 0000    Order Status:  Canceled Specimen:  Sputum           Assessment:  Active Hospital Problems    Diagnosis   • *Acute respiratory failure with hypoxia (HCC) [J96.01]   • Lung cancer, primary, with metastasis from lung to other site (HCC) [C34.90]   • COPD (chronic obstructive pulmonary disease) (HCC) [J44.9]   • Pulmonary fibrosis (HCC) [J84.10]   • Pneumonia [J18.9]       Plan:  Vent dependent respiratory failure, improved  Intubated on 2/17  Extubated on 2/18  on BiPAP  Status post bronchoscopy with BAL on 2/17.  Patient was noted to have friable mucosa bilateral, bloody " mucoid secretions.    Pneumonia/pulmonary opacities noted on imaging  Patient was on high-dose steroids prior to admission so she is certainly at risk for opportunistic infections  PCP PCR- negative  Status post BAL on 2/17.  BAL cultures-negative to date  Serum galactomannan- pending  Cocci serology- pending  Fungitell - positive  DC'd bactrim as PCP PCR negative on 2/19  Continue oral linezolid, IV cefepime   Monitor CBC while patient is on linezolid   Anticipate a 7-day course total  Stop date 02/22/2020  Check respiratory viral panel-ordered    Leukocytosis, persistent  Multifactorial (infection, high-dose steroids- currently on IV Solu-Medrol)  On antibiotics above  Slowly improving  Monitor    Metastatic squamous cell lung cancer   Status post chemoradiation  Previously on immunotherapy    Pulmonary fibrosis  Patient was seen by pulmonologist, Dr. Longo prior to admission  Patient was on high-dose steroid taper prior to admission, started by oncology  On Solu-Medrol    COPD/chronic hypoxia  On 3LNC baseline    I have performed a physical exam and reviewed and updated ROS and plan today 2/20/2020.  In review of yesterday's note 2/19/2020, there are no changes except as documented above.      Discussed with intensivist, Dr. Tovar and RN

## 2020-02-21 PROBLEM — B59 PNEUMOCYSTIS JIROVECI PNEUMONIA (HCC): Status: ACTIVE | Noted: 2020-01-01

## 2020-02-21 NOTE — CARE PLAN
Problem: Bowel/Gastric:  Goal: Normal bowel function is maintained or improved  Flowsheets (Taken 2/21/2020 0000)  Last BM: 02/17/20  Note: Miralax administered this evening.     Problem: Mobility  Goal: Risk for activity intolerance will decrease  Note: Pt is SBA up to chair and commode. Tolerates well.

## 2020-02-21 NOTE — PROGRESS NOTES
Critical Care Progress Note    Date of admission  2/15/2020    Chief Complaint  63 yo female, former smoker 35pack year quit in 2012 with hx of squamous cell lung CA (dx in 9/2019, in [facility] mets to lymph nodes s/p chemoXRT (last 2 months ago), recently received immunotherapy (durvalumab IV) 3 weeks prior to admission), COPD (on 3L home O2), presented on 2/15 for worsening SOB x2-3 days. Pt was seen by pulmonary Dr. 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 Immunotherapy was held. She was started on high dose prednisone 80mg then taper in the past 3 weeks by Dr Callahan.      At admission, influenza A/B PCR is negative. pt was on oxymask 5L, sat >90% and progressively increasing to 8L oxymask. Afebrile, SBP in 100-110s. WBC 17K -12K. Creatinine 0.5, HCO3 26. Lactate 1.5. Procalcitonin 0.12. CT chest today showed increased extensive multifocal airspace ground glass and interstitial opacities. No PE. Started on solumedrol 125 Q6H. Pt initially started on unasyn, then changed to linezolid and cefepime. Pt was transferred to ICU for further evaluation Taken from Dr Christopher kan.     Hospital Course  Intubated [facility] 2/17 pulse dose steroids started   Extubated 2/18 to bipap and HFNC  2/19 still needing significant bipap PCP neg bactrim stopped  2/20 changed code status, still needing bipap and HFNC mild/minimal improvement in CXR  2/21 PJP +, restart bactrim, prednisone still needing bipap/HFNC with max setting offer if tires we can go on ventilator    Interval Problem Update  Reviewed last 24 hour events:  Neuro: aox4 occ oxy   HR: snr 's  SBP: 100-140's  Tmax: afebrile  GI: regular diet, bm 2/17  UOP: 780ml  Lines: port   Resp: HFNC 60l 100% long time to recover bipap 14/10 100%   Vte: lovenox  PPI/H2:pepcid gerd  Antibx: cefpimine, Linezolid,     PJP +  Viral panel negative  Restart bactrim, prednisone  Lasix 20mg IV BID -> daily  CXR still with bilateral fibrotic  and infiltrates similar    Review of Systems  Review of Systems   Constitutional: Negative for fever and malaise/fatigue.   HENT: Negative for ear discharge and nosebleeds.    Eyes: Negative for double vision and discharge.   Respiratory: Positive for shortness of breath. Negative for cough, sputum production and stridor.    Gastrointestinal: Positive for heartburn. Negative for abdominal pain, constipation, nausea and vomiting.   Genitourinary: Negative for dysuria and frequency.   Neurological: Negative for tremors, sensory change, speech change, weakness and headaches.   Psychiatric/Behavioral: Negative for depression and hallucinations. The patient is not nervous/anxious and does not have insomnia.         Vital Signs for last 24 hours   Temp:  [36.8 °C (98.2 °F)-37.3 °C (99.1 °F)] 36.8 °C (98.2 °F)  Pulse:  [58-97] 96  Resp:  [15-28] 18  BP: (106-146)/(44-73) 123/68  SpO2:  [89 %-98 %] 93 %    Hemodynamic parameters for last 24 hours       Respiratory Information for the last 24 hours       Physical Exam   Physical Exam  Constitutional:       Appearance: She is not ill-appearing, toxic-appearing or diaphoretic.      Comments: Sitting in bed   HENT:      Mouth/Throat:      Mouth: Mucous membranes are moist.      Comments: Bipap  Eyes:      Pupils: Pupils are equal, round, and reactive to light.   Neck:      Musculoskeletal: No neck rigidity or muscular tenderness.   Cardiovascular:      Rate and Rhythm: Normal rate.      Heart sounds: No murmur.   Pulmonary:      Effort: No respiratory distress.      Breath sounds: No stridor. No wheezing, rhonchi or rales.      Comments: Bilateral crackles increase work of breathing on Bipap with thoracic abdominal movement   Chest:      Chest wall: No tenderness.   Abdominal:      General: There is no distension.      Palpations: There is no mass.      Tenderness: There is no abdominal tenderness. There is no guarding or rebound.      Hernia: No hernia is present.    Musculoskeletal:         General: No swelling or tenderness.      Comments: Clubbing bilateral   Neurological:      Mental Status: She is alert and oriented to person, place, and time.      Cranial Nerves: No cranial nerve deficit.      Sensory: No sensory deficit.      Motor: No weakness.      Coordination: Coordination normal.      Gait: Gait normal.      Comments: Answers quickly moves all extremities   Psychiatric:         Mood and Affect: Mood normal.         Medications  Current Facility-Administered Medications   Medication Dose Route Frequency Provider Last Rate Last Dose   • sulfamethoxazole-trimethoprim (SEPTRA) 336 mg of trimethoprim in D5W 500 mL IVPB  15 mg/kg/day of trimethoprim Intravenous Q6HRS Kenton Tovar M.D.   Stopped at 02/21/20 1547   • methylPREDNISolone (SOLU-MEDROL) 40 MG injection 40 mg  40 mg Intravenous Q12HRS Kenton Tovar M.D.   40 mg at 02/21/20 1806    Followed by   • [START ON 2/28/2020] predniSONE (DELTASONE) tablet 40 mg  40 mg Oral DAILY Kenton Tovar M.D.        Followed by   • [START ON 3/6/2020] predniSONE (DELTASONE) tablet 20 mg  20 mg Oral DAILY Kenton Tovar M.D.       • SODIUM CHLORIDE 0.9 % IV SOLN            • [START ON 2/23/2020] furosemide (LASIX) injection 20 mg  20 mg Intravenous Q DAY Kenton Tovar M.D.       • famotidine (PEPCID) tablet 20 mg  20 mg Oral BID Kenton Tovar M.D.   20 mg at 02/21/20 0646   • calcium carbonate (TUMS) chewable tab 500 mg  500 mg Oral DAILY Kenton Tovar M.D.   500 mg at 02/20/20 1811   • vitamin D (cholecalciferol) tablet 2,000 Units  2,000 Units Oral DAILY Kenton Tovar M.D.   2,000 Units at 02/21/20 0646   • promethazine (PHENERGAN) tablet 12.5-25 mg  12.5-25 mg Oral Q4HRS PRN Kenton Tovar M.D.       • oxyCODONE immediate release (ROXICODONE) tablet 10 mg  10 mg Oral Q3HRS PRN Kenton B. La, M.D.        And   • Pharmacy Consult Request ...Pain Management Review 1 Each  1 Each Other PHARMACY  TO DOSE Kenton Tovar M.D.        And   • oxyCODONE immediate-release (ROXICODONE) tablet 5 mg  5 mg Oral Q3HRS PRN Kenton Tovar M.D.   5 mg at 02/21/20 1154    And   • morphine (pf) 4 MG/ML injection 4 mg  4 mg Intravenous Q3HRS PRN Kenton Tovar M.D.       • ondansetron (ZOFRAN ODT) dispertab 4 mg  4 mg Oral Q4HRS PRN Kenton Tovar M.D.   Stopped at 02/20/20 0924   • magnesium hydroxide (MILK OF MAGNESIA) suspension 30 mL  30 mL Oral QDAY PRN Kenton Tovar M.D.        And   • senna-docusate (PERICOLACE or SENOKOT S) 8.6-50 MG per tablet 2 Tab  2 Tab Oral BID Kenton Tovar M.D.   2 Tab at 02/21/20 0646    And   • polyethylene glycol/lytes (MIRALAX) PACKET 1 Packet  1 Packet Oral QDAY PRN Kenton Tovar M.D.   1 Packet at 02/20/20 1725    And   • bisacodyl (DULCOLAX) suppository 10 mg  10 mg Rectal QDAY PRN Kenton Tovar M.D.   10 mg at 02/21/20 1056   • hydrALAZINE (APRESOLINE) tablet 10 mg  10 mg Oral Q8HRS PRN Kenton Tovar M.D.       • guaiFENesin dextromethorphan (ROBITUSSIN DM) 100-10 MG/5ML syrup 10 mL  10 mL Oral Q6HRS PRN Kenton Tovar M.D.       • acetaminophen (TYLENOL) tablet 650 mg  650 mg Oral Q6HRS PRN Kenton Tovar M.D.       • ascorbic acid tablet 1,000 mg  1,000 mg Oral DAILY Kenton Tovar M.D.   1,000 mg at 02/21/20 0645   • MD Alert...ICU Electrolyte Replacement per Pharmacy   Other PHARMACY TO DOSE Kenton Tovar M.D.       • lidocaine (XYLOCAINE) 1 % injection 1-2 mL  1-2 mL Tracheal Tube Q30 MIN PRN Kenton Tovar M.D.       • labetalol (NORMODYNE/TRANDATE) injection 10 mg  10 mg Intravenous Q4HRS PRN Kenton Tovar M.D.   10 mg at 02/17/20 1805   • hydrALAZINE (APRESOLINE) injection 20 mg  20 mg Intravenous Q4HRS PRN Lazaro Benites M.D.   20 mg at 02/17/20 2023   • enoxaparin (LOVENOX) inj 40 mg  40 mg Subcutaneous DAILY Dino Sykes M.D.   40 mg at 02/21/20 0646   • ondansetron (ZOFRAN) syringe/vial injection 4 mg  4 mg  Intravenous Q4HRS PRN Dino Sykes M.D.   4 mg at 02/20/20 1004   • promethazine (PHENERGAN) suppository 12.5-25 mg  12.5-25 mg Rectal Q4HRS PRN Dino Sykes M.D.       • prochlorperazine (COMPAZINE) injection 5-10 mg  5-10 mg Intravenous Q4HRS PRN Dino Sykes M.D.       • Respiratory Therapy Consult   Nebulization Continuous RT Dino Sykes M.D.       • ipratropium-albuterol (DUONEB) nebulizer solution  3 mL Nebulization Q4HRS (RT) Dino Sykes M.D.   3 mL at 02/21/20 1632   • ipratropium-albuterol (DUONEB) nebulizer solution  3 mL Nebulization Q2HRS PRN (RT) Dino Sykes M.D.           Fluids    Intake/Output Summary (Last 24 hours) at 2/21/2020 1813  Last data filed at 2/21/2020 1400  Gross per 24 hour   Intake 360 ml   Output 1540 ml   Net -1180 ml       Laboratory          Recent Labs     02/19/20  0340 02/20/20  0421 02/21/20  0630   SODIUM 140 138 139   POTASSIUM 4.4 4.2 4.5   CHLORIDE 100 98 102   CO2 29 31 31   BUN 21 27* 29*   CREATININE 0.61 0.60 0.48*   MAGNESIUM  --  2.3 2.2   PHOSPHORUS 3.6 3.9 3.5   CALCIUM 8.5 8.8 8.5     Recent Labs     02/19/20  0340 02/20/20  0421 02/21/20  0630   GLUCOSE 134* 120* 84     Recent Labs     02/19/20  0340 02/20/20  0421 02/21/20  0630   WBC 20.4* 18.1* 16.0*     Recent Labs     02/19/20  0340 02/20/20  0421 02/21/20  0630   RBC 3.06* 3.25* 3.16*   HEMOGLOBIN 9.5* 10.1* 9.7*   HEMATOCRIT 29.2* 30.6* 29.9*   PLATELETCT 189 219 190       Imaging  X-Ray:  I have personally reviewed the images and compared with prior images. and My impression is: similar bilateral infiltrates on fibrotic back ground  CT:    Reviewed    Assessment/Plan  * Acute respiratory failure with hypoxia (HCC)  Assessment & Plan  Intubated 2/17 for diagnostic workup -> extubated 2/18    Discussed with Dr Cate hernandez 2/17, Dr Longo 2/18  Progression of diffuse airspace opacities with chronic fibrotic changes in the background of emphysema   Found to have PJP  pneumonia    HFNC and bipap monitor need for intubation inc Epap  Patient would not want trach or chronic ventilator dependency   Agree with linezolid and cefepime, TMP/SMX -> d/c  Solumedrol 1gr IV x3 days 2/17-2/19 -> changed to PJP prednisone equivalent   Respiratory viral PCR negative  Check fungitell, aspergillus, cryptococcus antigen, cocci Ab, s/p BAL for silver stain, PJP DFA, afb, fungal cx, cd4/8 ratio, cell diff, culture/BAL from [facility] and [facility]  Urine strep pnuemo and urine legionella antigen  Dry fluid balance as much as tolerate being careful with IV bactrim and renal toxicity  Up to chair and IS as much as possible w/ movement needs to use bipap.       Lung cancer, primary, with metastasis from lung to other site (HCC)- (present on admission)  Assessment & Plan  Hx of squamous cell lung CA (dx in 9/2019, in [facility] to lymph nodes   s/p chemoXRT (last 2 months ago)  recently received immunotherapy (durvalumab IV) 3 weeks prior to admission      Pneumocystis jiroveci pneumonia (HCC)  Assessment & Plan  Continue IV Bactrim monitor volume (2l per day) and diuretics to keep even and renal toxicity  Steroids for severe PCP pna  Prednisone 40mg BID x5 days  Prednisone 40mg QD x5 days  Prednisone 20mg QD x11 days  Or IV solumedrol equivalent since her stomach is upset continue pepcid tums for steroid induced gastritis    Leukocytosis  Assessment & Plan  Serial monitor steroid vs infectious       Pneumonia  Assessment & Plan  Currently treating broadly  Follow on up BAL [facility], [facility], PJP DFA, silver stain, AFB, legionella and strep antigen, viral panel sent 2/20, Cocci, fungal, flu negative  ID following  Bactrim d/c 2/19, restarted 2/21 with + PJP, continue Linezolid, Cefepime    Found to have PJP pneumonia  Continue IV bactrim and steroids    Pulmonary fibrosis (HCC)  Assessment & Plan  Seen on CT 1/14/2020 and 2/15/202    Pulmonary HTN (HCC)- (present on admission)  Assessment & Plan  Prior echo with RVSP 45  related to chronic lung disease  Dry fluid volume as tolerated    Macrocytic anemia- (present on admission)  Assessment & Plan  Serial monitor hg restrictive transfusion hg < 7    COPD (chronic obstructive pulmonary disease) (HCC)- (present on admission)  Assessment & Plan  Background COPD with 2-3L home O2       VTE:  Lovenox  Ulcer: H2 Antagonist  Lines: Whyte Catheter  Ongoing indication addressed    I have performed a physical exam and reviewed and updated ROS and Plan today (2/21/2020). In review of yesterday's note (2/20/2020), there are no changes except as documented above.     Discussed patient condition and risk of morbidity and/or mortality with RN, RT, Pharmacy, Charge nurse / hot rounds, Patient and infectious disease     The patient remains critically ill with extremely tenuous respiratory status and close monitoring while on max HFNC and Bipap.  Critical care time = 81 minutes in directly providing and coordinating critical care and extensive data review.  No time overlap and excludes procedures.

## 2020-02-21 NOTE — ASSESSMENT & PLAN NOTE
Continue IV Bactrim monitor volume (2l per day) and diuretics to keep even and renal toxicity  Steroids for severe PCP pna  Prednisone 40mg BID x5 days  Prednisone 40mg QD x5 days  Prednisone 20mg QD x11 days  Or IV solumedrol equivalent since her stomach is upset continue pepcid tums for steroid induced gastritis

## 2020-02-21 NOTE — PROGRESS NOTES
Unable to program IV pump for Septra IVPB. Ordered rate exceeds max rate. Rate of 333.3ml/hr verified with Ruddy Reilly. Pump programmed with 2 RN check.

## 2020-02-21 NOTE — PROGRESS NOTES
Infectious Disease Progress Note    Author: Lorri Worthy M.D. Date & Time of service: 2020  8:47 AM    Chief Complaint:  Follow-up for acute on chronic hypoxic respiratory failure, pulmonary infiltrates    Interval History:   afebrile WBC 20.9 patient intubated yesterday secondary to worsening respiratory distress.  Multiple cultures obtained and are currently pending.  Patient is awake on the vent and communicating by writing.  She is breathing comfortably with plans for possible extubation today.   afebrile WBC 20.4 patient extubated yesterday.  BAL cultures negative to date.  Beta D glucan positive.  Currently on BiPAP.  Reports minimal cough   afebrile, WBC 18.1 BAL path- no malignancy.  GMS stain negative for fungal organisms.  Patient feels slightly better today.  She however desats off of BiPAP   afebrile WBC 16 patient now on high flow nasal cannula and breathing better.  Multiple PCP PCR is negative from bronchoscopy specimen.  We will resume Bactrim today.  Respiratory viral panel negative    Labs Reviewed, Medications Reviewed and Radiology Reviewed.    Review of Systems:  Review of Systems   Constitutional: Negative for chills and fever.   Respiratory: Positive for cough and shortness of breath.         Improved   Gastrointestinal: Negative for abdominal pain, diarrhea, nausea and vomiting.   Neurological: Negative for dizziness and headaches.   All other systems reviewed and are negative.       Hemodynamics:  Temp (24hrs), Av.1 °C (98.7 °F), Min:36.8 °C (98.2 °F), Max:37.3 °C (99.1 °F)  Temperature: 36.8 °C (98.2 °F), Monitored Temp: 36.9 °C (98.4 °F)  Pulse  Av.2  Min: 58  Max: 132   Blood Pressure: 128/73       Physical Exam:  Physical Exam  Constitutional:       Appearance: Normal appearance.   HENT:      Nose:      Comments: HFNC     Mouth/Throat:      Mouth: Mucous membranes are moist.   Eyes:      Extraocular Movements: Extraocular movements intact.       Conjunctiva/sclera: Conjunctivae normal.      Pupils: Pupils are equal, round, and reactive to light.   Neck:      Musculoskeletal: Normal range of motion and neck supple.   Cardiovascular:      Rate and Rhythm: Normal rate.      Comments: Right upper chest port in place-nontender, no surrounding erythema  Pulmonary:      Effort: Pulmonary effort is normal.      Breath sounds: Rales present.   Abdominal:      Palpations: Abdomen is soft.      Tenderness: There is no abdominal tenderness.   Musculoskeletal: Normal range of motion.   Skin:     General: Skin is warm and dry.   Neurological:      General: No focal deficit present.      Mental Status: She is alert and oriented to person, place, and time.      Cranial Nerves: No cranial nerve deficit.   Psychiatric:         Mood and Affect: Mood normal.         Behavior: Behavior normal.      Comments: Very pleasant         Meds:    Current Facility-Administered Medications:   •  famotidine  •  calcium carbonate  •  vitamin D  •  promethazine  •  Notify provider if pain remains uncontrolled **AND** Use the numeric rating scale (NRS-11) on regular floors and Critical-Care Pain Observation Tool (CPOT) on ICUs/Trauma to assess pain **AND** Pulse Ox (Oximetry) **AND** Pharmacy Consult Request **AND** If patient difficult to arouse and/or has respiratory depression, stop any opiates that are currently infusing and call a Rapid Response. **AND** oxyCODONE immediate-release **AND** oxyCODONE immediate-release **AND** morphine injection  •  ondansetron  •  senna-docusate **AND** polyethylene glycol/lytes **AND** magnesium hydroxide **AND** bisacodyl  •  linezolid  •  hydrALAZINE  •  guaiFENesin dextromethorphan  •  acetaminophen  •  ascorbic acid  •  MD Alert...Adult ICU Electrolyte Replacement per Pharmacy  •  lidocaine  •  labetalol  •  hydrALAZINE  •  cefepime  •  enoxaparin  •  ondansetron  •  promethazine  •  prochlorperazine  •  Respiratory Therapy Consult  •   ipratropium-albuterol  •  ipratropium-albuterol    Labs:  Recent Labs     02/19/20  0340 02/20/20  0421 02/21/20  0630   WBC 20.4* 18.1* 16.0*   RBC 3.06* 3.25* 3.16*   HEMOGLOBIN 9.5* 10.1* 9.7*   HEMATOCRIT 29.2* 30.6* 29.9*   MCV 95.4 94.2 94.6   MCH 31.0 31.1 30.7   RDW 56.3* 55.2* 54.7*   PLATELETCT 189 219 190   MPV 9.8 9.5 9.3     Recent Labs     02/19/20  0340 02/20/20  0421 02/21/20  0630   SODIUM 140 138 139   POTASSIUM 4.4 4.2 4.5   CHLORIDE 100 98 102   CO2 29 31 31   GLUCOSE 134* 120* 84   BUN 21 27* 29*     Recent Labs     02/19/20  0340 02/20/20  0421 02/21/20  0630   ALBUMIN 3.1* 3.3 3.1*   CREATININE 0.61 0.60 0.48*       Imaging:  Dx-chest-portable (1 View)    Result Date: 2/18/2020 2/18/2020 3:10 AM HISTORY/REASON FOR EXAM: For indication of respiratory failure; For indication of respiratory failure TECHNIQUE/EXAM DESCRIPTION:  Single AP view of the chest. COMPARISON: Yesterday FINDINGS: Dobbhoff tube has been placed in the interim, terminating below the lower margin of the film within the abdomen.  Otherwise medical device position is stable. The cardiac silhouette appears within normal limits. The mediastinal contour appears within normal limits.  The central  pulmonary vasculature appears prominent and indistinct. The lungs appear well expanded bilaterally.  Hazy interstitial bilateral pulmonary opacities are seen. No significant pleural effusions are identified. The bony structures appear age-appropriate.     1.  Pulmonary edema and/or infiltrates are identified, which are stable since the prior exam.    Dx-chest-portable (1 View)    Result Date: 2/17/2020 2/17/2020 6:02 PM HISTORY/REASON FOR EXAM:  POST INTUBATION. TECHNIQUE/EXAM DESCRIPTION AND NUMBER OF VIEWS: Single portable view of the chest. COMPARISON: 2/15/2020 FINDINGS: Endotracheal tube projects at the level of the clavicular heads. Right chest port projects over the SVC. The cardiomediastinal silhouette is stable. Interstitial  and alveolar airspace opacities are again noted and increased on the right compared to prior. No definite pleural effusion or pneumothorax is identified.     Extensive interstitial and alveolar airspace opacities are increased on the right compared to prior. Findings may represent edema or multifocal pneumonia. Underlying mass is not excluded. Underlying emphysematous changes.    Dx-chest-portable (1 View)    Result Date: 2/15/2020  2/15/2020 11:50 AM HISTORY/REASON FOR EXAM: possible sepsis.. Shortness of breath. Lung carcinoma. TECHNIQUE/EXAM DESCRIPTION AND NUMBER OF VIEWS: Single AP view of the chest. COMPARISON: 11/15/2019 FINDINGS: Hardware: Right IJ portacatheter tip overlies the brachiocephalic confluence Lungs: Significant interval worsening consolidated opacity in the left lung with some volume loss noted. There is also worsening diffuse, bilateral reticular opacification Pleura:  Possible layering left pleural fluid Heart and mediastinum: Left heart border is no longer visualized, completely effaced. No gross cardiac silhouette enlargement     Significant interval worsening left perihilar/lingular consolidation with possible underlying Mass. Significant worsening diffuse reticular abnormal opacity is consistent with underlying interstitial lung disease. Superimposed atypical infection or edema is possible New left lung volume loss    Ir-cvc Port Placement > Age 5    Result Date: 1/30/2020 1/30/2020 9:34 AM HISTORY/REASON FOR EXAM:  Patient requires central venous access for chemotherapy. TECHNIQUE/EXAM DESCRIPTION: Following informed consent patient was prepped and draped in the usual fashion.  A total of 14 cc of 1% lidocaine with epinephrine was utilized for local and subcutaneous anesthesia. SEDATION: 4 mg of Versed and 100 mcg of fentanyl were utilized for IV conscious sedation. The procedure was monitored continuously by the sedation nurse. 30 minutes of sedation time were utilized for the  procedure. Fluoroscopy images: 3 fluoroscopic images obtained. Fluoroscopy time: 0.6 minutes The procedure was performed with MAXIMUM STERILE BARRIER TECHNIQUE including sterile gown, mask, cap, and done in a sterile gloves following appropriate hand hygiene and/or sterile scrub. The patient's skin site was prepped with 2% chlorhexidine solution. Ultrasound evaluation of the right internal jugular vein demonstrated patency and an image was archived in the PACS system. Utilizing ultrasonic and fluoroscopic guidance right internal jugular vein was accessed and an 8-Mauritian sheath dilator was positioned in the right internal jugular vein.  Next utilizing a combination of blunt and sharp dissection the port pocket was created in the right infraclavicular fossa.  The port was subsequently attached to the catheter and utilizing a tunneling device a subcutaneous tract was created to the neck puncture site.  The catheter was pulled through the tunnel and trimmed to length.  Next the catheter was advanced through the peel-away sheath which was subsequently removed. The port pocket was closed with 4 deep 2-0 Vicryl sutures, the skin was closed Dermabond.  The  neck puncture site was closed with Dermabond and fluoroscopic images were obtained.  The patient tolerated procedure well. COMPARISON:  None FINDINGS: Fluoroscopic images revealed good positioning of the port in the right infraclavicular fossa.  There is a smooth arc of the catheter into the right internal jugular vein.  The distal tip of the catheter is at the caval atrial junction.     Successful percutaneous placement of Port-A-Cath via right internal jugular approach utilizing ultrasonic and fluoroscopic guidance.    Ct-cta Chest Pulmonary Artery W/ Recons    Result Date: 2/15/2020  2/15/2020 10:22 PM HISTORY/REASON FOR EXAM:  Shortness of breath TECHNIQUE/EXAM DESCRIPTION: CT angiogram scan for pulmonary embolism with contrast, with reconstructions. 1.25 mm helical  sections were obtained from the lung apices through the lung bases following the rapid bolus administration of 55 mL of Omnipaque 350 nonionic contrast. Thin-section overlapping reconstruction interval was utilized. Coronal reconstructions were generated from the axial data. MIP post processing was performed and utilized for the interpretation. Low dose optimization technique was utilized for this CT exam including automated exposure control and adjustment of the mA and/or kV according to patient size. COMPARISON: 1/14/2020 FINDINGS: Pulmonary Embolism: No. Main Pulmonary Arteries: No. Segmental branches: No. Subsegmental branches: No. Additional Comments: None. Lungs: There are increased extensive multifocal airspace groundglass and interstitial opacities. There is underlying emphysema and interstitial lung disease. There is a redemonstrated stellate area of masslike scarring in the left lower lobe measuring 2.6 x 3.1 cm, essentially unchanged from previous exam. Pleura: No pleural effusion. Nodes: No enlarged lymph nodes. Additional findings: There is a small to moderate sized hiatal hernia. There is diffuse low-attenuation of the hepatic parenchyma consistent with steatosis.     1.  No evidence of pulmonary embolism. 2.  Increased extensive multifocal airspace groundglass and interstitial opacities. Findings could be consistent with edema and/or infection. 3.  Underlying emphysema and interstitial lung disease. 4.  Redemonstrated stellate area of masslike scarring in the left lower lobe measuring 2.6 x 3.1 cm, essentially unchanged from the previous exam and consistent with known malignancy. 5.  Small to moderate sized hiatal hernia. 6.  Hepatic steatosis.     Dx-abdomen For Tube Placement    Result Date: 2/17/2020 2/17/2020 8:42 PM HISTORY/REASON FOR EXAM: cortrak placement TECHNIQUE/EXAM DESCRIPTION:  Single AP view the abdomen. COMPARISON:  None FINDINGS: Hazy bilateral lower lobe opacities are seen.  "Dobbhoff tube is seen, the tip lies to the right of the lumbar spine.  The bowel gas pattern appears nonspecific. The bony structures appear age-appropriate.     1.  Nonspecific bowel gas pattern. 2.  Dobbhoff tube tip terminates overlying the expected location of the gastric antrum. 3.  Hazy bilateral lung base infiltrates or edema.      Micro:  Results     Procedure Component Value Units Date/Time    BLOOD CULTURE [[id number]] Collected:  02/15/20 1155    Order Status:  Completed Specimen:  Blood from Peripheral Updated:  02/20/20 1500     Significant Indicator NEG     Source BLD     Site PERIPHERAL     Culture Result No growth after 5 days of incubation.    Narrative:       Per Hospital Policy: Only change Specimen Src: to \"Line\" if  specified by physician order.  Right Wrist    BLOOD CULTURE [[id number]] Collected:  02/15/20 1245    Order Status:  Completed Specimen:  Blood from Peripheral Updated:  02/20/20 1500     Significant Indicator NEG     Source BLD     Site PERIPHERAL     Culture Result No growth after 5 days of incubation.    Narrative:       Per Hospital Policy: Only change Specimen Src: to \"Line\" if  specified by physician order.  Right AC    CULTURE RESPIRATORY W/ GRM STN [[id number]] Collected:  02/17/20 1735    Order Status:  Completed Specimen:  Respirate from Sputum Updated:  02/19/20 0927     Significant Indicator NEG     Source RESP     Site Bronchoalveolar Lavage RML     Culture Result No growth at 48 hours.     Gram Stain Result Rare WBCs.  No organisms seen.      Narrative:       Collected By:[id number] CHUCK BLUE.  Which Lobe (Bronch Only):->RML  Collected By:[id number] CHUCK FERNANDEZ    AFB Culture - BAL [[id number]] Collected:  02/17/20 1750    Order Status:  Completed Specimen:  Respirate from Sputum Updated:  02/19/20 0927     Significant Indicator NEG     Source RESP     Site Quantitative BAL LLL     Culture Result Culture in progress.     AFB Smear Results No acid fast bacilli seen.    " Narrative:       Collected By:[id number] CHUCK FERNANDEZ  Which Lobe (Bronch Only):->LLL  Collected By:[id number] CHUCK FERNANDEZ    Fungal Culture - BAL [[id number]] Collected:  02/17/20 1750    Order Status:  Completed Specimen:  Respirate from Sputum Updated:  02/19/20 0927     Significant Indicator NEG     Source RESP     Site Quantitative BAL LLL     Culture Result No fungal growth to date.    Narrative:       Collected By:[id number] CHUCK BLUE.  Which Lobe (Bronch Only):->LLL  Collected By:[id number] CHUCK FERNANDEZ    Fungal Smear - BAL [[id number]] Collected:  02/17/20 1750    Order Status:  Completed Specimen:  Respirate from Sputum Updated:  02/19/20 0927     Significant Indicator NEG     Source RESP     Site Quantitative BAL LLL     Fungal Smear Results No fungal elements seen.    Narrative:       Collected By:[id number] CHUCK FERNANDEZ  Which Lobe (Bronch Only):->LLL  Collected By:[id number] CHUCK FERNANDEZ    QUANT BRONCHIAL WASHING [[id number]] Collected:  02/17/20 1750    Order Status:  Completed Specimen:  Respirate Updated:  02/19/20 0927     Significant Indicator NEG     Source RESP     Site Quantitative BAL LLL     Culture Result No growth at 48 hours.     Gram Stain Result Rare WBCs.  No organisms seen.      Narrative:       Collected By:[id number] CHUCK FERNANDEZ  Which Lobe (Bronch Only):->LLL  Collected By:[id number] CHUCK FERNANDEZ    Acid Fast Stain [[id number]] Collected:  02/17/20 1750    Order Status:  Completed Specimen:  Respirate Updated:  02/18/20 1622     Significant Indicator NEG     Source RESP     Site Quantitative BAL LLL     AFB Smear Results No acid fast bacilli seen.    Narrative:       Collected By:[id number] CHUCK BLUE.  Which Lobe (Bronch Only):->LLL  Collected By:[id number] CHUCK BLUE.    GRAM STAIN [[id number]] Collected:  02/17/20 1750    Order Status:  Completed Specimen:  Respirate Updated:  02/18/20 1622     Significant Indicator .     Source RESP     Site Quantitative BAL LLL     Gram  Stain Result Rare WBCs.  No organisms seen.      Narrative:       Collected By:[id number] CHUKC BLUE.  Which Lobe (Bronch Only):->LLL  Collected By:[id number] CHUCK FERNANDEZ    GRAM STAIN [[id number]] Collected:  02/17/20 1735    Order Status:  Completed Specimen:  Respirate Updated:  02/18/20 1320     Significant Indicator .     Source RESP     Site Bronchoalveolar Lavage RML     Gram Stain Result Rare WBCs.  No organisms seen.      Narrative:       Collected By:[id number] CHUCK FERNANDEZ  Which Lobe (Bronch Only):->RML  Collected By:[id number] CHUCK FERNANDEZ    URINE CULTURE(NEW) [[id number]] Collected:  02/16/20 0425    Order Status:  Completed Specimen:  Urine Updated:  02/18/20 0728     Significant Indicator NEG     Source UR     Site -     Culture Result Mixed skin anitha <10,000 cfu/mL    Narrative:       Indication for culture:->Septic Shock: Persistent  hypotension,  Lactic acid > 4, vasopressors/inotropes started  Indication for culture:->Septic Shock: Persistent    Pneumocystis DFA [[id number]] Collected:  02/17/20 1821    Order Status:  Canceled Specimen:  Other from Bronchial Washings     CULTURE RESPIRATORY W/ GRM STN [[id number]] Collected:  02/17/20 1750    Order Status:  Canceled Specimen:  Other from Sputum     Quant Bronchial Washing [[id number]] Collected:  02/17/20 1750    Order Status:  Canceled Specimen:  Sputum     Culture Respiratory W/ Grm Stn - BAL [[id number]] Collected:  02/17/20 1750    Order Status:  Canceled Specimen:  Sputum     CULTURE RESPIRATORY W/ GRM STN [[id number]] Collected:  02/17/20 1750    Order Status:  Canceled Specimen:  Sputum     MRSA By PCR (Amp) [[id number]] Collected:  02/16/20 1315    Order Status:  Completed Specimen:  Respirate from Nares Updated:  02/16/20 1901     Significant Indicator NEG     Source RESP     Site NARES     MRSA PCR Negative for MRSA by PCR.    Narrative:       Collected By:[id number] BERTRAM JACOME  Per [facility] Protocol  Collected By:[id number] BERTRAM FORD  L.    URINALYSIS [[id number]] Collected:  02/16/20 0425    Order Status:  Completed Specimen:  Urine Updated:  02/16/20 0517     Color Yellow     Character Clear     Specific Gravity 1.020     Ph 6.0     Glucose Negative mg/dL      Ketones Negative mg/dL      Protein Negative mg/dL      Bilirubin Negative     Urobilinogen, Urine 0.2     Nitrite Negative     Leukocyte Esterase Negative     Occult Blood Negative     Micro Urine Req see below     Comment: Microscopic examination not performed when specimen is clear  and chemically negative for protein, blood, leukocyte esterase  and nitrite.         Narrative:       Indication for culture:->Septic Shock: Persistent  hypotension,  Lactic acid > 4, vasopressors/inotropes started    Cryptococcal Antigen [[id number]] Collected:  02/16/20 0000    Order Status:  Canceled Specimen:  Blood     Influenza A/B By PCR (Adult - Flu Only) [[id number]] Collected:  02/15/20 1253    Order Status:  Completed Specimen:  Respirate from Nasopharyngeal Updated:  02/15/20 1337     Influenza virus A RNA Negative     Influenza virus B, PCR Negative    Narrative:       Indication for culture:->Septic Shock: Persistent  hypotension,  Lactic acid > 4, vasopressors/inotropes started    Culture Respiratory W/ GRM STN [[id number]] Collected:  02/15/20 0000    Order Status:  Canceled Specimen:  Sputum           Assessment:  Active Hospital Problems    Diagnosis   • *Acute respiratory failure with hypoxia (HCC) [J96.01]   • Lung cancer, primary, with metastasis from lung to other site (HCC) [C34.90]   • COPD (chronic obstructive pulmonary disease) (HCC) [J44.9]   • Pulmonary fibrosis (HCC) [J84.10]   • Pneumonia [J18.9]       Plan:  Acute on chronic hypoxic respiratory failure, improved overall  2/2 below  Intubated on 2/17  Extubated on 2/18  Now on HFNC  Status post bronchoscopy with BAL on 2/17.  Patient was noted to have friable mucosa bilateral, bloody mucoid secretions.    Pneumonia/pulmonary  opacities noted on imaging  Pneumocystis pneumonia  Patient was on high-dose steroids prior to admission so she is certainly at risk for opportunistic infections  PCP PCR from BAL - positive  Status post BAL on 2/17.  BAL cultures-negative to date  Serum galactomannan - pending  Cocci serology- pending  Fungitell - positive  Histo - negative  Start bactrim as multiple PCP PCR from bronch specimen positive  Plan for 3 week course total followed by PCP prophylaxis if patient remains on high dose steroids post treatment  On steroids  DC oral linezolid, and IV cefepime today  Plan 3 week course of bactrim  Respiratory viral panel - negative    Leukocytosis, persistent. Improving overall  Multifactorial (infection, high-dose steroids- currently on IV Solu-Medrol)  On antibiotics above  Slowly improving  Monitor    Metastatic squamous cell lung cancer   Status post chemoradiation  Previously on immunotherapy    Pulmonary fibrosis  Patient was seen by pulmonologist, Dr. Longo prior to admission  Patient was on high-dose steroid taper prior to admission, started by oncology  On Solu-Medrol    COPD/chronic hypoxia  On 3LNC baseline    I have performed a physical exam and reviewed and updated ROS and plan today 2/21/2020.  In review of yesterday's note 2/20/2020, there are no changes except as documented above.    Discussed with intensivist, Dr. Tovar and CHANELL Garza pharmacist

## 2020-02-21 NOTE — CARE PLAN
Problem: Oxygenation:  Goal: Maintain adequate oxygenation dependent on patient condition  Outcome: PROGRESSING AS EXPECTED     Problem: Bronchoconstriction:  Goal: Improve in air movement and diminished wheezing  Outcome: PROGRESSING AS EXPECTED     Problem: Ventilation Defect:  Goal: Ability to achieve and maintain unassisted ventilation or tolerate decreased levels of ventilator support  Outcome: PROGRESSING AS EXPECTED       Respiratory Update    Treatment modality: SVN  Frequency: Q4   Bipap 14/10 100%   High Flow while awake as tolerated     Pt tolerating current treatments well with no adverse reactions.

## 2020-02-22 NOTE — CARE PLAN
Problem: Bowel/Gastric:  Goal: Normal bowel function is maintained or improved  Outcome: PROGRESSING AS EXPECTED     Problem: Skin Integrity  Goal: Risk for impaired skin integrity will decrease  Outcome: PROGRESSING AS EXPECTED     Problem: Infection  Goal: Will remain free from infection  Outcome: PROGRESSING SLOWER THAN EXPECTED     Problem: Respiratory:  Goal: Respiratory status will improve  Outcome: PROGRESSING SLOWER THAN EXPECTED  Note: Pt desats with minimal activity     Problem: Mobility  Goal: Risk for activity intolerance will decrease  Outcome: PROGRESSING SLOWER THAN EXPECTED

## 2020-02-22 NOTE — CARE PLAN
Problem: Oxygenation:  Goal: Maintain adequate oxygenation dependent on patient condition  Outcome: PROGRESSING AS EXPECTED     Problem: Bronchoconstriction:  Goal: Improve in air movement and diminished wheezing  Outcome: PROGRESSING AS EXPECTED   Adult Noninvasive Ventilation    IPAP (cmH2O): 14 (02/22/20 0154)  EPAP (cm H2O): 10 (02/22/20 0154)  FiO2: 75 (02/22/20 0154)           Respiratory Update    Treatment modality: Aerogen   Frequency: Q4     Pt tolerating current treatments well with no adverse reactions.

## 2020-02-22 NOTE — PROGRESS NOTES
Critical Care Progress Note    Date of admission  2/15/2020    Chief Complaint  65 yo female, former smoker 35pack year quit in 2012 with hx of squamous cell lung CA (dx in 9/2019, in [facility] mets to lymph nodes s/p chemoXRT (last 2 months ago), recently received immunotherapy (durvalumab IV) 3 weeks prior to admission), COPD (on 3L home O2), presented on 2/15 for worsening SOB x2-3 days. Pt was seen by pulmonary Dr. 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 Immunotherapy was held. She was started on high dose prednisone 80mg then taper in the past 3 weeks by Dr Callahan.      At admission, influenza A/B PCR is negative. pt was on oxymask 5L, sat >90% and progressively increasing to 8L oxymask. Afebrile, SBP in 100-110s. WBC 17K -12K. Creatinine 0.5, HCO3 26. Lactate 1.5. Procalcitonin 0.12. CT chest today showed increased extensive multifocal airspace ground glass and interstitial opacities. No PE. Started on solumedrol 125 Q6H. Pt initially started on unasyn, then changed to linezolid and cefepime. Pt was transferred to ICU for further evaluation Taken from Dr Christopher kan.     Hospital Course  Intubated [facility] 2/17 pulse dose steroids started   Extubated 2/18 to bipap and HFNC  2/19 still needing significant bipap PCP neg bactrim stopped  2/20 changed code status, still needing bipap and HFNC mild/minimal improvement in CXR  2/21 PJP +, restart bactrim, prednisone still needing bipap/HFNC with max setting offer if tires we can go on ventilator  2/22 spent most the day on bipap still spirits are high     Interval Problem Update  Reviewed last 24 hour events:  Neuro: aox4 no pain  HR: 80-90  SBP: 130-140  Tmax: afebrile  GI: bm yesterday regular diet  UOP: 2L  Lines: cath   Resp: bipap max, hfnc max daniel q4   Vte: lovenox  PPI/H2:pepcid   Antibx: Bactrim IV     Lasix BID  TPA in port no improvement  Will order IR Port-to-gram     Review of Systems  Review of Systems    Constitutional: Negative for fever and malaise/fatigue.   HENT: Negative for ear discharge and nosebleeds.    Eyes: Negative for double vision and discharge.   Respiratory: Negative for cough, sputum production, shortness of breath and stridor.    Gastrointestinal: Negative for abdominal pain, constipation, heartburn, nausea and vomiting.   Genitourinary: Negative for dysuria and frequency.   Neurological: Negative for tremors, sensory change, speech change, weakness and headaches.   Psychiatric/Behavioral: Negative for depression and hallucinations. The patient is not nervous/anxious and does not have insomnia.         Vital Signs for last 24 hours   Temp:  [37.4 °C (99.3 °F)] 37.4 °C (99.3 °F)  Pulse:  [] 75  Resp:  [16-27] 22  BP: (128-164)/(58-81) 152/81  SpO2:  [89 %-99 %] 97 %    Hemodynamic parameters for last 24 hours       Respiratory Information for the last 24 hours       Physical Exam   Physical Exam  Constitutional:       Appearance: She is not ill-appearing, toxic-appearing or diaphoretic.      Comments: Sitting in bed   HENT:      Mouth/Throat:      Mouth: Mucous membranes are moist.      Comments: Bipap  Eyes:      Pupils: Pupils are equal, round, and reactive to light.   Neck:      Musculoskeletal: No neck rigidity or muscular tenderness.   Cardiovascular:      Rate and Rhythm: Normal rate.      Heart sounds: No murmur.   Pulmonary:      Effort: No respiratory distress.      Breath sounds: No stridor. No wheezing, rhonchi or rales.      Comments: Bilateral crackles increase work of breathing quickly desaturates to 70%  Chest:      Chest wall: No tenderness.   Abdominal:      General: There is no distension.      Palpations: There is no mass.      Tenderness: There is no abdominal tenderness. There is no guarding or rebound.      Hernia: No hernia is present.   Musculoskeletal:         General: No swelling or tenderness.      Comments: Clubbing bilateral   Neurological:      Mental Status:  She is alert and oriented to person, place, and time.      Cranial Nerves: No cranial nerve deficit.      Sensory: No sensory deficit.      Motor: No weakness.      Coordination: Coordination normal.      Gait: Gait normal.      Comments: Answers quickly moves all extremities   Psychiatric:         Mood and Affect: Mood normal.         Medications  Current Facility-Administered Medications   Medication Dose Route Frequency Provider Last Rate Last Dose   • furosemide (LASIX) injection 20 mg  20 mg Intravenous BID DIURETIC Kenton Tovar M.D.   20 mg at 02/22/20 1541   • sulfamethoxazole-trimethoprim (SEPTRA) 336 mg of trimethoprim in D5W 500 mL IVPB  15 mg/kg/day of trimethoprim Intravenous Q6HRS Kenton Tovar M.D.   Stopped at 02/22/20 1711   • methylPREDNISolone (SOLU-MEDROL) 40 MG injection 40 mg  40 mg Intravenous Q12HRS Kenton Tovar M.D.   40 mg at 02/22/20 1751    Followed by   • [START ON 2/28/2020] predniSONE (DELTASONE) tablet 40 mg  40 mg Oral DAILY Kenton Tovar M.D.        Followed by   • [START ON 3/6/2020] predniSONE (DELTASONE) tablet 20 mg  20 mg Oral DAILY Kenton Tovar M.D.       • famotidine (PEPCID) tablet 20 mg  20 mg Oral BID Kenton Tovar M.D.   20 mg at 02/22/20 0521   • calcium carbonate (TUMS) chewable tab 500 mg  500 mg Oral DAILY Kenton Tovar M.D.   500 mg at 02/22/20 0521   • vitamin D (cholecalciferol) tablet 2,000 Units  2,000 Units Oral DAILY Kenton Tovar M.D.   2,000 Units at 02/22/20 0521   • promethazine (PHENERGAN) tablet 12.5-25 mg  12.5-25 mg Oral Q4HRS PRN Kenton Tovar M.D.       • oxyCODONE immediate release (ROXICODONE) tablet 10 mg  10 mg Oral Q3HRS PRN Kenton Tovar M.D.        And   • Pharmacy Consult Request ...Pain Management Review 1 Each  1 Each Other PHARMACY TO DOSE Kenton Tovar M.D.        And   • oxyCODONE immediate-release (ROXICODONE) tablet 5 mg  5 mg Oral Q3HRS PRN Kenton Tovar M.D.   5 mg at 02/21/20 1159     And   • morphine (pf) 4 MG/ML injection 4 mg  4 mg Intravenous Q3HRS PRN Kenton Tovar M.D.       • ondansetron (ZOFRAN ODT) dispertab 4 mg  4 mg Oral Q4HRS PRN Kenton Tovar M.D.   Stopped at 02/20/20 0924   • magnesium hydroxide (MILK OF MAGNESIA) suspension 30 mL  30 mL Oral QDAY PRN Kenton Tovar M.D.        And   • senna-docusate (PERICOLACE or SENOKOT S) 8.6-50 MG per tablet 2 Tab  2 Tab Oral BID Kenton Tovar M.D.   2 Tab at 02/21/20 0646    And   • polyethylene glycol/lytes (MIRALAX) PACKET 1 Packet  1 Packet Oral QDAY PRN Kenton Tovar M.D.   1 Packet at 02/20/20 1725    And   • bisacodyl (DULCOLAX) suppository 10 mg  10 mg Rectal QDAY PRN Kenton Tovar M.D.   10 mg at 02/21/20 1056   • hydrALAZINE (APRESOLINE) tablet 10 mg  10 mg Oral Q8HRS PRN Kenton Tovar M.D.       • guaiFENesin dextromethorphan (ROBITUSSIN DM) 100-10 MG/5ML syrup 10 mL  10 mL Oral Q6HRS PRN Kenton Tovar M.D.       • acetaminophen (TYLENOL) tablet 650 mg  650 mg Oral Q6HRS PRN Kenton Tovar M.D.       • ascorbic acid tablet 1,000 mg  1,000 mg Oral DAILY Kenton Tovar M.D.   1,000 mg at 02/22/20 0521   • MD Alert...ICU Electrolyte Replacement per Pharmacy   Other PHARMACY TO DOSE Kenton Tovar M.D.       • lidocaine (XYLOCAINE) 1 % injection 1-2 mL  1-2 mL Tracheal Tube Q30 MIN PRN Kenton Tovar M.D.       • labetalol (NORMODYNE/TRANDATE) injection 10 mg  10 mg Intravenous Q4HRS PRN Kenton Tovar M.D.   10 mg at 02/17/20 1805   • hydrALAZINE (APRESOLINE) injection 20 mg  20 mg Intravenous Q4HRS PRN Lazaro Benites M.D.   20 mg at 02/17/20 2023   • enoxaparin (LOVENOX) inj 40 mg  40 mg Subcutaneous DAILY Dino Sykes M.D.   40 mg at 02/22/20 0521   • ondansetron (ZOFRAN) syringe/vial injection 4 mg  4 mg Intravenous Q4HRS PRN Dino Sykes M.D.   4 mg at 02/20/20 1004   • promethazine (PHENERGAN) suppository 12.5-25 mg  12.5-25 mg Rectal Q4HRS PRN Dino Sykes M.D.        • prochlorperazine (COMPAZINE) injection 5-10 mg  5-10 mg Intravenous Q4HRS PRN Dino Sykes M.D.       • Respiratory Therapy Consult   Nebulization Continuous RT Dino Sykes M.D.       • ipratropium-albuterol (DUONEB) nebulizer solution  3 mL Nebulization Q4HRS (RT) Dino Sykes M.D.   3 mL at 02/22/20 1615   • ipratropium-albuterol (DUONEB) nebulizer solution  3 mL Nebulization Q2HRS PRN (RT) Dino Sykes M.D.           Fluids    Intake/Output Summary (Last 24 hours) at 2/22/2020 1813  Last data filed at 2/22/2020 1711  Gross per 24 hour   Intake 2870 ml   Output 3000 ml   Net -130 ml       Laboratory          Recent Labs     02/20/20 0421 02/21/20  0630 02/22/20  0500   SODIUM 138 139 134*   POTASSIUM 4.2 4.5 3.9   CHLORIDE 98 102 95*   CO2 31 31 30   BUN 27* 29* 19   CREATININE 0.60 0.48* 0.57   MAGNESIUM 2.3 2.2 2.1   PHOSPHORUS 3.9 3.5 3.1   CALCIUM 8.8 8.5 8.7     Recent Labs     02/20/20 0421 02/21/20  0630 02/22/20  0500   GLUCOSE 120* 84 151*     Recent Labs     02/20/20  0421 02/21/20  0630 02/22/20  0500   WBC 18.1* 16.0* 16.7*     Recent Labs     02/20/20 0421 02/21/20  0630 02/22/20  0500 02/22/20  1545   RBC 3.25* 3.16* 3.50*  --    HEMOGLOBIN 10.1* 9.7* 10.8*  --    HEMATOCRIT 30.6* 29.9* 32.6*  --    PLATELETCT 219 190 192  --    PROTHROMBTM  --   --   --  13.7   APTT  --   --   --  25.1   INR  --   --   --  1.03       Imaging  X-Ray:  I have personally reviewed the images and compared with prior images. and My impression is: no significant change  CT:    Reviewed    Assessment/Plan  * Acute respiratory failure with hypoxia (HCC)  Assessment & Plan  Intubated 2/17 for diagnostic workup -> extubated 2/18    Discussed with Dr Cate hernandez 2/17, Dr Longo 2/18  Progression of diffuse airspace opacities with chronic fibrotic changes in the background of emphysema   Found to have PJP pneumonia  Linezolid, Cefepimine d/c 2/21    HFNC and bipap monitor need for intubation  inc Epap  Patient would not want trach or chronic ventilator dependency   Solumedrol 1gr IV x3 days 2/17-2/19 -> changed to PJP prednisone equivalent   Respiratory viral PCR negative  Check fungitell, aspergillus, cryptococcus antigen, cocci Ab, s/p BAL for silver stain, PJP DFA, afb, fungal cx, cd4/8 ratio, cell diff, culture/BAL from [facility] and [facility]  Dry fluid balance as much as tolerate being careful with IV bactrim and renal toxicity  Up to chair and IS as much as possible w/ movement needs to use bipap.       Lung cancer, primary, with metastasis from lung to other site (HCC)- (present on admission)  Assessment & Plan  Hx of squamous cell lung CA (dx in 9/2019, in [facility] mets to lymph nodes   s/p chemoXRT (last 2 months ago)  recently received immunotherapy (durvalumab IV) 3 weeks prior to admission      Pneumocystis jiroveci pneumonia (HCC)  Assessment & Plan  Continue IV Bactrim monitor volume (2l per day) and diuretics to keep even and renal toxicity  Steroids for severe PCP pna  Prednisone 40mg BID x5 days  Prednisone 40mg QD x5 days  Prednisone 20mg QD x11 days  Or IV solumedrol equivalent since her stomach is upset continue pepcid tums for steroid induced gastritis    Leukocytosis  Assessment & Plan  Serial monitor steroid vs infectious       Pneumonia  Assessment & Plan  Currently treating broadly  Follow on up BAL [facility], [facility], PJP DFA, silver stain, AFB, legionella and strep antigen, viral panel sent 2/20, Cocci, fungal, flu negative  ID following  Bactrim d/c 2/19, restarted 2/21 with + PJP, continue Linezolid, Cefepime    Found to have PJP pneumonia  Continue IV bactrim and steroids    Pulmonary fibrosis (HCC)  Assessment & Plan  Seen on CT 1/14/2020 and 2/15/202    Pulmonary HTN (HCC)- (present on admission)  Assessment & Plan  Prior echo with RVSP 45 related to chronic lung disease  Dry fluid volume as tolerated    Macrocytic anemia- (present on admission)  Assessment & Plan  Serial monitor hg restrictive  transfusion hg < 7    COPD (chronic obstructive pulmonary disease) (HCC)- (present on admission)  Assessment & Plan  Background COPD with 2-3L home O2       VTE:  Lovenox  Ulcer: H2 Antagonist  Lines: Whyte Catheter  Ongoing indication addressed    I have performed a physical exam and reviewed and updated ROS and Plan today (2/22/2020). In review of yesterday's note (2/21/2020), there are no changes except as documented above.     Discussed patient condition and risk of morbidity and/or mortality with RN, RT, Pharmacy, Charge nurse / hot rounds, Patient and infectious disease     The patient remains critically ill needing continuous bipap and HFNC and serial monitoring.  Critical care time = 45 minutes in directly providing and coordinating critical care and extensive data review.  No time overlap and excludes procedures.

## 2020-02-22 NOTE — PROGRESS NOTES
Monitor summary:  SR 70-80  .14/.08/.44    Shift summary:  Pt slept long intervals, continues to desat with minimal movement.  Port de-accessed for routine change.  Unable to get blood return with new .75in needle on 2 separate attempts.  Phoned [facility] charge RN for assistance.  Awaiting access

## 2020-02-22 NOTE — PROGRESS NOTES
Infectious Disease Progress Note    Author: Lorri Worthy M.D. Date & Time of service: 2020  11:40 AM    Chief Complaint:  Follow-up for acute on chronic hypoxic respiratory failure, pulmonary infiltrates    Interval History:   afebrile WBC 20.9 patient intubated yesterday secondary to worsening respiratory distress.  Multiple cultures obtained and are currently pending.  Patient is awake on the vent and communicating by writing.  She is breathing comfortably with plans for possible extubation today.   afebrile WBC 20.4 patient extubated yesterday.  BAL cultures negative to date.  Beta D glucan positive.  Currently on BiPAP.  Reports minimal cough   afebrile, WBC 18.1 BAL path- no malignancy.  GMS stain negative for fungal organisms.  Patient feels slightly better today.  She however desats off of BiPAP   afebrile WBC 16 patient now on high flow nasal cannula and breathing better.  Multiple PCP PCR is negative from bronchoscopy specimen.  We will resume Bactrim today.  Respiratory viral panel negative   T-max 99.9 WBC 16.7.  Patient is on BiPAP.  She states that her breathing is comfortable and she is not feeling as tired.    Labs Reviewed, Medications Reviewed and Radiology Reviewed.    Review of Systems:  Review of Systems   Constitutional: Negative for chills and fever.   Respiratory: Positive for cough and shortness of breath.         Improved   Gastrointestinal: Negative for abdominal pain, diarrhea, nausea and vomiting.   Neurological: Negative for dizziness and headaches.   All other systems reviewed and are negative.       Hemodynamics:  Temp (24hrs), Av.4 °C (99.3 °F), Min:37.4 °C (99.3 °F), Max:37.4 °C (99.3 °F)  Temperature: 37.4 °C (99.3 °F), Monitored Temp: 37.1 °C (98.8 °F)  Pulse  Av.7  Min: 58  Max: 132   Blood Pressure: 137/67       Physical Exam:  Physical Exam  Constitutional:       Appearance: Normal appearance.   HENT:      Head:      Comments: BiPAP      Mouth/Throat:      Mouth: Mucous membranes are moist.   Eyes:      Extraocular Movements: Extraocular movements intact.      Conjunctiva/sclera: Conjunctivae normal.      Pupils: Pupils are equal, round, and reactive to light.   Neck:      Musculoskeletal: Normal range of motion and neck supple.   Cardiovascular:      Rate and Rhythm: Regular rhythm. Tachycardia present.      Comments: Right upper chest port in place-nontender, no surrounding erythema  Pulmonary:      Effort: Pulmonary effort is normal.      Breath sounds: Rales present.   Abdominal:      Palpations: Abdomen is soft.      Tenderness: There is no abdominal tenderness.   Musculoskeletal: Normal range of motion.   Skin:     General: Skin is warm and dry.   Neurological:      General: No focal deficit present.      Mental Status: She is alert and oriented to person, place, and time.      Cranial Nerves: No cranial nerve deficit.   Psychiatric:         Mood and Affect: Mood normal.         Behavior: Behavior normal.      Comments: Very pleasant         Meds:    Current Facility-Administered Medications:   •  furosemide  •  sulfamethoxazole-trimethoprim (Septra) IV  •  methylPREDNISolone **FOLLOWED BY** [START ON 2/28/2020] predniSONE **FOLLOWED BY** [START ON 3/6/2020] predniSONE  •  famotidine  •  calcium carbonate  •  vitamin D  •  promethazine  •  Notify provider if pain remains uncontrolled **AND** Use the numeric rating scale (NRS-11) on regular floors and Critical-Care Pain Observation Tool (CPOT) on ICUs/Trauma to assess pain **AND** Pulse Ox (Oximetry) **AND** Pharmacy Consult Request **AND** If patient difficult to arouse and/or has respiratory depression, stop any opiates that are currently infusing and call a Rapid Response. **AND** oxyCODONE immediate-release **AND** oxyCODONE immediate-release **AND** morphine injection  •  ondansetron  •  senna-docusate **AND** polyethylene glycol/lytes **AND** magnesium hydroxide **AND** bisacodyl  •   hydrALAZINE  •  guaiFENesin dextromethorphan  •  acetaminophen  •  ascorbic acid  •  MD Alert...Adult ICU Electrolyte Replacement per Pharmacy  •  lidocaine  •  labetalol  •  hydrALAZINE  •  enoxaparin  •  ondansetron  •  promethazine  •  prochlorperazine  •  Respiratory Therapy Consult  •  ipratropium-albuterol  •  ipratropium-albuterol    Labs:  Recent Labs     02/20/20  0421 02/21/20  0630 02/22/20  0500   WBC 18.1* 16.0* 16.7*   RBC 3.25* 3.16* 3.50*   HEMOGLOBIN 10.1* 9.7* 10.8*   HEMATOCRIT 30.6* 29.9* 32.6*   MCV 94.2 94.6 93.1   MCH 31.1 30.7 30.9   RDW 55.2* 54.7* 53.0*   PLATELETCT 219 190 192   MPV 9.5 9.3 9.4     Recent Labs     02/20/20  0421 02/21/20  0630 02/22/20  0500   SODIUM 138 139 134*   POTASSIUM 4.2 4.5 3.9   CHLORIDE 98 102 95*   CO2 31 31 30   GLUCOSE 120* 84 151*   BUN 27* 29* 19     Recent Labs     02/20/20  0421 02/21/20  0630 02/22/20  0500   ALBUMIN 3.3 3.1* 3.6   CREATININE 0.60 0.48* 0.57       Imaging:  Dx-chest-portable (1 View)    Result Date: 2/18/2020 2/18/2020 3:10 AM HISTORY/REASON FOR EXAM: For indication of respiratory failure; For indication of respiratory failure TECHNIQUE/EXAM DESCRIPTION:  Single AP view of the chest. COMPARISON: Yesterday FINDINGS: Dobbhoff tube has been placed in the interim, terminating below the lower margin of the film within the abdomen.  Otherwise medical device position is stable. The cardiac silhouette appears within normal limits. The mediastinal contour appears within normal limits.  The central  pulmonary vasculature appears prominent and indistinct. The lungs appear well expanded bilaterally.  Hazy interstitial bilateral pulmonary opacities are seen. No significant pleural effusions are identified. The bony structures appear age-appropriate.     1.  Pulmonary edema and/or infiltrates are identified, which are stable since the prior exam.    Dx-chest-portable (1 View)    Result Date: 2/17/2020 2/17/2020 6:02 PM HISTORY/REASON FOR EXAM:   POST INTUBATION. TECHNIQUE/EXAM DESCRIPTION AND NUMBER OF VIEWS: Single portable view of the chest. COMPARISON: 2/15/2020 FINDINGS: Endotracheal tube projects at the level of the clavicular heads. Right chest port projects over the SVC. The cardiomediastinal silhouette is stable. Interstitial and alveolar airspace opacities are again noted and increased on the right compared to prior. No definite pleural effusion or pneumothorax is identified.     Extensive interstitial and alveolar airspace opacities are increased on the right compared to prior. Findings may represent edema or multifocal pneumonia. Underlying mass is not excluded. Underlying emphysematous changes.    Dx-chest-portable (1 View)    Result Date: 2/15/2020  2/15/2020 11:50 AM HISTORY/REASON FOR EXAM: possible sepsis.. Shortness of breath. Lung carcinoma. TECHNIQUE/EXAM DESCRIPTION AND NUMBER OF VIEWS: Single AP view of the chest. COMPARISON: 11/15/2019 FINDINGS: Hardware: Right IJ portacatheter tip overlies the brachiocephalic confluence Lungs: Significant interval worsening consolidated opacity in the left lung with some volume loss noted. There is also worsening diffuse, bilateral reticular opacification Pleura:  Possible layering left pleural fluid Heart and mediastinum: Left heart border is no longer visualized, completely effaced. No gross cardiac silhouette enlargement     Significant interval worsening left perihilar/lingular consolidation with possible underlying Mass. Significant worsening diffuse reticular abnormal opacity is consistent with underlying interstitial lung disease. Superimposed atypical infection or edema is possible New left lung volume loss    Ir-cvc Port Placement > Age 5    Result Date: 1/30/2020 1/30/2020 9:34 AM HISTORY/REASON FOR EXAM:  Patient requires central venous access for chemotherapy. TECHNIQUE/EXAM DESCRIPTION: Following informed consent patient was prepped and draped in the usual fashion.  A total of 14 cc of  1% lidocaine with epinephrine was utilized for local and subcutaneous anesthesia. SEDATION: 4 mg of Versed and 100 mcg of fentanyl were utilized for IV conscious sedation. The procedure was monitored continuously by the sedation nurse. 30 minutes of sedation time were utilized for the procedure. Fluoroscopy images: 3 fluoroscopic images obtained. Fluoroscopy time: 0.6 minutes The procedure was performed with MAXIMUM STERILE BARRIER TECHNIQUE including sterile gown, mask, cap, and done in a sterile gloves following appropriate hand hygiene and/or sterile scrub. The patient's skin site was prepped with 2% chlorhexidine solution. Ultrasound evaluation of the right internal jugular vein demonstrated patency and an image was archived in the PACS system. Utilizing ultrasonic and fluoroscopic guidance right internal jugular vein was accessed and an 8-Serbian sheath dilator was positioned in the right internal jugular vein.  Next utilizing a combination of blunt and sharp dissection the port pocket was created in the right infraclavicular fossa.  The port was subsequently attached to the catheter and utilizing a tunneling device a subcutaneous tract was created to the neck puncture site.  The catheter was pulled through the tunnel and trimmed to length.  Next the catheter was advanced through the peel-away sheath which was subsequently removed. The port pocket was closed with 4 deep 2-0 Vicryl sutures, the skin was closed Dermabond.  The  neck puncture site was closed with Dermabond and fluoroscopic images were obtained.  The patient tolerated procedure well. COMPARISON:  None FINDINGS: Fluoroscopic images revealed good positioning of the port in the right infraclavicular fossa.  There is a smooth arc of the catheter into the right internal jugular vein.  The distal tip of the catheter is at the caval atrial junction.     Successful percutaneous placement of Port-A-Cath via right internal jugular approach utilizing  ultrasonic and fluoroscopic guidance.    Ct-cta Chest Pulmonary Artery W/ Recons    Result Date: 2/15/2020  2/15/2020 10:22 PM HISTORY/REASON FOR EXAM:  Shortness of breath TECHNIQUE/EXAM DESCRIPTION: CT angiogram scan for pulmonary embolism with contrast, with reconstructions. 1.25 mm helical sections were obtained from the lung apices through the lung bases following the rapid bolus administration of 55 mL of Omnipaque 350 nonionic contrast. Thin-section overlapping reconstruction interval was utilized. Coronal reconstructions were generated from the axial data. MIP post processing was performed and utilized for the interpretation. Low dose optimization technique was utilized for this CT exam including automated exposure control and adjustment of the mA and/or kV according to patient size. COMPARISON: 1/14/2020 FINDINGS: Pulmonary Embolism: No. Main Pulmonary Arteries: No. Segmental branches: No. Subsegmental branches: No. Additional Comments: None. Lungs: There are increased extensive multifocal airspace groundglass and interstitial opacities. There is underlying emphysema and interstitial lung disease. There is a redemonstrated stellate area of masslike scarring in the left lower lobe measuring 2.6 x 3.1 cm, essentially unchanged from previous exam. Pleura: No pleural effusion. Nodes: No enlarged lymph nodes. Additional findings: There is a small to moderate sized hiatal hernia. There is diffuse low-attenuation of the hepatic parenchyma consistent with steatosis.     1.  No evidence of pulmonary embolism. 2.  Increased extensive multifocal airspace groundglass and interstitial opacities. Findings could be consistent with edema and/or infection. 3.  Underlying emphysema and interstitial lung disease. 4.  Redemonstrated stellate area of masslike scarring in the left lower lobe measuring 2.6 x 3.1 cm, essentially unchanged from the previous exam and consistent with known malignancy. 5.  Small to moderate sized  "hiatal hernia. 6.  Hepatic steatosis.     Dx-abdomen For Tube Placement    Result Date: 2/17/2020 2/17/2020 8:42 PM HISTORY/REASON FOR EXAM: cortrak placement TECHNIQUE/EXAM DESCRIPTION:  Single AP view the abdomen. COMPARISON:  None FINDINGS: Hazy bilateral lower lobe opacities are seen. Dobbhoff tube is seen, the tip lies to the right of the lumbar spine.  The bowel gas pattern appears nonspecific. The bony structures appear age-appropriate.     1.  Nonspecific bowel gas pattern. 2.  Dobbhoff tube tip terminates overlying the expected location of the gastric antrum. 3.  Hazy bilateral lung base infiltrates or edema.      Micro:  Results     Procedure Component Value Units Date/Time    BLOOD CULTURE [[id number]] Collected:  02/15/20 1155    Order Status:  Completed Specimen:  Blood from Peripheral Updated:  02/20/20 1500     Significant Indicator NEG     Source BLD     Site PERIPHERAL     Culture Result No growth after 5 days of incubation.    Narrative:       Per Hospital Policy: Only change Specimen Src: to \"Line\" if  specified by physician order.  Right Wrist    BLOOD CULTURE [[id number]] Collected:  02/15/20 1245    Order Status:  Completed Specimen:  Blood from Peripheral Updated:  02/20/20 1500     Significant Indicator NEG     Source BLD     Site PERIPHERAL     Culture Result No growth after 5 days of incubation.    Narrative:       Per Hospital Policy: Only change Specimen Src: to \"Line\" if  specified by physician order.  Right AC    CULTURE RESPIRATORY W/ GRM STN [[id number]] Collected:  02/17/20 1735    Order Status:  Completed Specimen:  Respirate from Sputum Updated:  02/19/20 0927     Significant Indicator NEG     Source RESP     Site Bronchoalveolar Lavage RML     Culture Result No growth at 48 hours.     Gram Stain Result Rare WBCs.  No organisms seen.      Narrative:       Collected By:[id number] CHUCK BLUE.  Which Lobe (Bronch Only):->RML  Collected By:[id number] CHUCK BLUE.    AFB Culture - BAL " [[id number]] Collected:  02/17/20 1750    Order Status:  Completed Specimen:  Respirate from Sputum Updated:  02/19/20 0927     Significant Indicator NEG     Source RESP     Site Quantitative BAL LLL     Culture Result Culture in progress.     AFB Smear Results No acid fast bacilli seen.    Narrative:       Collected By:Hoda BLUE.  Which Lobe (Bronch Only):->LLL  Collected By:[id number] FERNANDEZ    Fungal Culture - BAL [[id number]] Collected:  02/17/20 1750    Order Status:  Completed Specimen:  Respirate from Sputum Updated:  02/19/20 0927     Significant Indicator NEG     Source RESP     Site Quantitative BAL LLL     Culture Result No fungal growth to date.    Narrative:       Collected By:[id number] BLUE.  Which Lobe (Bronch Only):->LLL  Collected By:[id number] BLUE.    Fungal Smear - BAL [[id number]] Collected:  02/17/20 1750    Order Status:  Completed Specimen:  Respirate from Sputum Updated:  02/19/20 0927     Significant Indicator NEG     Source RESP     Site Quantitative BAL LLL     Fungal Smear Results No fungal elements seen.    Narrative:       Collected By:[id number] BLUE.  Which Lobe (Bronch Only):->LLL  Collected By:[id number] CHUCK BLUE.    QUANT BRONCHIAL WASHING [[id number]] Collected:  02/17/20 1750    Order Status:  Completed Specimen:  Respirate Updated:  02/19/20 0927     Significant Indicator NEG     Source RESP     Site Quantitative BAL LLL     Culture Result No growth at 48 hours.     Gram Stain Result Rare WBCs.  No organisms seen.      Narrative:       Collected By:[id number] BLUE.  Which Lobe (Bronch Only):->LLL  Collected By:[id number] BLUE.    Acid Fast Stain [[id number]] Collected:  02/17/20 1750    Order Status:  Completed Specimen:  Respirate Updated:  02/18/20 1622     Significant Indicator NEG     Source RESP     Site Quantitative BAL LLL     AFB Smear Results No acid fast bacilli seen.    Narrative:       Collected By:Hoda WOODS  LAKEISHA FERNANDEZ  Which Lobe (Bronch Only):->LLL  Collected By:[id number] CHUCK FERNANDEZ    GRAM STAIN [[id number]] Collected:  02/17/20 1750    Order Status:  Completed Specimen:  Respirate Updated:  02/18/20 1622     Significant Indicator .     Source RESP     Site Quantitative BAL LLL     Gram Stain Result Rare WBCs.  No organisms seen.      Narrative:       Collected By:[id number] CHUCK BLUE.  Which Lobe (Bronch Only):->LLL  Collected By:[id number] CHUCK FERNANDEZ    GRAM STAIN [[id number]] Collected:  02/17/20 1735    Order Status:  Completed Specimen:  Respirate Updated:  02/18/20 1320     Significant Indicator .     Source RESP     Site Bronchoalveolar Lavage RML     Gram Stain Result Rare WBCs.  No organisms seen.      Narrative:       Collected By:[id number] CHUCK BLUE.  Which Lobe (Bronch Only):->RML  Collected By:[id number] CHUCK FERNANDEZ    URINE CULTURE(NEW) [[id number]] Collected:  02/16/20 0425    Order Status:  Completed Specimen:  Urine Updated:  02/18/20 0728     Significant Indicator NEG     Source UR     Site -     Culture Result Mixed skin anitha <10,000 cfu/mL    Narrative:       Indication for culture:->Septic Shock: Persistent  hypotension,  Lactic acid > 4, vasopressors/inotropes started  Indication for culture:->Septic Shock: Persistent    Pneumocystis DFA [[id number]] Collected:  02/17/20 1821    Order Status:  Canceled Specimen:  Other from Bronchial Washings     CULTURE RESPIRATORY W/ GRM STN [[id number]] Collected:  02/17/20 1750    Order Status:  Canceled Specimen:  Other from Sputum     Quant Bronchial Washing [[id number]] Collected:  02/17/20 1750    Order Status:  Canceled Specimen:  Sputum     Culture Respiratory W/ Grm Stn - BAL [[id number]] Collected:  02/17/20 1750    Order Status:  Canceled Specimen:  Sputum     CULTURE RESPIRATORY W/ GRM STN [[id number]] Collected:  02/17/20 1750    Order Status:  Canceled Specimen:  Sputum     MRSA By PCR (Amp) [[id number]] Collected:  02/16/20 1315    Order  Status:  Completed Specimen:  Respirate from Nares Updated:  02/16/20 1901     Significant Indicator NEG     Source RESP     Site NARES     MRSA PCR Negative for MRSA by PCR.    Narrative:       Collected By:[id number] BERTRAM JACOME  Per [facility] Protocol  Collected By:[id number] BERTRAM JACOME    URINALYSIS [[id number]] Collected:  02/16/20 0425    Order Status:  Completed Specimen:  Urine Updated:  02/16/20 0517     Color Yellow     Character Clear     Specific Gravity 1.020     Ph 6.0     Glucose Negative mg/dL      Ketones Negative mg/dL      Protein Negative mg/dL      Bilirubin Negative     Urobilinogen, Urine 0.2     Nitrite Negative     Leukocyte Esterase Negative     Occult Blood Negative     Micro Urine Req see below     Comment: Microscopic examination not performed when specimen is clear  and chemically negative for protein, blood, leukocyte esterase  and nitrite.         Narrative:       Indication for culture:->Septic Shock: Persistent  hypotension,  Lactic acid > 4, vasopressors/inotropes started    Cryptococcal Antigen [[id number]] Collected:  02/16/20 0000    Order Status:  Canceled Specimen:  Blood     Influenza A/B By PCR (Adult - Flu Only) [[id number]] Collected:  02/15/20 1253    Order Status:  Completed Specimen:  Respirate from Nasopharyngeal Updated:  02/15/20 1337     Influenza virus A RNA Negative     Influenza virus B, PCR Negative    Narrative:       Indication for culture:->Septic Shock: Persistent  hypotension,  Lactic acid > 4, vasopressors/inotropes started          Assessment:  Active Hospital Problems    Diagnosis   • *Acute respiratory failure with hypoxia ([facility] [place]) [J96.01]   • Lung cancer, primary, with metastasis from lung to other site ([facility] [place]) [C34.90]   • COPD (chronic obstructive pulmonary disease) ([facility] [place]) [J44.9]   • Pulmonary fibrosis ([facility] [place]) [J84.10]   • Pneumonia [J18.9]       Plan:  Acute on chronic hypoxic respiratory failure, improved overall  2/2 below  Intubated on  2/17  Extubated on 2/18  On BiPAP today  Status post bronchoscopy with BAL on 2/17.  Patient was noted to have friable mucosa bilateral, bloody mucoid secretions.    Pneumonia/pulmonary opacities noted on imaging  Pneumocystis pneumonia  Patient was on high-dose steroids prior to admission so she is certainly at risk for opportunistic infections  PCP PCR from BAL - positive  Status post BAL on 2/17.  BAL cultures-negative to date  Serum galactomannan - negative  Cocci serology- pending  Fungitell - positive  Histo - negative  Continue Bactrim   Plan for 3 week course total with stop date of 3/13/2020 followed by PCP prophylaxis if patient remains on high dose steroids post treatment  On steroids  DC'd oral linezolid, and IV cefepime on 2/21  Respiratory viral panel - negative    Leukocytosis, persistent. Improving overall  Multifactorial (infection, high-dose steroids- currently on IV Solu-Medrol)  On antibiotics above  Slowly improving  Monitor    Metastatic squamous cell lung cancer   Status post chemoradiation  Previously on immunotherapy    Pulmonary fibrosis  Patient was seen by pulmonologist, Dr. Longo prior to admission  Patient was on high-dose steroid taper prior to admission, started by oncology  On Solu-Medrol    COPD/chronic hypoxia  On 3LNC baseline    I have performed a physical exam and reviewed and updated ROS and plan today 2/22/2020.  In review of yesterday's note 2/21/2020, there are no changes except as documented above.    Discussed with intensivist, Dr. Tovar

## 2020-02-22 NOTE — PROGRESS NOTES
Port attempted to re-access this AM with oncology charge RN using 1in needle. Line flushes easily, but still no blood return noted. Dr Tovar notified during rounds and orders for cathflo received. Cathflow instilled per policy and let to dwell 2 hours total with still no blood return and unable to aspirate cathflow from line. Alert label placed on line and patient educated on importance of not using line. Dr. Tovar notified and imaging orders placed.

## 2020-02-23 NOTE — PROGRESS NOTES
Infectious Disease Progress Note    Author: Lorri Worthy M.D. Date & Time of service: 2020  9:38 AM    Chief Complaint:  Follow-up for acute on chronic hypoxic respiratory failure, pulmonary infiltrates    Interval History:   afebrile WBC 20.9 patient intubated yesterday secondary to worsening respiratory distress.  Multiple cultures obtained and are currently pending.  Patient is awake on the vent and communicating by writing.  She is breathing comfortably with plans for possible extubation today.   afebrile WBC 20.4 patient extubated yesterday.  BAL cultures negative to date.  Beta D glucan positive.  Currently on BiPAP.  Reports minimal cough   afebrile, WBC 18.1 BAL path- no malignancy.  GMS stain negative for fungal organisms.  Patient feels slightly better today.  She however desats off of BiPAP   afebrile WBC 16 patient now on high flow nasal cannula and breathing better.  Multiple PCP PCR is negative from bronchoscopy specimen.  We will resume Bactrim today.  Respiratory viral panel negative   T-max 99.9 WBC 16.7.  Patient is on BiPAP.  She states that her breathing is comfortable and she is not feeling as tired.   afebrile WBC 19.7.  Patient has remained on BiPAP overnight.  She feels about the same.  No new symptoms to report    Labs Reviewed, Medications Reviewed and Radiology Reviewed.    Review of Systems:  Review of Systems   Constitutional: Negative for chills and fever.   Respiratory: Positive for cough and shortness of breath.         Improved   Gastrointestinal: Negative for abdominal pain, diarrhea, nausea and vomiting.   Neurological: Negative for dizziness and headaches.   All other systems reviewed and are negative.       Hemodynamics:  Temp (24hrs), Av.4 °C (97.6 °F), Min:36.4 °C (97.6 °F), Max:36.4 °C (97.6 °F)  Temperature: 36.4 °C (97.6 °F), Monitored Temp: 37.1 °C (98.8 °F)  Pulse  Av.8  Min: 58  Max: 132   Blood Pressure: 149/83       Physical  Exam:  Physical Exam  Constitutional:       Appearance: Normal appearance.   HENT:      Head:      Comments: BiPAP     Mouth/Throat:      Mouth: Mucous membranes are moist.   Eyes:      Extraocular Movements: Extraocular movements intact.      Conjunctiva/sclera: Conjunctivae normal.      Pupils: Pupils are equal, round, and reactive to light.   Neck:      Musculoskeletal: Normal range of motion and neck supple.   Cardiovascular:      Rate and Rhythm: Regular rhythm. Tachycardia present.      Comments: Right upper chest port in place-nontender, no surrounding erythema  Pulmonary:      Effort: Pulmonary effort is normal.      Breath sounds: Rales present.   Abdominal:      Palpations: Abdomen is soft.      Tenderness: There is no abdominal tenderness.   Musculoskeletal: Normal range of motion.   Skin:     General: Skin is warm and dry.   Neurological:      General: No focal deficit present.      Mental Status: She is alert and oriented to person, place, and time.      Cranial Nerves: No cranial nerve deficit.   Psychiatric:         Mood and Affect: Mood normal.         Behavior: Behavior normal.      Comments: Very pleasant         Meds:    Current Facility-Administered Medications:   •  furosemide  •  sulfamethoxazole-trimethoprim (Septra) IV  •  methylPREDNISolone **FOLLOWED BY** [START ON 2/28/2020] predniSONE **FOLLOWED BY** [START ON 3/6/2020] predniSONE  •  famotidine  •  calcium carbonate  •  vitamin D  •  promethazine  •  Notify provider if pain remains uncontrolled **AND** Use the numeric rating scale (NRS-11) on regular floors and Critical-Care Pain Observation Tool (CPOT) on ICUs/Trauma to assess pain **AND** Pulse Ox (Oximetry) **AND** Pharmacy Consult Request **AND** If patient difficult to arouse and/or has respiratory depression, stop any opiates that are currently infusing and call a Rapid Response. **AND** oxyCODONE immediate-release **AND** oxyCODONE immediate-release **AND** morphine injection  •   ondansetron  •  senna-docusate **AND** polyethylene glycol/lytes **AND** magnesium hydroxide **AND** bisacodyl  •  hydrALAZINE  •  guaiFENesin dextromethorphan  •  acetaminophen  •  ascorbic acid  •  MD Alert...Adult ICU Electrolyte Replacement per Pharmacy  •  lidocaine  •  labetalol  •  hydrALAZINE  •  enoxaparin  •  ondansetron  •  promethazine  •  prochlorperazine  •  Respiratory Therapy Consult  •  ipratropium-albuterol  •  ipratropium-albuterol    Labs:  Recent Labs     02/21/20  0630 02/22/20  0500 02/23/20  0440   WBC 16.0* 16.7* 19.7*   RBC 3.16* 3.50* 3.85*   HEMOGLOBIN 9.7* 10.8* 11.9*   HEMATOCRIT 29.9* 32.6* 35.3*   MCV 94.6 93.1 91.7   MCH 30.7 30.9 30.9   RDW 54.7* 53.0* 51.9*   PLATELETCT 190 192 227   MPV 9.3 9.4 9.3     Recent Labs     02/21/20  0630 02/22/20  0500 02/23/20  0440   SODIUM 139 134* 135   POTASSIUM 4.5 3.9 4.6   CHLORIDE 102 95* 94*   CO2 31 30 29   GLUCOSE 84 151* 96   BUN 29* 19 19     Recent Labs     02/21/20  0630 02/22/20  0500 02/23/20  0440   ALBUMIN 3.1* 3.6 3.6   CREATININE 0.48* 0.57 0.59       Imaging:  Dx-chest-portable (1 View)    Result Date: 2/18/2020 2/18/2020 3:10 AM HISTORY/REASON FOR EXAM: For indication of respiratory failure; For indication of respiratory failure TECHNIQUE/EXAM DESCRIPTION:  Single AP view of the chest. COMPARISON: Yesterday FINDINGS: Dobbhoff tube has been placed in the interim, terminating below the lower margin of the film within the abdomen.  Otherwise medical device position is stable. The cardiac silhouette appears within normal limits. The mediastinal contour appears within normal limits.  The central  pulmonary vasculature appears prominent and indistinct. The lungs appear well expanded bilaterally.  Hazy interstitial bilateral pulmonary opacities are seen. No significant pleural effusions are identified. The bony structures appear age-appropriate.     1.  Pulmonary edema and/or infiltrates are identified, which are stable since the  prior exam.    Dx-chest-portable (1 View)    Result Date: 2/17/2020 2/17/2020 6:02 PM HISTORY/REASON FOR EXAM:  POST INTUBATION. TECHNIQUE/EXAM DESCRIPTION AND NUMBER OF VIEWS: Single portable view of the chest. COMPARISON: 2/15/2020 FINDINGS: Endotracheal tube projects at the level of the clavicular heads. Right chest port projects over the SVC. The cardiomediastinal silhouette is stable. Interstitial and alveolar airspace opacities are again noted and increased on the right compared to prior. No definite pleural effusion or pneumothorax is identified.     Extensive interstitial and alveolar airspace opacities are increased on the right compared to prior. Findings may represent edema or multifocal pneumonia. Underlying mass is not excluded. Underlying emphysematous changes.    Dx-chest-portable (1 View)    Result Date: 2/15/2020  2/15/2020 11:50 AM HISTORY/REASON FOR EXAM: possible sepsis.. Shortness of breath. Lung carcinoma. TECHNIQUE/EXAM DESCRIPTION AND NUMBER OF VIEWS: Single AP view of the chest. COMPARISON: 11/15/2019 FINDINGS: Hardware: Right IJ portacatheter tip overlies the brachiocephalic confluence Lungs: Significant interval worsening consolidated opacity in the left lung with some volume loss noted. There is also worsening diffuse, bilateral reticular opacification Pleura:  Possible layering left pleural fluid Heart and mediastinum: Left heart border is no longer visualized, completely effaced. No gross cardiac silhouette enlargement     Significant interval worsening left perihilar/lingular consolidation with possible underlying Mass. Significant worsening diffuse reticular abnormal opacity is consistent with underlying interstitial lung disease. Superimposed atypical infection or edema is possible New left lung volume loss    Ir-cvc Port Placement > Age 5    Result Date: 1/30/2020 1/30/2020 9:34 AM HISTORY/REASON FOR EXAM:  Patient requires central venous access for chemotherapy. TECHNIQUE/EXAM  DESCRIPTION: Following informed consent patient was prepped and draped in the usual fashion.  A total of 14 cc of 1% lidocaine with epinephrine was utilized for local and subcutaneous anesthesia. SEDATION: 4 mg of Versed and 100 mcg of fentanyl were utilized for IV conscious sedation. The procedure was monitored continuously by the sedation nurse. 30 minutes of sedation time were utilized for the procedure. Fluoroscopy images: 3 fluoroscopic images obtained. Fluoroscopy time: 0.6 minutes The procedure was performed with MAXIMUM STERILE BARRIER TECHNIQUE including sterile gown, mask, cap, and done in a sterile gloves following appropriate hand hygiene and/or sterile scrub. The patient's skin site was prepped with 2% chlorhexidine solution. Ultrasound evaluation of the right internal jugular vein demonstrated patency and an image was archived in the PACS system. Utilizing ultrasonic and fluoroscopic guidance right internal jugular vein was accessed and an 8-Libyan sheath dilator was positioned in the right internal jugular vein.  Next utilizing a combination of blunt and sharp dissection the port pocket was created in the right infraclavicular fossa.  The port was subsequently attached to the catheter and utilizing a tunneling device a subcutaneous tract was created to the neck puncture site.  The catheter was pulled through the tunnel and trimmed to length.  Next the catheter was advanced through the peel-away sheath which was subsequently removed. The port pocket was closed with 4 deep 2-0 Vicryl sutures, the skin was closed Dermabond.  The  neck puncture site was closed with Dermabond and fluoroscopic images were obtained.  The patient tolerated procedure well. COMPARISON:  None FINDINGS: Fluoroscopic images revealed good positioning of the port in the right infraclavicular fossa.  There is a smooth arc of the catheter into the right internal jugular vein.  The distal tip of the catheter is at the caval atrial  junction.     Successful percutaneous placement of Port-A-Cath via right internal jugular approach utilizing ultrasonic and fluoroscopic guidance.    Ct-cta Chest Pulmonary Artery W/ Recons    Result Date: 2/15/2020  2/15/2020 10:22 PM HISTORY/REASON FOR EXAM:  Shortness of breath TECHNIQUE/EXAM DESCRIPTION: CT angiogram scan for pulmonary embolism with contrast, with reconstructions. 1.25 mm helical sections were obtained from the lung apices through the lung bases following the rapid bolus administration of 55 mL of Omnipaque 350 nonionic contrast. Thin-section overlapping reconstruction interval was utilized. Coronal reconstructions were generated from the axial data. MIP post processing was performed and utilized for the interpretation. Low dose optimization technique was utilized for this CT exam including automated exposure control and adjustment of the mA and/or kV according to patient size. COMPARISON: 1/14/2020 FINDINGS: Pulmonary Embolism: No. Main Pulmonary Arteries: No. Segmental branches: No. Subsegmental branches: No. Additional Comments: None. Lungs: There are increased extensive multifocal airspace groundglass and interstitial opacities. There is underlying emphysema and interstitial lung disease. There is a redemonstrated stellate area of masslike scarring in the left lower lobe measuring 2.6 x 3.1 cm, essentially unchanged from previous exam. Pleura: No pleural effusion. Nodes: No enlarged lymph nodes. Additional findings: There is a small to moderate sized hiatal hernia. There is diffuse low-attenuation of the hepatic parenchyma consistent with steatosis.     1.  No evidence of pulmonary embolism. 2.  Increased extensive multifocal airspace groundglass and interstitial opacities. Findings could be consistent with edema and/or infection. 3.  Underlying emphysema and interstitial lung disease. 4.  Redemonstrated stellate area of masslike scarring in the left lower lobe measuring 2.6 x 3.1 cm,  "essentially unchanged from the previous exam and consistent with known malignancy. 5.  Small to moderate sized hiatal hernia. 6.  Hepatic steatosis.     Dx-abdomen For Tube Placement    Result Date: 2/17/2020 2/17/2020 8:42 PM HISTORY/REASON FOR EXAM: cortrak placement TECHNIQUE/EXAM DESCRIPTION:  Single AP view the abdomen. COMPARISON:  None FINDINGS: Hazy bilateral lower lobe opacities are seen. Dobbhoff tube is seen, the tip lies to the right of the lumbar spine.  The bowel gas pattern appears nonspecific. The bony structures appear age-appropriate.     1.  Nonspecific bowel gas pattern. 2.  Dobbhoff tube tip terminates overlying the expected location of the gastric antrum. 3.  Hazy bilateral lung base infiltrates or edema.      Micro:  Results     Procedure Component Value Units Date/Time    BLOOD CULTURE [[id number]] Collected:  02/15/20 1155    Order Status:  Completed Specimen:  Blood from Peripheral Updated:  02/20/20 1500     Significant Indicator NEG     Source BLD     Site PERIPHERAL     Culture Result No growth after 5 days of incubation.    Narrative:       Per Hospital Policy: Only change Specimen Src: to \"Line\" if  specified by physician order.  Right Wrist    BLOOD CULTURE [[id number]] Collected:  02/15/20 1245    Order Status:  Completed Specimen:  Blood from Peripheral Updated:  02/20/20 1500     Significant Indicator NEG     Source BLD     Site PERIPHERAL     Culture Result No growth after 5 days of incubation.    Narrative:       Per Hospital Policy: Only change Specimen Src: to \"Line\" if  specified by physician order.  Right AC    CULTURE RESPIRATORY W/ GRM STN [[id number]] Collected:  02/17/20 1735    Order Status:  Completed Specimen:  Respirate from Sputum Updated:  02/19/20 0927     Significant Indicator NEG     Source RESP     Site Bronchoalveolar Lavage RML     Culture Result No growth at 48 hours.     Gram Stain Result Rare WBCs.  No organisms seen.      Narrative:       Collected " By:[id number] BLUE.  Which Lobe (Bronch Only):->RML  Collected By:[id number] FERNANDEZ    AFB Culture - BAL [[id number]] Collected:  02/17/20 1750    Order Status:  Completed Specimen:  Respirate from Sputum Updated:  02/19/20 0927     Significant Indicator NEG     Source RESP     Site Quantitative BAL LLL     Culture Result Culture in progress.     AFB Smear Results No acid fast bacilli seen.    Narrative:       Collected By:[id number] BLUE.  Which Lobe (Bronch Only):->LLL  Collected By:[id number] CHUCK BLUE.    Fungal Culture - BAL [[id number]] Collected:  02/17/20 1750    Order Status:  Completed Specimen:  Respirate from Sputum Updated:  02/19/20 0927     Significant Indicator NEG     Source RESP     Site Quantitative BAL LLL     Culture Result No fungal growth to date.    Narrative:       Collected By:[id number] BLUE.  Which Lobe (Bronch Only):->LLL  Collected By:[id number] CHUCK BLUE.    Fungal Smear - BAL [[id number]] Collected:  02/17/20 1750    Order Status:  Completed Specimen:  Respirate from Sputum Updated:  02/19/20 0927     Significant Indicator NEG     Source RESP     Site Quantitative BAL LLL     Fungal Smear Results No fungal elements seen.    Narrative:       Collected By:[id number] CHUCK BLUE.  Which Lobe (Bronch Only):->LLL  Collected By:[id number] CHUCK BLUE.    QUANT BRONCHIAL WASHING [[id number]] Collected:  02/17/20 1750    Order Status:  Completed Specimen:  Respirate Updated:  02/19/20 0927     Significant Indicator NEG     Source RESP     Site Quantitative BAL LLL     Culture Result No growth at 48 hours.     Gram Stain Result Rare WBCs.  No organisms seen.      Narrative:       Collected By:[id number] BLUE.  Which Lobe (Bronch Only):->LLL  Collected By:[id number] BLUE.    Acid Fast Stain [[id number]] Collected:  02/17/20 1750    Order Status:  Completed Specimen:  Respirate Updated:  02/18/20 1622     Significant Indicator NEG     Source RESP     Site  Quantitative BAL LLL     AFB Smear Results No acid fast bacilli seen.    Narrative:       Collected By:[id number] CHUCK BLUE.  Which Lobe (Bronch Only):->LLL  Collected By:[id number] CHUCK FERNANDEZ    GRAM STAIN [[id number]] Collected:  02/17/20 1750    Order Status:  Completed Specimen:  Respirate Updated:  02/18/20 1622     Significant Indicator .     Source RESP     Site Quantitative BAL LLL     Gram Stain Result Rare WBCs.  No organisms seen.      Narrative:       Collected By:[id number] CHUCK BLUE.  Which Lobe (Bronch Only):->LLL  Collected By:[id number] CHUCK FERNANDEZ    GRAM STAIN [[id number]] Collected:  02/17/20 1735    Order Status:  Completed Specimen:  Respirate Updated:  02/18/20 1320     Significant Indicator .     Source RESP     Site Bronchoalveolar Lavage RML     Gram Stain Result Rare WBCs.  No organisms seen.      Narrative:       Collected By:[id number] CHUCK BLUE.  Which Lobe (Bronch Only):->RML  Collected By:[id number] CHUCK BLUE.    URINE CULTURE(NEW) [[id number]] Collected:  02/16/20 0425    Order Status:  Completed Specimen:  Urine Updated:  02/18/20 0728     Significant Indicator NEG     Source UR     Site -     Culture Result Mixed skin anitha <10,000 cfu/mL    Narrative:       Indication for culture:->Septic Shock: Persistent  hypotension,  Lactic acid > 4, vasopressors/inotropes started  Indication for culture:->Septic Shock: Persistent    Pneumocystis DFA [[id number]] Collected:  02/17/20 1821    Order Status:  Canceled Specimen:  Other from Bronchial Washings     CULTURE RESPIRATORY W/ GRM STN [[id number]] Collected:  02/17/20 1750    Order Status:  Canceled Specimen:  Other from Sputum     Quant Bronchial Washing [[id number]] Collected:  02/17/20 1750    Order Status:  Canceled Specimen:  Sputum     Culture Respiratory W/ Grm Stn - BAL [[id number]] Collected:  02/17/20 1750    Order Status:  Canceled Specimen:  Sputum     CULTURE RESPIRATORY W/ GRM STN [[id number]] Collected:  02/17/20 1750     Order Status:  Canceled Specimen:  Sputum     MRSA By PCR (Amp) [[id number]] Collected:  02/16/20 1315    Order Status:  Completed Specimen:  Respirate from Nares Updated:  02/16/20 1901     Significant Indicator NEG     Source RESP     Site NARES     MRSA PCR Negative for MRSA by PCR.    Narrative:       Collected By:[id number] BERTRAM JACOME  Per [facility] Protocol  Collected By:[id number] BERTRAM JACOME          Assessment:  Active Hospital Problems    Diagnosis   • *Acute respiratory failure with hypoxia ([facility]) [J96.01]   • Lung cancer, primary, with metastasis from lung to other site ([facility]) [C34.90]   • COPD (chronic obstructive pulmonary disease) ([facility]) [J44.9]   • Pulmonary fibrosis ([facility]) [J84.10]   • Pneumonia [J18.9]       Plan:  Acute on chronic hypoxic respiratory failure, improved overall  2/2 below  Intubated on 2/17  Extubated on 2/18  Remains on BiPAP  Status post bronchoscopy with BAL on 2/17.  Patient was noted to have friable mucosa bilateral, bloody mucoid secretions.    Pneumonia/pulmonary opacities noted on imaging  Pneumocystis pneumonia  Patient was on high-dose steroids prior to admission so she is certainly at risk for opportunistic infections  PCP PCR from BAL - positive  Status post BAL on 2/17.  BAL cultures-negative to date  Serum galactomannan - negative  Cocci serology- negative  Fungitell - positive  Histo - negative  Continue Bactrim   Plan for 3 week course total with stop date of 3/13/2020 followed by PCP prophylaxis if patient remains on high dose steroids post treatment  On steroids  DC'd oral linezolid, and IV cefepime on 2/21  Respiratory viral panel - negative    Leukocytosis, persistent. Improving overall  Multifactorial (infection, high-dose steroids)  On antibiotics above  Monitor    Metastatic squamous cell lung cancer   Status post chemoradiation  Previously on immunotherapy    Pulmonary fibrosis  Patient was seen by pulmonologist, Dr. Longo prior to admission  Patient was on  high-dose steroid taper prior to admission, started by oncology  On Solu-Medrol    COPD/chronic hypoxia  On 3LNC baseline    I have performed a physical exam and reviewed and updated ROS and plan today 2/23/2020.  In review of yesterday's note 2/22/2020, there are no changes except as documented above.    Discussed with intensivist, Dr. Tovar

## 2020-02-23 NOTE — CARE PLAN
Problem: Communication  Goal: The ability to communicate needs accurately and effectively will improve  Outcome: PROGRESSING AS EXPECTED     Problem: Safety  Goal: Will remain free from injury  Outcome: PROGRESSING AS EXPECTED     Problem: Infection  Goal: Will remain free from infection  Outcome: PROGRESSING AS EXPECTED     Problem: Venous Thromboembolism (VTW)/Deep Vein Thrombosis (DVT) Prevention:  Goal: Patient will participate in Venous Thrombosis (VTE)/Deep Vein Thrombosis (DVT)Prevention Measures  Outcome: PROGRESSING AS EXPECTED     Problem: Bowel/Gastric:  Goal: Normal bowel function is maintained or improved  Outcome: PROGRESSING AS EXPECTED     Problem: Knowledge Deficit  Goal: Knowledge of disease process/condition, treatment plan, diagnostic tests, and medications will improve  Outcome: PROGRESSING AS EXPECTED     Problem: Pain Management  Goal: Pain level will decrease to patient's comfort goal  Outcome: PROGRESSING AS EXPECTED     Problem: Psychosocial Needs:  Goal: Level of anxiety will decrease  Outcome: PROGRESSING AS EXPECTED     Problem: Fluid Volume:  Goal: Will maintain balanced intake and output  Outcome: PROGRESSING AS EXPECTED

## 2020-02-23 NOTE — CARE PLAN
Problem: Safety  Goal: Will remain free from falls  Outcome: PROGRESSING AS EXPECTED  Intervention: Implement fall precautions  Flowsheets (Taken 2/22/2020 5751)  Bed Alarm: Alarm Not On  Environmental Precautions:   Treaded Slipper Socks on Patient   Personal Belongings, Wastebasket, Call Bell etc. in Easy Reach   Report Given to Other Health Care Providers Regarding Fall Risk   Bed in Low Position   Communication Sign for Patients & Families   Mobility Assessed & Appropriate Sign Placed  Note: Call light in hand and used appropriately, hourly rounding in place.      Problem: Mobility  Goal: Risk for activity intolerance will decrease  Outcome: PROGRESSING SLOWER THAN EXPECTED  Intervention: Assess and monitor signs of activity intolerance  Note: Unable to mobilize patient out of bed d/t immediate desaturation to 70s and lower. Pt only able to tolerate off BiPAP for ~45 mins before maintaining high 70s to low 80s SpO2 and being put back on BiPAP.

## 2020-02-23 NOTE — PROGRESS NOTES
Critical Care Progress Note    Date of admission  2/15/2020    Chief Complaint  65 yo female, former smoker 35pack year quit in 2012 with hx of squamous cell lung CA (dx in 9/2019, in [facility] mets to lymph nodes s/p chemoXRT (last 2 months ago), recently received immunotherapy (durvalumab IV) 3 weeks prior to admission), COPD (on 3L home O2), presented on 2/15 for worsening SOB x2-3 days. Pt was seen by pulmonary Dr. 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 Immunotherapy was held. She was started on high dose prednisone 80mg then taper in the past 3 weeks by Dr Callahan.      At admission, influenza A/B PCR is negative. pt was on oxymask 5L, sat >90% and progressively increasing to 8L oxymask. Afebrile, SBP in 100-110s. WBC 17K -12K. Creatinine 0.5, HCO3 26. Lactate 1.5. Procalcitonin 0.12. CT chest today showed increased extensive multifocal airspace ground glass and interstitial opacities. No PE. Started on solumedrol 125 Q6H. Pt initially started on unasyn, then changed to linezolid and cefepime. Pt was transferred to ICU for further evaluation Taken from Dr Christopher kan.     Hospital Course  Intubated [facility] 2/17 pulse dose steroids started   Extubated 2/18 to bipap and HFNC  2/19 still needing significant bipap PCP neg bactrim stopped  2/20 changed code status, still needing bipap and HFNC mild/minimal improvement in CXR  2/21 PJP +, restart bactrim, prednisone still needing bipap/HFNC with max setting offer if tires we can go on ventilator  2/22 spent most the day on bipap still spirits are high   2/23     Interval Problem Update  Reviewed last 24 hour events:  Neuro: aox4 generalized pain oxy  HR: 's  SBP: 120-150's  Tmax: afebrile  GI: regular diet, Last BM 2/21  UOP: good   Lines: port with tpa, peripheral IV, flores  Resp: bipap overnight 14/10 100% 60L 100% daniel q4, congestion cough   Vte: lovenox  PPI/H2:pepcid  Antibx: bactrim, steroids  CXR  unchanged      Review of Systems  Review of Systems   Constitutional: Negative for fever and malaise/fatigue.   HENT: Negative for ear discharge and nosebleeds.    Eyes: Negative for double vision and discharge.   Respiratory: Positive for shortness of breath. Negative for cough, sputum production and stridor.    Gastrointestinal: Negative for abdominal pain, constipation, heartburn, nausea and vomiting.   Genitourinary: Negative for dysuria and frequency.   Neurological: Negative for tremors, sensory change, speech change, weakness and headaches.   Psychiatric/Behavioral: Negative for depression and hallucinations. The patient is not nervous/anxious and does not have insomnia.         Vital Signs for last 24 hours   Temp:  [36.4 °C (97.6 °F)] 36.4 °C (97.6 °F)  Pulse:  [63-99] 78  Resp:  [15-27] 23  BP: (120-169)/(63-81) 145/72  SpO2:  [82 %-99 %] 97 %    Hemodynamic parameters for last 24 hours       Respiratory Information for the last 24 hours       Physical Exam   Physical Exam  Constitutional:       Appearance: She is not ill-appearing, toxic-appearing or diaphoretic.      Comments: Sitting in bed, on bipap   HENT:      Mouth/Throat:      Mouth: Mucous membranes are moist.      Comments: Bipap  Eyes:      Pupils: Pupils are equal, round, and reactive to light.   Neck:      Musculoskeletal: No neck rigidity or muscular tenderness.   Cardiovascular:      Rate and Rhythm: Normal rate.      Heart sounds: No murmur.   Pulmonary:      Effort: No respiratory distress.      Breath sounds: No stridor. No wheezing, rhonchi or rales.      Comments: Bilateral distant breath sounds no wheezing or rales fine crackles increase work of breathing quickly desaturates on Bipap  Chest:      Chest wall: No tenderness.   Abdominal:      General: There is no distension.      Palpations: There is no mass.      Tenderness: There is no abdominal tenderness. There is no guarding or rebound.      Hernia: No hernia is present.    Musculoskeletal:         General: No swelling or tenderness.      Comments: Clubbing bilateral   Neurological:      Mental Status: She is alert and oriented to person, place, and time.      Cranial Nerves: No cranial nerve deficit.      Sensory: No sensory deficit.      Motor: No weakness.      Coordination: Coordination normal.      Gait: Gait normal.      Comments: Answers quickly moves all extremities   Psychiatric:         Mood and Affect: Mood normal.         Medications  Current Facility-Administered Medications   Medication Dose Route Frequency Provider Last Rate Last Dose   • furosemide (LASIX) injection 20 mg  20 mg Intravenous BID DIURETIC Kenton Tovar M.D.   20 mg at 02/23/20 0550   • sulfamethoxazole-trimethoprim (SEPTRA) 336 mg of trimethoprim in D5W 500 mL IVPB  15 mg/kg/day of trimethoprim Intravenous Q6HRS Kenton Tovar M.D.   Stopped at 02/23/20 0334   • methylPREDNISolone (SOLU-MEDROL) 40 MG injection 40 mg  40 mg Intravenous Q12HRS Kenton Tovar M.D.   40 mg at 02/23/20 0550    Followed by   • [START ON 2/28/2020] predniSONE (DELTASONE) tablet 40 mg  40 mg Oral DAILY Kenton Tovar M.D.        Followed by   • [START ON 3/6/2020] predniSONE (DELTASONE) tablet 20 mg  20 mg Oral DAILY Kenton Tovar M.D.       • famotidine (PEPCID) tablet 20 mg  20 mg Oral BID Kenton Tovar M.D.   20 mg at 02/23/20 0550   • calcium carbonate (TUMS) chewable tab 500 mg  500 mg Oral DAILY Kenton Tovar M.D.   500 mg at 02/23/20 0550   • vitamin D (cholecalciferol) tablet 2,000 Units  2,000 Units Oral DAILY Kenton Tovar M.D.   2,000 Units at 02/23/20 0550   • promethazine (PHENERGAN) tablet 12.5-25 mg  12.5-25 mg Oral Q4HRS PRN Kenton Tovar M.D.       • oxyCODONE immediate release (ROXICODONE) tablet 10 mg  10 mg Oral Q3HRS PRN Kenton Tovar M.D.        And   • Pharmacy Consult Request ...Pain Management Review 1 Each  1 Each Other PHARMACY TO DOSE Kenton Tovar M.D.         And   • oxyCODONE immediate-release (ROXICODONE) tablet 5 mg  5 mg Oral Q3HRS PRN Kenton Tovar M.D.   5 mg at 02/22/20 2011    And   • morphine (pf) 4 MG/ML injection 4 mg  4 mg Intravenous Q3HRS PRN Kenton Tovar M.D.       • ondansetron (ZOFRAN ODT) dispertab 4 mg  4 mg Oral Q4HRS PRN Kenton Tovar M.D.   Stopped at 02/20/20 0924   • magnesium hydroxide (MILK OF MAGNESIA) suspension 30 mL  30 mL Oral QDAY PRN Kenton Tovar M.D.        And   • senna-docusate (PERICOLACE or SENOKOT S) 8.6-50 MG per tablet 2 Tab  2 Tab Oral BID Kenton Tovar M.D.   2 Tab at 02/23/20 0550    And   • polyethylene glycol/lytes (MIRALAX) PACKET 1 Packet  1 Packet Oral QDAY PRN Kenton Tovar M.D.   1 Packet at 02/20/20 1725    And   • bisacodyl (DULCOLAX) suppository 10 mg  10 mg Rectal QDAY PRN Kenton Tovar M.D.   10 mg at 02/21/20 1056   • hydrALAZINE (APRESOLINE) tablet 10 mg  10 mg Oral Q8HRS PRN Kenton Tovar M.D.       • guaiFENesin dextromethorphan (ROBITUSSIN DM) 100-10 MG/5ML syrup 10 mL  10 mL Oral Q6HRS PRN Kenton Tovar M.D.       • acetaminophen (TYLENOL) tablet 650 mg  650 mg Oral Q6HRS PRN Kenton Tovar M.D.       • ascorbic acid tablet 1,000 mg  1,000 mg Oral DAILY Kenton Tovar M.D.   1,000 mg at 02/23/20 0550   • MD Alert...ICU Electrolyte Replacement per Pharmacy   Other PHARMACY TO DOSE Kenton Tovar M.D.       • lidocaine (XYLOCAINE) 1 % injection 1-2 mL  1-2 mL Tracheal Tube Q30 MIN PRN Kenton Tovar M.D.       • labetalol (NORMODYNE/TRANDATE) injection 10 mg  10 mg Intravenous Q4HRS PRN Kenton Tovar M.D.   10 mg at 02/17/20 1805   • hydrALAZINE (APRESOLINE) injection 20 mg  20 mg Intravenous Q4HRS PRN Lazaro Benites M.D.   20 mg at 02/17/20 2023   • enoxaparin (LOVENOX) inj 40 mg  40 mg Subcutaneous DAILY Dino Sykes M.D.   40 mg at 02/23/20 0551   • ondansetron (ZOFRAN) syringe/vial injection 4 mg  4 mg Intravenous Q4HRS PRN Dino Sykes,  M.D.   4 mg at 02/20/20 1004   • promethazine (PHENERGAN) suppository 12.5-25 mg  12.5-25 mg Rectal Q4HRS PRN Dino Sykes M.D.       • prochlorperazine (COMPAZINE) injection 5-10 mg  5-10 mg Intravenous Q4HRS PRN Dino Sykes M.D.       • Respiratory Therapy Consult   Nebulization Continuous RT Dino Sykes M.D.       • ipratropium-albuterol (DUONEB) nebulizer solution  3 mL Nebulization Q4HRS (RT) Dino Sykes M.D.   3 mL at 02/23/20 0300   • ipratropium-albuterol (DUONEB) nebulizer solution  3 mL Nebulization Q2HRS PRN (RT) Dino Sykes M.D.           Fluids    Intake/Output Summary (Last 24 hours) at 2/23/2020 0642  Last data filed at 2/23/2020 0600  Gross per 24 hour   Intake 2964.33 ml   Output 3275 ml   Net -310.67 ml       Laboratory          Recent Labs     02/21/20  0630 02/22/20  0500 02/23/20  0440   SODIUM 139 134* 135   POTASSIUM 4.5 3.9 4.6   CHLORIDE 102 95* 94*   CO2 31 30 29   BUN 29* 19 19   CREATININE 0.48* 0.57 0.59   MAGNESIUM 2.2 2.1 2.0   PHOSPHORUS 3.5 3.1 3.3   CALCIUM 8.5 8.7 8.7     Recent Labs     02/21/20  0630 02/22/20  0500 02/23/20  0440   GLUCOSE 84 151* 96     Recent Labs     02/21/20  0630 02/22/20  0500 02/23/20  0440   WBC 16.0* 16.7* 19.7*     Recent Labs     02/21/20  0630 02/22/20  0500 02/22/20  1545 02/23/20  0440   RBC 3.16* 3.50*  --  3.85*   HEMOGLOBIN 9.7* 10.8*  --  11.9*   HEMATOCRIT 29.9* 32.6*  --  35.3*   PLATELETCT 190 192  --  227   PROTHROMBTM  --   --  13.7  --    APTT  --   --  25.1  --    INR  --   --  1.03  --        Imaging  X-Ray:  I have personally reviewed the images and compared with prior images. and My impression is: no significant change  CT:    Reviewed    Assessment/Plan  * Acute respiratory failure with hypoxia (HCC)  Assessment & Plan  Intubated 2/17 for diagnostic workup -> extubated 2/18    Discussed with Dr Esposito pulmonary 2/17, Dr Longo 2/18  Progression of diffuse airspace opacities with chronic fibrotic changes in  the background of emphysema   Found to have PJP pneumonia  Linezolid, Cefepimine d/c 2/21    HFNC and bipap monitor need for intubation inc Epap  Patient would not want trach or chronic ventilator dependency   Solumedrol 1gr IV x3 days 2/17-2/19 -> changed to PJP prednisone equivalent   Respiratory viral PCR negative  Check fungitell, aspergillus, cryptococcus antigen, cocci Ab, s/p BAL for silver stain, PJP DFA, afb, fungal cx, cd4/8 ratio, cell diff, culture/BAL from [facility] and [facility]  Dry fluid balance as much as tolerate being careful with IV bactrim and renal toxicity  Up to chair and IS as much as possible w/ movement needs to use bipap.       Lung cancer, primary, with metastasis from lung to other site (HCC)- (present on admission)  Assessment & Plan  Hx of squamous cell lung CA (dx in 9/2019, in [facility] mets to lymph nodes   s/p chemoXRT (last 2 months ago)  recently received immunotherapy (durvalumab IV) 3 weeks prior to admission      Pneumocystis jiroveci pneumonia (HCC)  Assessment & Plan  Continue IV Bactrim monitor volume (2l per day) and diuretics to keep even and renal toxicity  Steroids for severe PCP pna  Prednisone 40mg BID x5 days  Prednisone 40mg QD x5 days  Prednisone 20mg QD x11 days  Or IV solumedrol equivalent since her stomach is upset continue pepcid tums for steroid induced gastritis    Leukocytosis  Assessment & Plan  Serial monitor steroid vs infectious       Pneumonia  Assessment & Plan  Currently treating broadly  Follow on up BAL [facility], [facility], PJP DFA, silver stain, AFB, legionella and strep antigen, viral panel sent 2/20, Cocci, fungal, flu negative  ID following  Bactrim d/c 2/19, restarted 2/21 with + PJP, continue Linezolid, Cefepime    Found to have PJP pneumonia  Continue IV bactrim and steroids    Pulmonary fibrosis (HCC)  Assessment & Plan  Seen on CT 1/14/2020 and 2/15/202    Pulmonary HTN (HCC)- (present on admission)  Assessment & Plan  Prior echo with RVSP 45 related to chronic lung  disease  Dry fluid volume as tolerated    Macrocytic anemia- (present on admission)  Assessment & Plan  Serial monitor hg restrictive transfusion hg < 7    COPD (chronic obstructive pulmonary disease) (HCC)- (present on admission)  Assessment & Plan  Background COPD with 2-3L home O2       VTE:  Lovenox  Ulcer: H2 Antagonist  Lines: Whyte Catheter  Ongoing indication addressed    I have performed a physical exam and reviewed and updated ROS and Plan today (2/23/2020). In review of yesterday's note (2/22/2020), there are no changes except as documented above.     Discussed patient condition and risk of morbidity and/or mortality with RN, RT, Pharmacy, Charge nurse / hot rounds, Patient and infectious disease     The patient remains critically ill with max non invasive oxygen on HFNC and bipap with active titration.  Critical care time = 40 minutes in directly providing and coordinating critical care and extensive data review.  No time overlap and excludes procedures.

## 2020-02-23 NOTE — CARE PLAN
"  Problem: Bowel/Gastric:  Goal: Will not experience complications related to bowel motility  Outcome: PROGRESSING AS EXPECTED  Intervention: Assess baseline bowel pattern  Note: Pt's appetite and diet is decreased d/t fatigue and limited ability to tolerate hi flow (vs bipap).  Ergo, Bms are decreased.  Pt expressed she will advise when she starts feeling \"constipated.\"     Problem: Urinary Elimination:  Goal: Ability to reestablish a normal urinary elimination pattern will improve  Outcome: PROGRESSING SLOWER THAN EXPECTED  Intervention: Evaluate need to continue indwelling urinary catheter  Note: Pt continues to desat with even minimal exertion/movement.  Whyte to remain in place for risk of increased o2 demand which results from turns and lowering HOB.     "

## 2020-02-24 NOTE — CARE PLAN
Problem: Safety  Goal: Will remain free from falls  Outcome: PROGRESSING AS EXPECTED  Intervention: Implement fall precautions  Flowsheets (Taken 2/24/2020 0102)  Environmental Precautions:   Treaded Slipper Socks on Patient   Personal Belongings, Wastebasket, Call Bell etc. in Easy Reach   Report Given to Other Health Care Providers Regarding Fall Risk   Bed in Low Position   Communication Sign for Patients & Families   Mobility Assessed & Appropriate Sign Placed  Note: Bed alarm in use, call light in hand and used appropriately, hourly rounding in place.      Problem: Mobility  Goal: Risk for activity intolerance will decrease  Outcome: PROGRESSING SLOWER THAN EXPECTED  Intervention: Assess and monitor signs of activity intolerance  Note: Unable to mobilize patient. Pt desatted down to 40s while turning side to side with sheet change.

## 2020-02-24 NOTE — PROGRESS NOTES
Explained to patient about the importance of her nutritional intake and poor tolerance to her being on the hiflow nasal cannula. Patient is anxious to come off bipap and cannot tolerate hiflow well without O2 in the 70s. Explained to her the importance of needing a cortrak for tube feeds for nutritional support. Patient refusing at the time. Does not want tube feed in place.

## 2020-02-24 NOTE — CARE PLAN
Pt. On Bipap all night with periodic episodes of desaturation.   Pt. Is on Bipap 14/10 and 80%.  Q4 medication nebulizer.

## 2020-02-24 NOTE — DIETARY
Brief Nutrition Services Note: TF to start discussed in rounds due to pt being on bipap with increased oxygen needs; however, pt refusing cortrak despite education on importance. RN states goal is to continue to work with pt, MD aware and will address TF with pt as well. RN explains pt on Regular diet but desats quickly when changed to hi flow and PO intake is very difficult. Reviewed with MD, MD wants pt NPO for now as pt only safe for TF due to increased risk for aspiration. Plan/Rec:  RD to follow for plan of care.

## 2020-02-24 NOTE — PROGRESS NOTES
Infectious Disease Progress Note    Author: Tana Calhoun M.D. Date & Time of service: 2020  9:26 AM    Chief Complaint:  Follow-up for acute on chronic hypoxic respiratory failure, pulmonary infiltrates    Interval History:   afebrile WBC 20.9 patient intubated yesterday secondary to worsening respiratory distress.  Multiple cultures obtained and are currently pending.  Patient is awake on the vent and communicating by writing.  She is breathing comfortably with plans for possible extubation today.   afebrile WBC 20.4 patient extubated yesterday.  BAL cultures negative to date.  Beta D glucan positive.  Currently on BiPAP.  Reports minimal cough   afebrile, WBC 18.1 BAL path- no malignancy.  GMS stain negative for fungal organisms.  Patient feels slightly better today.  She however desats off of BiPAP   afebrile WBC 16 patient now on high flow nasal cannula and breathing better.  Multiple PCP PCR is negative from bronchoscopy specimen.  We will resume Bactrim today.  Respiratory viral panel negative   T-max 99.9 WBC 16.7.  Patient is on BiPAP.  She states that her breathing is comfortable and she is not feeling as tired.   afebrile WBC 19.7.  Patient has remained on BiPAP overnight.  She feels about the same.  No new symptoms to report  2020-no fevers.  The WBC count  22,000.  Creatinine is 0.59 continues to remain short of breath.  Labs Reviewed, Medications Reviewed and Radiology Reviewed.    Review of Systems:  Review of Systems   Constitutional: Negative for chills and fever.   Respiratory: Positive for cough and shortness of breath.         Persistent   Cardiovascular: Negative for chest pain.   Gastrointestinal: Negative for abdominal pain, diarrhea, nausea and vomiting.   Neurological: Negative for dizziness and headaches.   All other systems reviewed and are negative.       Hemodynamics:  Temp (24hrs), Av.2 °C (98.9 °F), Min:37.1 °C (98.8 °F), Max:37.2 °C (99  °F)  Temperature: 37.1 °C (98.8 °F), Monitored Temp: 37.1 °C (98.8 °F)  Pulse  Av.4  Min: 58  Max: 132   Blood Pressure: 138/76       Physical Exam:  Physical Exam  Constitutional:       Appearance: Normal appearance.   HENT:      Head:      Comments: BiPAP     Mouth/Throat:      Mouth: Mucous membranes are moist.   Eyes:      Extraocular Movements: Extraocular movements intact.      Conjunctiva/sclera: Conjunctivae normal.      Pupils: Pupils are equal, round, and reactive to light.   Neck:      Musculoskeletal: Normal range of motion and neck supple.   Cardiovascular:      Rate and Rhythm: Regular rhythm. Tachycardia present.      Comments: Right upper chest port in place-nontender, no surrounding erythema  Pulmonary:      Effort: Pulmonary effort is normal.      Breath sounds: Rales present.   Abdominal:      Palpations: Abdomen is soft.      Tenderness: There is no abdominal tenderness.   Musculoskeletal: Normal range of motion.      Right lower leg: Edema present.      Left lower leg: Edema present.   Skin:     General: Skin is warm and dry.   Neurological:      General: No focal deficit present.      Mental Status: She is alert and oriented to person, place, and time.      Cranial Nerves: No cranial nerve deficit.   Psychiatric:         Mood and Affect: Mood normal.         Behavior: Behavior normal.      Comments: Very pleasant         Meds:    Current Facility-Administered Medications:   •  furosemide  •  sulfamethoxazole-trimethoprim (Septra) IV  •  methylPREDNISolone **FOLLOWED BY** [START ON 2020] predniSONE **FOLLOWED BY** [START ON 3/6/2020] predniSONE  •  famotidine  •  calcium carbonate  •  vitamin D  •  promethazine  •  Notify provider if pain remains uncontrolled **AND** Use the numeric rating scale (NRS-11) on regular floors and Critical-Care Pain Observation Tool (CPOT) on ICUs/Trauma to assess pain **AND** Pulse Ox (Oximetry) **AND** Pharmacy Consult Request **AND** If patient difficult  to arouse and/or has respiratory depression, stop any opiates that are currently infusing and call a Rapid Response. **AND** oxyCODONE immediate-release **AND** oxyCODONE immediate-release **AND** morphine injection  •  ondansetron  •  senna-docusate **AND** polyethylene glycol/lytes **AND** magnesium hydroxide **AND** bisacodyl  •  hydrALAZINE  •  guaiFENesin dextromethorphan  •  acetaminophen  •  ascorbic acid  •  MD Alert...Adult ICU Electrolyte Replacement per Pharmacy  •  lidocaine  •  labetalol  •  hydrALAZINE  •  enoxaparin  •  ondansetron  •  promethazine  •  prochlorperazine  •  Respiratory Therapy Consult  •  ipratropium-albuterol  •  ipratropium-albuterol    Labs:  Recent Labs     02/22/20  0500 02/23/20  0440 02/24/20  0224   WBC 16.7* 19.7* 22.1*   RBC 3.50* 3.85* 4.35   HEMOGLOBIN 10.8* 11.9* 13.0   HEMATOCRIT 32.6* 35.3* 39.7   MCV 93.1 91.7 91.3   MCH 30.9 30.9 29.9   RDW 53.0* 51.9* 52.0*   PLATELETCT 192 227 255   MPV 9.4 9.3 9.2     Recent Labs     02/22/20  0500 02/23/20  0440 02/24/20  0224   SODIUM 134* 135 132*   POTASSIUM 3.9 4.6 4.6   CHLORIDE 95* 94* 91*   CO2 30 29 28   GLUCOSE 151* 96 96   BUN 19 19 19     Recent Labs     02/22/20  0500 02/23/20  0440 02/24/20  0224   ALBUMIN 3.6 3.6 3.9   CREATININE 0.57 0.59 0.59       Imaging:  Dx-chest-portable (1 View)    Result Date: 2/18/2020 2/18/2020 3:10 AM HISTORY/REASON FOR EXAM: For indication of respiratory failure; For indication of respiratory failure TECHNIQUE/EXAM DESCRIPTION:  Single AP view of the chest. COMPARISON: Yesterday FINDINGS: Dobbhoff tube has been placed in the interim, terminating below the lower margin of the film within the abdomen.  Otherwise medical device position is stable. The cardiac silhouette appears within normal limits. The mediastinal contour appears within normal limits.  The central  pulmonary vasculature appears prominent and indistinct. The lungs appear well expanded bilaterally.  Hazy interstitial  bilateral pulmonary opacities are seen. No significant pleural effusions are identified. The bony structures appear age-appropriate.     1.  Pulmonary edema and/or infiltrates are identified, which are stable since the prior exam.    Dx-chest-portable (1 View)    Result Date: 2/17/2020 2/17/2020 6:02 PM HISTORY/REASON FOR EXAM:  POST INTUBATION. TECHNIQUE/EXAM DESCRIPTION AND NUMBER OF VIEWS: Single portable view of the chest. COMPARISON: 2/15/2020 FINDINGS: Endotracheal tube projects at the level of the clavicular heads. Right chest port projects over the SVC. The cardiomediastinal silhouette is stable. Interstitial and alveolar airspace opacities are again noted and increased on the right compared to prior. No definite pleural effusion or pneumothorax is identified.     Extensive interstitial and alveolar airspace opacities are increased on the right compared to prior. Findings may represent edema or multifocal pneumonia. Underlying mass is not excluded. Underlying emphysematous changes.    Dx-chest-portable (1 View)    Result Date: 2/15/2020  2/15/2020 11:50 AM HISTORY/REASON FOR EXAM: possible sepsis.. Shortness of breath. Lung carcinoma. TECHNIQUE/EXAM DESCRIPTION AND NUMBER OF VIEWS: Single AP view of the chest. COMPARISON: 11/15/2019 FINDINGS: Hardware: Right IJ portacatheter tip overlies the brachiocephalic confluence Lungs: Significant interval worsening consolidated opacity in the left lung with some volume loss noted. There is also worsening diffuse, bilateral reticular opacification Pleura:  Possible layering left pleural fluid Heart and mediastinum: Left heart border is no longer visualized, completely effaced. No gross cardiac silhouette enlargement     Significant interval worsening left perihilar/lingular consolidation with possible underlying Mass. Significant worsening diffuse reticular abnormal opacity is consistent with underlying interstitial lung disease. Superimposed atypical infection or  edema is possible New left lung volume loss    Ir-cvc Port Placement > Age 5    Result Date: 1/30/2020 1/30/2020 9:34 AM HISTORY/REASON FOR EXAM:  Patient requires central venous access for chemotherapy. TECHNIQUE/EXAM DESCRIPTION: Following informed consent patient was prepped and draped in the usual fashion.  A total of 14 cc of 1% lidocaine with epinephrine was utilized for local and subcutaneous anesthesia. SEDATION: 4 mg of Versed and 100 mcg of fentanyl were utilized for IV conscious sedation. The procedure was monitored continuously by the sedation nurse. 30 minutes of sedation time were utilized for the procedure. Fluoroscopy images: 3 fluoroscopic images obtained. Fluoroscopy time: 0.6 minutes The procedure was performed with MAXIMUM STERILE BARRIER TECHNIQUE including sterile gown, mask, cap, and done in a sterile gloves following appropriate hand hygiene and/or sterile scrub. The patient's skin site was prepped with 2% chlorhexidine solution. Ultrasound evaluation of the right internal jugular vein demonstrated patency and an image was archived in the PACS system. Utilizing ultrasonic and fluoroscopic guidance right internal jugular vein was accessed and an 8-Urdu sheath dilator was positioned in the right internal jugular vein.  Next utilizing a combination of blunt and sharp dissection the port pocket was created in the right infraclavicular fossa.  The port was subsequently attached to the catheter and utilizing a tunneling device a subcutaneous tract was created to the neck puncture site.  The catheter was pulled through the tunnel and trimmed to length.  Next the catheter was advanced through the peel-away sheath which was subsequently removed. The port pocket was closed with 4 deep 2-0 Vicryl sutures, the skin was closed Dermabond.  The  neck puncture site was closed with Dermabond and fluoroscopic images were obtained.  The patient tolerated procedure well. COMPARISON:  None FINDINGS:  Fluoroscopic images revealed good positioning of the port in the right infraclavicular fossa.  There is a smooth arc of the catheter into the right internal jugular vein.  The distal tip of the catheter is at the caval atrial junction.     Successful percutaneous placement of Port-A-Cath via right internal jugular approach utilizing ultrasonic and fluoroscopic guidance.    Ct-cta Chest Pulmonary Artery W/ Recons    Result Date: 2/15/2020  2/15/2020 10:22 PM HISTORY/REASON FOR EXAM:  Shortness of breath TECHNIQUE/EXAM DESCRIPTION: CT angiogram scan for pulmonary embolism with contrast, with reconstructions. 1.25 mm helical sections were obtained from the lung apices through the lung bases following the rapid bolus administration of 55 mL of Omnipaque 350 nonionic contrast. Thin-section overlapping reconstruction interval was utilized. Coronal reconstructions were generated from the axial data. MIP post processing was performed and utilized for the interpretation. Low dose optimization technique was utilized for this CT exam including automated exposure control and adjustment of the mA and/or kV according to patient size. COMPARISON: 1/14/2020 FINDINGS: Pulmonary Embolism: No. Main Pulmonary Arteries: No. Segmental branches: No. Subsegmental branches: No. Additional Comments: None. Lungs: There are increased extensive multifocal airspace groundglass and interstitial opacities. There is underlying emphysema and interstitial lung disease. There is a redemonstrated stellate area of masslike scarring in the left lower lobe measuring 2.6 x 3.1 cm, essentially unchanged from previous exam. Pleura: No pleural effusion. Nodes: No enlarged lymph nodes. Additional findings: There is a small to moderate sized hiatal hernia. There is diffuse low-attenuation of the hepatic parenchyma consistent with steatosis.     1.  No evidence of pulmonary embolism. 2.  Increased extensive multifocal airspace groundglass and interstitial  "opacities. Findings could be consistent with edema and/or infection. 3.  Underlying emphysema and interstitial lung disease. 4.  Redemonstrated stellate area of masslike scarring in the left lower lobe measuring 2.6 x 3.1 cm, essentially unchanged from the previous exam and consistent with known malignancy. 5.  Small to moderate sized hiatal hernia. 6.  Hepatic steatosis.     Dx-abdomen For Tube Placement    Result Date: 2/17/2020 2/17/2020 8:42 PM HISTORY/REASON FOR EXAM: cortrak placement TECHNIQUE/EXAM DESCRIPTION:  Single AP view the abdomen. COMPARISON:  None FINDINGS: Hazy bilateral lower lobe opacities are seen. Dobbhoff tube is seen, the tip lies to the right of the lumbar spine.  The bowel gas pattern appears nonspecific. The bony structures appear age-appropriate.     1.  Nonspecific bowel gas pattern. 2.  Dobbhoff tube tip terminates overlying the expected location of the gastric antrum. 3.  Hazy bilateral lung base infiltrates or edema.      Micro:  Results     Procedure Component Value Units Date/Time    BLOOD CULTURE [[id number]] Collected:  02/15/20 1155    Order Status:  Completed Specimen:  Blood from Peripheral Updated:  02/20/20 1500     Significant Indicator NEG     Source BLD     Site PERIPHERAL     Culture Result No growth after 5 days of incubation.    Narrative:       Per Hospital Policy: Only change Specimen Src: to \"Line\" if  specified by physician order.  Right Wrist    BLOOD CULTURE [[id number]] Collected:  02/15/20 1245    Order Status:  Completed Specimen:  Blood from Peripheral Updated:  02/20/20 1500     Significant Indicator NEG     Source BLD     Site PERIPHERAL     Culture Result No growth after 5 days of incubation.    Narrative:       Per Hospital Policy: Only change Specimen Src: to \"Line\" if  specified by physician order.  Right AC    CULTURE RESPIRATORY W/ GRM STN [[id number]] Collected:  02/17/20 1735    Order Status:  Completed Specimen:  Respirate from Sputum Updated:  " 02/19/20 0927     Significant Indicator NEG     Source RESP     Site Bronchoalveolar Lavage RML     Culture Result No growth at 48 hours.     Gram Stain Result Rare WBCs.  No organisms seen.      Narrative:       Collected By:[id number] BLUE.  Which Lobe (Bronch Only):->RML  Collected By:[id number] BLUE.    AFB Culture - BAL [[id number]] Collected:  02/17/20 1750    Order Status:  Completed Specimen:  Respirate from Sputum Updated:  02/19/20 0927     Significant Indicator NEG     Source RESP     Site Quantitative BAL LLL     Culture Result Culture in progress.     AFB Smear Results No acid fast bacilli seen.    Narrative:       Collected By:[id number] BLUE.  Which Lobe (Bronch Only):->LLL  Collected By:[id number] BLUE.    Fungal Culture - BAL [[id number]] Collected:  02/17/20 1750    Order Status:  Completed Specimen:  Respirate from Sputum Updated:  02/19/20 0927     Significant Indicator NEG     Source RESP     Site Quantitative BAL LLL     Culture Result No fungal growth to date.    Narrative:       Collected By:[id number] BLUE.  Which Lobe (Bronch Only):->LLL  Collected By:[id number] CHUCK BLUE.    Fungal Smear - BAL [[id number]] Collected:  02/17/20 1750    Order Status:  Completed Specimen:  Respirate from Sputum Updated:  02/19/20 0927     Significant Indicator NEG     Source RESP     Site Quantitative BAL LLL     Fungal Smear Results No fungal elements seen.    Narrative:       Collected By:[id number] BLUE.  Which Lobe (Bronch Only):->LLL  Collected By:[id number] BLUE.    QUANT BRONCHIAL WASHING [[id number]] Collected:  02/17/20 1750    Order Status:  Completed Specimen:  Respirate Updated:  02/19/20 0927     Significant Indicator NEG     Source RESP     Site Quantitative BAL LLL     Culture Result No growth at 48 hours.     Gram Stain Result Rare WBCs.  No organisms seen.      Narrative:       Collected By:Hoda FERNANDEZ  Which Lobe (Bronch  Only):->LLL  Collected By:[id number] CHUCK FERNANDEZ    Acid Fast Stain [[id number]] Collected:  02/17/20 1750    Order Status:  Completed Specimen:  Respirate Updated:  02/18/20 1622     Significant Indicator NEG     Source RESP     Site Quantitative BAL LLL     AFB Smear Results No acid fast bacilli seen.    Narrative:       Collected By:[id number] CHUCK BLUE.  Which Lobe (Bronch Only):->LLL  Collected By:[id number] CHUCK FERNANDEZ    GRAM STAIN [[id number]] Collected:  02/17/20 1750    Order Status:  Completed Specimen:  Respirate Updated:  02/18/20 1622     Significant Indicator .     Source RESP     Site Quantitative BAL LLL     Gram Stain Result Rare WBCs.  No organisms seen.      Narrative:       Collected By:[id number] CHUCK BLUE.  Which Lobe (Bronch Only):->LLL  Collected By:[id number] CHUCK FERNANDEZ    GRAM STAIN [[id number]] Collected:  02/17/20 1735    Order Status:  Completed Specimen:  Respirate Updated:  02/18/20 1320     Significant Indicator .     Source RESP     Site Bronchoalveolar Lavage RML     Gram Stain Result Rare WBCs.  No organisms seen.      Narrative:       Collected By:[id number] CHUCK BLUE.  Which Lobe (Bronch Only):->RML  Collected By:[id number] CHUCK FERNANDEZ    URINE CULTURE(NEW) [[id number]] Collected:  02/16/20 0425    Order Status:  Completed Specimen:  Urine Updated:  02/18/20 0728     Significant Indicator NEG     Source UR     Site -     Culture Result Mixed skin anitha <10,000 cfu/mL    Narrative:       Indication for culture:->Septic Shock: Persistent  hypotension,  Lactic acid > 4, vasopressors/inotropes started  Indication for culture:->Septic Shock: Persistent    Pneumocystis DFA [[id number]] Collected:  02/17/20 1821    Order Status:  Canceled Specimen:  Other from Bronchial Washings     CULTURE RESPIRATORY W/ GRM STN [[id number]] Collected:  02/17/20 1750    Order Status:  Canceled Specimen:  Other from Sputum     Quant Bronchial Washing [[id number]] Collected:  02/17/20 1750    Order  Status:  Canceled Specimen:  Sputum     Culture Respiratory W/ Grm Stn - BAL [[id number]] Collected:  02/17/20 1750    Order Status:  Canceled Specimen:  Sputum     CULTURE RESPIRATORY W/ GRM STN [[id number]] Collected:  02/17/20 1750    Order Status:  Canceled Specimen:  Sputum           Assessment:  Active Hospital Problems    Diagnosis   • *Acute respiratory failure with hypoxia ([facility]) [J96.01]   • Lung cancer, primary, with metastasis from lung to other site ([facility]) [C34.90]   • COPD (chronic obstructive pulmonary disease) ([facility]) [J44.9]   • Pulmonary fibrosis ([facility]) [J84.10]   • Pneumonia [J18.9]       Plan:  Acute on chronic hypoxic respiratory failure, improved overall  2/2 below  Intubated on 2/17  Extubated on 2/18  Remains on BiPAP  Status post bronchoscopy with BAL on 2/17.  Patient was noted to have friable mucosa bilateral, bloody mucoid secretions.    Pneumonia/pulmonary opacities noted on imaging  Pneumocystis pneumonia  Patient was on high-dose steroids prior to admission so she is certainly at risk for opportunistic infections  PCP PCR from BAL - positive  Status post BAL on 2/17.  BAL cultures-negative to date  Serum galactomannan - negative  Cocci serology- negative  Fungitell - positive  Histo - negative  Continue Bactrim   Plan for 3 week course total with stop date of 3/13/2020 followed by PCP prophylaxis if patient remains on high dose steroids post treatment  On steroids  May be able to change to oral soon  DC'd oral linezolid, and IV cefepime on 2/21  Respiratory viral panel - negative    Leukocytosis, persistent. Improving overall  Multifactorial (infection, high-dose steroids)  On antibiotics above  Monitor    Metastatic squamous cell lung cancer   Status post chemoradiation  Previously on immunotherapy    Pulmonary fibrosis  Patient was seen by pulmonologist, Dr. Longo prior to admission  Patient was on high-dose steroid taper prior to admission, started by oncology  On  Solu-Medrol    COPD/chronic hypoxia  On 3LNC baseline     Prognosis  Guarded  I have performed a physical exam and reviewed and updated ROS and plan today 2/24/2020.  In review of yesterday's note 2/23/2020, there are no changes except as documented above.    Discussed with intensivist, Dr. Valenzuela

## 2020-02-24 NOTE — PROGRESS NOTES
Critical Care Progress Note    Date of admission  2/15/2020    Chief Complaint  65 yo female, former smoker 35pack year quit in 2012 with hx of squamous cell lung CA (dx in 9/2019, in [facility] mets to lymph nodes s/p chemoXRT (last 2 months ago), recently received immunotherapy (durvalumab IV) 3 weeks prior to admission), COPD (on 3L home O2), presented on 2/15 for worsening SOB x2-3 days. Pt was seen by pulmonary Dr. 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 Immunotherapy was held. She was started on high dose prednisone 80mg then taper in the past 3 weeks by Dr Callahan.      At admission, influenza A/B PCR is negative. pt was on oxymask 5L, sat >90% and progressively increasing to 8L oxymask. Afebrile, SBP in 100-110s. WBC 17K -12K. Creatinine 0.5, HCO3 26. Lactate 1.5. Procalcitonin 0.12. CT chest today showed increased extensive multifocal airspace ground glass and interstitial opacities. No PE. Started on solumedrol 125 Q6H. Pt initially started on unasyn, then changed to linezolid and cefepime. Pt was transferred to ICU for further evaluation Taken from Dr Christopher kan.     Hospital Course  Intubated [facility] 2/17 pulse dose steroids started   Extubated 2/18 to bipap and HFNC  2/19 still needing significant bipap PCP neg bactrim stopped  2/20 changed code status, still needing bipap and HFNC mild/minimal improvement in CXR  2/21 PJP +, restart bactrim, prednisone still needing bipap/HFNC with max setting offer if tires we can go on ventilator  2/22 spent most the day on bipap still spirits are high   2/23     Interval Problem Update  Reviewed last 24 hour events:  T-max 99.5  -600 cc over last 24 hours, -6.3 L since admit  No CXR this a.m.      BAL 2/17+ for PCP  BC X 2/15 NGTD    Bactrim 2/16-P  Zyvox 2/16-21  Cefepime 2/16-21  Unasyn 2/15-16    Lasix 20 twice daily  Solu-Medrol 40 mg every 12    BiPAP 12/8 and 80%  Last BM 2/21      Review of Systems  Review  of Systems   Constitutional: Positive for malaise/fatigue. Negative for chills and fever.   HENT: Negative for congestion.    Eyes: Negative for blurred vision.   Respiratory: Positive for cough and shortness of breath. Negative for sputum production.    Cardiovascular: Negative for chest pain and palpitations.   Gastrointestinal: Negative for nausea and vomiting.   Musculoskeletal: Negative for myalgias.   Neurological: Positive for weakness. Negative for focal weakness.   Psychiatric/Behavioral: Negative for depression.   All other systems reviewed and are negative.       Vital Signs for last 24 hours   Temp:  [37.1 °C (98.8 °F)-37.3 °C (99.1 °F)] 37.1 °C (98.8 °F)  Pulse:  [76-99] 85  Resp:  [15-28] 23  BP: (124-156)/(69-83) 138/76  SpO2:  [89 %-97 %] 92 %    Hemodynamic parameters for last 24 hours       Respiratory Information for the last 24 hours       Physical Exam   Physical Exam  Vitals signs and nursing note reviewed.   Constitutional:       General: She is in acute distress.      Appearance: She is ill-appearing.   HENT:      Head: Normocephalic and atraumatic.   Eyes:      Conjunctiva/sclera: Conjunctivae normal.      Pupils: Pupils are equal, round, and reactive to light.   Neck:      Musculoskeletal: Neck supple.   Cardiovascular:      Rate and Rhythm: Normal rate.      Pulses: Normal pulses.   Pulmonary:      Breath sounds: Rales present. No wheezing.      Comments: Diminished  Abdominal:      General: There is no distension.      Palpations: Abdomen is soft.      Tenderness: There is no abdominal tenderness.   Musculoskeletal:         General: No swelling.   Skin:     General: Skin is warm and dry.   Neurological:      Mental Status: She is alert.      Cranial Nerves: No cranial nerve deficit.   Psychiatric:         Mood and Affect: Mood normal.         Medications  Current Facility-Administered Medications   Medication Dose Route Frequency Provider Last Rate Last Dose   • SODIUM CHLORIDE 0.9 % IV  SOLN            • furosemide (LASIX) injection 20 mg  20 mg Intravenous BID DIURETIC Kenton Tovar M.D.   20 mg at 02/24/20 0528   • sulfamethoxazole-trimethoprim (SEPTRA) 336 mg of trimethoprim in D5W 500 mL IVPB  15 mg/kg/day of trimethoprim Intravenous Q6HRS Kenton Tovar M.D.   Stopped at 02/24/20 0351   • methylPREDNISolone (SOLU-MEDROL) 40 MG injection 40 mg  40 mg Intravenous Q12HRS Kenton Tovar M.D.   40 mg at 02/24/20 0528    Followed by   • [START ON 2/28/2020] predniSONE (DELTASONE) tablet 40 mg  40 mg Oral DAILY Kenton Tovar M.D.        Followed by   • [START ON 3/6/2020] predniSONE (DELTASONE) tablet 20 mg  20 mg Oral DAILY Kenton Tovar M.D.       • famotidine (PEPCID) tablet 20 mg  20 mg Oral BID Kenton Tovar M.D.   20 mg at 02/23/20 1714   • calcium carbonate (TUMS) chewable tab 500 mg  500 mg Oral DAILY Kenton Tovar M.D.   500 mg at 02/23/20 0550   • vitamin D (cholecalciferol) tablet 2,000 Units  2,000 Units Oral DAILY Kenton Tovar M.D.   2,000 Units at 02/23/20 0550   • promethazine (PHENERGAN) tablet 12.5-25 mg  12.5-25 mg Oral Q4HRS PRN Kenton Tovar M.D.       • oxyCODONE immediate release (ROXICODONE) tablet 10 mg  10 mg Oral Q3HRS PRN Kenton Tovar M.D.        And   • Pharmacy Consult Request ...Pain Management Review 1 Each  1 Each Other PHARMACY TO DOSE Kenton Tovar M.D.        And   • oxyCODONE immediate-release (ROXICODONE) tablet 5 mg  5 mg Oral Q3HRS PRN Kenton Tovar M.D.   5 mg at 02/22/20 2011    And   • morphine (pf) 4 MG/ML injection 4 mg  4 mg Intravenous Q3HRS PRN Kenton Tovar M.D.       • ondansetron (ZOFRAN ODT) dispertab 4 mg  4 mg Oral Q4HRS PRN Kenton Tovar M.D.   Stopped at 02/20/20 0924   • magnesium hydroxide (MILK OF MAGNESIA) suspension 30 mL  30 mL Oral QDAY PRN Kenton Tovar M.D.        And   • senna-docusate (PERICOLACE or SENOKOT S) 8.6-50 MG per tablet 2 Tab  2 Tab Oral BID Kenton Tovar M.D.    2 Tab at 02/23/20 1714    And   • polyethylene glycol/lytes (MIRALAX) PACKET 1 Packet  1 Packet Oral QDAY PRN Kenton Tovar M.D.   1 Packet at 02/20/20 1725    And   • bisacodyl (DULCOLAX) suppository 10 mg  10 mg Rectal QDAY PRN Kenton Tovar M.D.   10 mg at 02/21/20 1056   • hydrALAZINE (APRESOLINE) tablet 10 mg  10 mg Oral Q8HRS PRN Kenton Tovar M.D.       • guaiFENesin dextromethorphan (ROBITUSSIN DM) 100-10 MG/5ML syrup 10 mL  10 mL Oral Q6HRS PRN Kenton Tovar M.D.       • acetaminophen (TYLENOL) tablet 650 mg  650 mg Oral Q6HRS PRN Kenton Tovar M.D.       • ascorbic acid tablet 1,000 mg  1,000 mg Oral DAILY Kenton Tovar M.D.   1,000 mg at 02/23/20 0550   • MD Alert...ICU Electrolyte Replacement per Pharmacy   Other PHARMACY TO DOSE Kenton Tovar M.D.       • lidocaine (XYLOCAINE) 1 % injection 1-2 mL  1-2 mL Tracheal Tube Q30 MIN PRN Kenton Tovar M.D.       • labetalol (NORMODYNE/TRANDATE) injection 10 mg  10 mg Intravenous Q4HRS PRN Kenton Tovar M.D.   10 mg at 02/17/20 1805   • hydrALAZINE (APRESOLINE) injection 20 mg  20 mg Intravenous Q4HRS PRN Lazaro Benites M.D.   20 mg at 02/17/20 2023   • enoxaparin (LOVENOX) inj 40 mg  40 mg Subcutaneous DAILY Dino Sykes M.D.   40 mg at 02/24/20 0527   • ondansetron (ZOFRAN) syringe/vial injection 4 mg  4 mg Intravenous Q4HRS PRN Dino Sykes M.D.   4 mg at 02/20/20 1004   • promethazine (PHENERGAN) suppository 12.5-25 mg  12.5-25 mg Rectal Q4HRS PRN Dino Sykes M.D.       • prochlorperazine (COMPAZINE) injection 5-10 mg  5-10 mg Intravenous Q4HRS PRN Dino Sykes M.D.       • Respiratory Therapy Consult   Nebulization Continuous RT Dino Sykes M.D.       • ipratropium-albuterol (DUONEB) nebulizer solution  3 mL Nebulization Q4HRS (RT) Dino Sykes M.D.   3 mL at 02/24/20 0630   • ipratropium-albuterol (DUONEB) nebulizer solution  3 mL Nebulization Q2HRS PRN (RT) Dino Sykes M.D.            Fluids    Intake/Output Summary (Last 24 hours) at 2/24/2020 0708  Last data filed at 2/24/2020 0600  Gross per 24 hour   Intake 2346.45 ml   Output 2950 ml   Net -603.55 ml       Laboratory          Recent Labs     02/22/20  0500 02/23/20  0440 02/24/20  0224   SODIUM 134* 135 132*   POTASSIUM 3.9 4.6 4.6   CHLORIDE 95* 94* 91*   CO2 30 29 28   BUN 19 19 19   CREATININE 0.57 0.59 0.59   MAGNESIUM 2.1 2.0  --    PHOSPHORUS 3.1 3.3 4.0   CALCIUM 8.7 8.7 9.2     Recent Labs     02/22/20  0500 02/23/20  0440 02/24/20  0224   GLUCOSE 151* 96 96     Recent Labs     02/22/20  0500 02/23/20  0440 02/24/20  0224   WBC 16.7* 19.7* 22.1*     Recent Labs     02/22/20  0500 02/22/20  1545 02/23/20 0440 02/24/20 0224   RBC 3.50*  --  3.85* 4.35   HEMOGLOBIN 10.8*  --  11.9* 13.0   HEMATOCRIT 32.6*  --  35.3* 39.7   PLATELETCT 192  --  227 255   PROTHROMBTM  --  13.7  --   --    APTT  --  25.1  --   --    INR  --  1.03  --   --        Imaging  X-Ray:  I have personally reviewed the images and compared with prior images.    Assessment/Plan  * Acute respiratory failure with hypoxia (HCC)  Assessment & Plan  Intubated 2/17 for diagnostic workup -> extubated 2/18    Discussed with Dr Cate hernandez 2/17, Dr Longo 2/18  Progression of diffuse airspace opacities with chronic fibrotic changes in the background of emphysema   Found to have PJP pneumonia  Linezolid, Cefepimine d/c 2/21    Continue BiPAP -low threshold for intubation as nearing max settings  Patient would not want trach or chronic ventilator dependency   Solumedrol 1gr IV x3 days 2/17-2/19 -> changed to PJP prednisone equivalent   Continue current regimen      Lung cancer, primary, with metastasis from lung to other site (HCC)- (present on admission)  Assessment & Plan  Hx of squamous cell lung CA (dx in 9/2019, in [facility] mets to lymph nodes   s/p chemoXRT (last 2 months ago)  recently received immunotherapy (durvalumab IV) 3 weeks prior to  admission      Pneumocystis jiroveci pneumonia (HCC)  Assessment & Plan  Continue IV Bactrim monitor volume (2l per day) and diuretics to keep even and renal toxicity  Steroids for severe PCP pna  Prednisone 40mg BID x5 days  Prednisone 40mg QD x5 days  Prednisone 20mg QD x11 days  Or IV solumedrol equivalent since her stomach is upset continue pepcid tums for steroid induced gastritis    Leukocytosis  Assessment & Plan  Serial monitor steroid vs infectious       Pneumonia  Assessment & Plan  Currently treating broadly  Follow on up BAL RML, LLL, PJP DFA, silver stain, AFB, legionella and strep antigen, viral panel sent 2/20, Cocci, fungal, flu negative  ID following  Bactrim d/c 2/19, restarted 2/21 with + PJP, continue Linezolid, Cefepime    Found to have PJP pneumonia  Continue IV bactrim and steroids    Pulmonary fibrosis (HCC)  Assessment & Plan  Seen on CT 1/14/2020 and 2/15/202    Pulmonary HTN (HCC)- (present on admission)  Assessment & Plan  Prior echo with RVSP 45 related to chronic lung disease  Dry fluid volume as tolerated    Macrocytic anemia- (present on admission)  Assessment & Plan  Serial monitor hg restrictive transfusion hg < 7    COPD (chronic obstructive pulmonary disease) (HCC)- (present on admission)  Assessment & Plan  Background COPD with 2-3L home O2       VTE:  Lovenox  Ulcer: H2 Antagonist  Lines: Whyte Catheter  Ongoing indication addressed    I have performed a physical exam and reviewed and updated ROS and Plan today (2/24/2020). In review of yesterday's note (2/23/2020), there are no changes except as documented above.     Discussed patient condition and risk of morbidity and/or mortality with RN, RT, Pharmacy, Charge nurse / hot rounds, Patient and infectious disease     The patient remains critically ill.  Critical care time = 34 minutes in directly providing and coordinating critical care and extensive data review.  No time overlap and excludes procedures.

## 2020-02-24 NOTE — DISCHARGE PLANNING
Care Transition Team Assessment  [place] dtr [place] @ [phone]. She runs errands and assists when needed.  Pt's support system is large including friends and neighbors. The only biological [place] is [place].    Pt lives in [facility] apt and is independent w/ I/ADLs. Pt has her own car. Pt has O2 from [facility] and there is a tank in her truck which is in  parking at hospital.    [facility] provided contact number for [employer] so dtr can f/u w/ any possible services to assist pt w/ moving to a 1st floor apt. Dtr will research local grocery delivery services as pt is eating less since she does not want to bother anyone. [employer] does offers this service, but not for one carton of milk, etc.     Information Source  Orientation : Oriented x 4  Information Given By: (dtr [place])  Who is responsible for making decisions for patient? : Patient    Readmission Evaluation  Is this a readmission?: No    Elopement Risk  Legal Hold: No  Ambulatory or Self Mobile in Wheelchair: No-Not an Elopement Risk  Disoriented: No  Psychiatric Symptoms: None  History of Wandering: No  Elopement this Admit: No  Vocalizing Wanting to Leave: No  Displays Behaviors, Body Language Wanting to Leave: No-Not at Risk for Elopement  Elopement Risk: Not at Risk for Elopement    Interdisciplinary Discharge Planning  Primary Care Physician: Yasmani Gruber(pt last saw PCP in January 2020)  Lives with - Patient's Self Care Capacity: Alone and Able to Care For Self  Patient or legal guardian wants to designate a caregiver (see row info): No  Support Systems: (dtr in [place], and several friends and [employer] supports)  Housing / Facility: (2nd floor studio apt)  Do You Take your Prescribed Medications Regularly: Yes  Mobility Issues: No  Prior Services: (, [employer] voucher)  Durable Medical Equipment: Home Oxygen  DME Provider / Phone: [facility]    Discharge Preparedness  Prior Functional Level: Ambulatory, Drives Self, Independent with Activities of Daily Living, Independent with  Medication Management    Functional Assesment  Prior Functional Level: Ambulatory, Drives Self, Independent with Activities of Daily Living, Independent with Medication Management    Finances  Prescription Coverage: Yes(dtr p/u scripts at [facility] in [place])    Vision / Hearing Impairment  Vision Impairment : Yes  Right Eye Vision: Impaired, Wears Glasses  Left Eye Vision: Impaired, Wears Glasses  Hearing Impairment : No              Domestic Abuse  Have you ever been the victim of abuse or violence?: No  Physical Abuse or Sexual Abuse: No  Verbal Abuse or Emotional Abuse: No  Possible Abuse Reported to:: Not Applicable    Psychological Assessment  History of Substance Abuse: None  History of Psychiatric Problems: No(may have emotions due to recent dxes-health concerns)  Newly Diagnosed Illness: (cancer and COPD)    Discharge Risks or Barriers  Patient risk factors: Complex medical needs    Anticipated Discharge Information  Anticipated discharge disposition: Discharge needs currently unknown  Discharge Address: address on face sheet is dtr's and mailing address(pt's physical add: [id number] [place]  [place] [facility])

## 2020-02-25 NOTE — PROGRESS NOTES
0545-- patient started complaining of Nausea, and that she was about to vomit. This RN removed bipap, placed pt on HIFLOW and gave pt emesis bag. Pt started to desat into the 60s, gave pt anti-nausea medication see MAR. RT placed pt back on BIPAP. Chest xray ordered due to pt being in low 70s for over 5 minutes. Pt returned to 90s after approximately 5 minutes of being back on BIPAP.

## 2020-02-25 NOTE — DIETARY
"Nutrition Support Assessment   Day 10 of admit.  Michelle Billingsley is a 64 y.o. female with admitting DX of Respiratory failure (HCC), Pneumonia     Current problem list:  1. Requiring BiPAP; her respiratory status remains tenuous  2. PNA  3. Metastatic lung cancer  4. COPD     Assessment:  Estimated Nutritional Needs: based on:   Height: 172.7 cm (5' 8\")  Weight: 86.3 kg (190 lb 4.1 oz)  Ideal Body Weight: 63.5 kg (140 lb)  Percent Ideal Body Weight: 135.9  Body mass index is 28.93 kg/m²., BMI classification: Overweight    Calculation/Equation: REE per MSJ x1.1-1.2 = 8051-1814 kcal/day  Total Calories / day: 1600 - 1800 (Calories / k.5 - 21)  Total Grams Protein / day: 104 - 129 (Grams Protein / k.2 - 1.5)     Evaluation:   1. Pt unable to take PO diet secondary to respiratory requirements.  2. Gastric tube placed and TF started per protocol.  3. Weight down 4.4 kg in 9 days (5%). -7.8 L fluid per I/O. Diuresis continues.  4. Labs and meds reviewed.   5. Last BM . Bowel meds per MAR.  6. Low-volume, carbohydrate-controlled, high-protein TF formula is indicated.    Malnutrition Risk: Inadequate nutritional intake during admission.      Recommendations/Plan:  1. Start Impact Peptide 1.5 @ 25 ml/hr and advance per protocol to goal rate 50 ml/hr to provide 1800 kcal, 113 grams protein, and 924 ml free water per day.  2. Fluids per MD.  3. PO diet when safe and appropriate.    RD following.   "

## 2020-02-25 NOTE — DISCHARGE PLANNING
Care Transition Team Discharge Planning    Anticipated Discharge Disposition: TBD    Action: Lsw spoke w/ pt and her dtr at bedside. They are concerned about pt returning to her second story apt.    Lsw offered to call pt's apt manager to request a 1st floor apt at d/c. Pt states if she will pass in 2 months, she does not want to take up everyones time w/ moving.    Dtr suggests pt d/c to shelter, , SNF.    Lsw provided list of local SNFs and ALFs. The concern is pt was not eligible for Medicaid last fall as she was over $80.00. Lsw explained LTC bed in SNF may be possibility as institutional Medicaid is different then community.    Pt is stating she will not have any more treatmenst for either chemo or radiation.      Barriers to Discharge: TBD    Plan: f/u w/ medical team, pt/dtr

## 2020-02-25 NOTE — PROGRESS NOTES
Infectious Disease Progress Note    Author: Tana Calhoun M.D. Date & Time of service: 2/25/2020  10:40 AM    Chief Complaint:  Follow-up for acute on chronic hypoxic respiratory failure, pulmonary infiltrates    Interval History:  2/18 afebrile WBC 20.9 patient intubated yesterday secondary to worsening respiratory distress.  Multiple cultures obtained and are currently pending.  Patient is awake on the vent and communicating by writing.  She is breathing comfortably with plans for possible extubation today.  2/19 afebrile WBC 20.4 patient extubated yesterday.  BAL cultures negative to date.  Beta D glucan positive.  Currently on BiPAP.  Reports minimal cough  2/20 afebrile, WBC 18.1 BAL path- no malignancy.  GMS stain negative for fungal organisms.  Patient feels slightly better today.  She however desats off of BiPAP  2/21 afebrile WBC 16 patient now on high flow nasal cannula and breathing better.  Multiple PCP PCR is negative from bronchoscopy specimen.  We will resume Bactrim today.  Respiratory viral panel negative  2/22 T-max 99.9 WBC 16.7.  Patient is on BiPAP.  She states that her breathing is comfortable and she is not feeling as tired.  2/23 afebrile WBC 19.7.  Patient has remained on BiPAP overnight.  She feels about the same.  No new symptoms to report  2/24/2020-no fevers.  The WBC count  22,000.  Creatinine is 0.59 continues to remain short of breath.  2/25/2020-no fevers.  Continues to remain on BiPAP.  Continues to remain short of breath.  WBC is 23.6.  CRP is 2.27.  Labs Reviewed, Medications Reviewed and Radiology Reviewed.    Review of Systems:  Review of Systems   Constitutional: Negative for chills and fever.   Respiratory: Positive for cough and shortness of breath.         Persistent   Cardiovascular: Negative for chest pain.   Gastrointestinal: Negative for abdominal pain, diarrhea, nausea and vomiting.   Neurological: Negative for dizziness and headaches.   All other systems reviewed  and are negative.       Hemodynamics:  No data recorded.  Monitored Temp: 37.2 °C (99 °F)  Pulse  Av.7  Min: 58  Max: 132   Blood Pressure: 141/74       Physical Exam:  Physical Exam  Constitutional:       Appearance: Normal appearance.   HENT:      Head:      Comments: BiPAP     Nose:      Comments: cortrak     Mouth/Throat:      Mouth: Mucous membranes are moist.   Eyes:      Extraocular Movements: Extraocular movements intact.      Conjunctiva/sclera: Conjunctivae normal.      Pupils: Pupils are equal, round, and reactive to light.   Neck:      Musculoskeletal: Normal range of motion and neck supple.   Cardiovascular:      Rate and Rhythm: Regular rhythm. Tachycardia present.      Comments: Right upper chest port in place-nontender, no surrounding erythema  Pulmonary:      Effort: Pulmonary effort is normal.      Breath sounds: Rales present.   Abdominal:      Palpations: Abdomen is soft.      Tenderness: There is no abdominal tenderness.   Musculoskeletal: Normal range of motion.      Right lower leg: Edema present.      Left lower leg: Edema present.   Skin:     General: Skin is warm and dry.   Neurological:      General: No focal deficit present.      Mental Status: She is alert and oriented to person, place, and time.      Cranial Nerves: No cranial nerve deficit.   Psychiatric:         Mood and Affect: Mood normal.         Behavior: Behavior normal.      Comments: Very pleasant         Meds:    Current Facility-Administered Medications:   •  [START ON 2020] vitamin D  •  promethazine  •  methylPREDNISolone **FOLLOWED BY** [START ON 2020] predniSONE **FOLLOWED BY** [START ON 3/6/2020] predniSONE  •  Notify provider if pain remains uncontrolled **AND** Use the numeric rating scale (NRS-11) on regular floors and Critical-Care Pain Observation Tool (CPOT) on ICUs/Trauma to assess pain **AND** Pulse Ox (Oximetry) **AND** Pharmacy Consult Request **AND** If patient difficult to arouse and/or has  respiratory depression, stop any opiates that are currently infusing and call a Rapid Response. **AND** oxyCODONE immediate-release **AND** oxyCODONE immediate-release **AND** morphine injection  •  ondansetron  •  senna-docusate **AND** polyethylene glycol/lytes **AND** magnesium hydroxide **AND** bisacodyl  •  hydrALAZINE  •  guaiFENesin dextromethorphan  •  famotidine  •  [START ON 2/26/2020] calcium carbonate  •  [START ON 2/26/2020] ascorbic acid  •  acetaminophen  •  epoprostenol (FLOLAN) 1.5 mg syringe (For INHALATION)  •  haloperidol lactate  •  furosemide  •  sulfamethoxazole-trimethoprim (Septra) IV  •  MD Alert...Adult ICU Electrolyte Replacement per Pharmacy  •  lidocaine  •  labetalol  •  hydrALAZINE  •  enoxaparin  •  ondansetron  •  promethazine  •  prochlorperazine  •  Respiratory Therapy Consult  •  ipratropium-albuterol  •  ipratropium-albuterol    Labs:  Recent Labs     02/23/20 0440 02/24/20 0224 02/25/20  0130   WBC 19.7* 22.1* 23.6*   RBC 3.85* 4.35 4.40   HEMOGLOBIN 11.9* 13.0 13.3   HEMATOCRIT 35.3* 39.7 40.3   MCV 91.7 91.3 91.6   MCH 30.9 29.9 30.2   RDW 51.9* 52.0* 52.0*   PLATELETCT 227 255 237   MPV 9.3 9.2 8.9*     Recent Labs     02/23/20 0440 02/24/20 0224 02/25/20  0130   SODIUM 135 132* 129*   POTASSIUM 4.6 4.6 4.5   CHLORIDE 94* 91* 86*   CO2 29 28 32   GLUCOSE 96 96 86   BUN 19 19 23*     Recent Labs     02/23/20 0440 02/24/20 0224 02/25/20  0130   ALBUMIN 3.6 3.9 4.0   CREATININE 0.59 0.59 0.68       Imaging:  Dx-chest-portable (1 View)    Result Date: 2/18/2020 2/18/2020 3:10 AM HISTORY/REASON FOR EXAM: For indication of respiratory failure; For indication of respiratory failure TECHNIQUE/EXAM DESCRIPTION:  Single AP view of the chest. COMPARISON: Yesterday FINDINGS: Dobbhoff tube has been placed in the interim, terminating below the lower margin of the film within the abdomen.  Otherwise medical device position is stable. The cardiac silhouette appears within normal  limits. The mediastinal contour appears within normal limits.  The central  pulmonary vasculature appears prominent and indistinct. The lungs appear well expanded bilaterally.  Hazy interstitial bilateral pulmonary opacities are seen. No significant pleural effusions are identified. The bony structures appear age-appropriate.     1.  Pulmonary edema and/or infiltrates are identified, which are stable since the prior exam.    Dx-chest-portable (1 View)    Result Date: 2/17/2020 2/17/2020 6:02 PM HISTORY/REASON FOR EXAM:  POST INTUBATION. TECHNIQUE/EXAM DESCRIPTION AND NUMBER OF VIEWS: Single portable view of the chest. COMPARISON: 2/15/2020 FINDINGS: Endotracheal tube projects at the level of the clavicular heads. Right chest port projects over the SVC. The cardiomediastinal silhouette is stable. Interstitial and alveolar airspace opacities are again noted and increased on the right compared to prior. No definite pleural effusion or pneumothorax is identified.     Extensive interstitial and alveolar airspace opacities are increased on the right compared to prior. Findings may represent edema or multifocal pneumonia. Underlying mass is not excluded. Underlying emphysematous changes.    Dx-chest-portable (1 View)    Result Date: 2/15/2020  2/15/2020 11:50 AM HISTORY/REASON FOR EXAM: possible sepsis.. Shortness of breath. Lung carcinoma. TECHNIQUE/EXAM DESCRIPTION AND NUMBER OF VIEWS: Single AP view of the chest. COMPARISON: 11/15/2019 FINDINGS: Hardware: Right IJ portacatheter tip overlies the brachiocephalic confluence Lungs: Significant interval worsening consolidated opacity in the left lung with some volume loss noted. There is also worsening diffuse, bilateral reticular opacification Pleura:  Possible layering left pleural fluid Heart and mediastinum: Left heart border is no longer visualized, completely effaced. No gross cardiac silhouette enlargement     Significant interval worsening left perihilar/lingular  consolidation with possible underlying Mass. Significant worsening diffuse reticular abnormal opacity is consistent with underlying interstitial lung disease. Superimposed atypical infection or edema is possible New left lung volume loss    Ir-cvc Port Placement > Age 5    Result Date: 1/30/2020 1/30/2020 9:34 AM HISTORY/REASON FOR EXAM:  Patient requires central venous access for chemotherapy. TECHNIQUE/EXAM DESCRIPTION: Following informed consent patient was prepped and draped in the usual fashion.  A total of 14 cc of 1% lidocaine with epinephrine was utilized for local and subcutaneous anesthesia. SEDATION: 4 mg of Versed and 100 mcg of fentanyl were utilized for IV conscious sedation. The procedure was monitored continuously by the sedation nurse. 30 minutes of sedation time were utilized for the procedure. Fluoroscopy images: 3 fluoroscopic images obtained. Fluoroscopy time: 0.6 minutes The procedure was performed with MAXIMUM STERILE BARRIER TECHNIQUE including sterile gown, mask, cap, and done in a sterile gloves following appropriate hand hygiene and/or sterile scrub. The patient's skin site was prepped with 2% chlorhexidine solution. Ultrasound evaluation of the right internal jugular vein demonstrated patency and an image was archived in the PACS system. Utilizing ultrasonic and fluoroscopic guidance right internal jugular vein was accessed and an 8-Nauruan sheath dilator was positioned in the right internal jugular vein.  Next utilizing a combination of blunt and sharp dissection the port pocket was created in the right infraclavicular fossa.  The port was subsequently attached to the catheter and utilizing a tunneling device a subcutaneous tract was created to the neck puncture site.  The catheter was pulled through the tunnel and trimmed to length.  Next the catheter was advanced through the peel-away sheath which was subsequently removed. The port pocket was closed with 4 deep 2-0 Vicryl sutures, the  skin was closed Dermabond.  The  neck puncture site was closed with Dermabond and fluoroscopic images were obtained.  The patient tolerated procedure well. COMPARISON:  None FINDINGS: Fluoroscopic images revealed good positioning of the port in the right infraclavicular fossa.  There is a smooth arc of the catheter into the right internal jugular vein.  The distal tip of the catheter is at the caval atrial junction.     Successful percutaneous placement of Port-A-Cath via right internal jugular approach utilizing ultrasonic and fluoroscopic guidance.    Ct-cta Chest Pulmonary Artery W/ Recons    Result Date: 2/15/2020  2/15/2020 10:22 PM HISTORY/REASON FOR EXAM:  Shortness of breath TECHNIQUE/EXAM DESCRIPTION: CT angiogram scan for pulmonary embolism with contrast, with reconstructions. 1.25 mm helical sections were obtained from the lung apices through the lung bases following the rapid bolus administration of 55 mL of Omnipaque 350 nonionic contrast. Thin-section overlapping reconstruction interval was utilized. Coronal reconstructions were generated from the axial data. MIP post processing was performed and utilized for the interpretation. Low dose optimization technique was utilized for this CT exam including automated exposure control and adjustment of the mA and/or kV according to patient size. COMPARISON: 1/14/2020 FINDINGS: Pulmonary Embolism: No. Main Pulmonary Arteries: No. Segmental branches: No. Subsegmental branches: No. Additional Comments: None. Lungs: There are increased extensive multifocal airspace groundglass and interstitial opacities. There is underlying emphysema and interstitial lung disease. There is a redemonstrated stellate area of masslike scarring in the left lower lobe measuring 2.6 x 3.1 cm, essentially unchanged from previous exam. Pleura: No pleural effusion. Nodes: No enlarged lymph nodes. Additional findings: There is a small to moderate sized hiatal hernia. There is diffuse  "low-attenuation of the hepatic parenchyma consistent with steatosis.     1.  No evidence of pulmonary embolism. 2.  Increased extensive multifocal airspace groundglass and interstitial opacities. Findings could be consistent with edema and/or infection. 3.  Underlying emphysema and interstitial lung disease. 4.  Redemonstrated stellate area of masslike scarring in the left lower lobe measuring 2.6 x 3.1 cm, essentially unchanged from the previous exam and consistent with known malignancy. 5.  Small to moderate sized hiatal hernia. 6.  Hepatic steatosis.     Dx-abdomen For Tube Placement    Result Date: 2/17/2020 2/17/2020 8:42 PM HISTORY/REASON FOR EXAM: cortrak placement TECHNIQUE/EXAM DESCRIPTION:  Single AP view the abdomen. COMPARISON:  None FINDINGS: Hazy bilateral lower lobe opacities are seen. Dobbhoff tube is seen, the tip lies to the right of the lumbar spine.  The bowel gas pattern appears nonspecific. The bony structures appear age-appropriate.     1.  Nonspecific bowel gas pattern. 2.  Dobbhoff tube tip terminates overlying the expected location of the gastric antrum. 3.  Hazy bilateral lung base infiltrates or edema.      Micro:  Results     Procedure Component Value Units Date/Time    BLOOD CULTURE [[id number]] Collected:  02/15/20 1155    Order Status:  Completed Specimen:  Blood from Peripheral Updated:  02/20/20 1500     Significant Indicator NEG     Source BLD     Site PERIPHERAL     Culture Result No growth after 5 days of incubation.    Narrative:       Per Hospital Policy: Only change Specimen Src: to \"Line\" if  specified by physician order.  Right Wrist    BLOOD CULTURE [[id number]] Collected:  02/15/20 1245    Order Status:  Completed Specimen:  Blood from Peripheral Updated:  02/20/20 1500     Significant Indicator NEG     Source BLD     Site PERIPHERAL     Culture Result No growth after 5 days of incubation.    Narrative:       Per Hospital Policy: Only change Specimen Src: to \"Line\" " if  specified by physician order.  Right AC    CULTURE RESPIRATORY W/ GRM STN [[id number]] Collected:  02/17/20 1735    Order Status:  Completed Specimen:  Respirate from Sputum Updated:  02/19/20 0927     Significant Indicator NEG     Source RESP     Site Bronchoalveolar Lavage RML     Culture Result No growth at 48 hours.     Gram Stain Result Rare WBCs.  No organisms seen.      Narrative:       Collected By:[id number] BLUE.  Which Lobe (Bronch Only):->RML  Collected By:[id number] FERNANDEZ    AFB Culture - BAL [[id number]] Collected:  02/17/20 1750    Order Status:  Completed Specimen:  Respirate from Sputum Updated:  02/19/20 0927     Significant Indicator NEG     Source RESP     Site Quantitative BAL LLL     Culture Result Culture in progress.     AFB Smear Results No acid fast bacilli seen.    Narrative:       Collected By:[id number] BLUE.  Which Lobe (Bronch Only):->LLL  Collected By:[id number] FERNANDEZ    Fungal Culture - BAL [[id number]] Collected:  02/17/20 1750    Order Status:  Completed Specimen:  Respirate from Sputum Updated:  02/19/20 0927     Significant Indicator NEG     Source RESP     Site Quantitative BAL LLL     Culture Result No fungal growth to date.    Narrative:       Collected By:[id number] FERNANDEZ  Which Lobe (Bronch Only):->LLL  Collected By:[id number] BLUE.    Fungal Smear - BAL [[id number]] Collected:  02/17/20 1750    Order Status:  Completed Specimen:  Respirate from Sputum Updated:  02/19/20 0927     Significant Indicator NEG     Source RESP     Site Quantitative BAL LLL     Fungal Smear Results No fungal elements seen.    Narrative:       Collected By:[id number] BLUE.  Which Lobe (Bronch Only):->LLL  Collected By:[id number] BLUE.    QUANT BRONCHIAL WASHING [[id number]] Collected:  02/17/20 1750    Order Status:  Completed Specimen:  Respirate Updated:  02/19/20 0927     Significant Indicator NEG     Source RESP     Site Quantitative BAL LLL      Culture Result No growth at 48 hours.     Gram Stain Result Rare WBCs.  No organisms seen.      Narrative:       Collected By:[id number] CHUCK BLUE.  Which Lobe (Bronch Only):->LLL  Collected By:[id number] CHUCK BLUE.    Acid Fast Stain [[id number]] Collected:  02/17/20 1750    Order Status:  Completed Specimen:  Respirate Updated:  02/18/20 1622     Significant Indicator NEG     Source RESP     Site Quantitative BAL LLL     AFB Smear Results No acid fast bacilli seen.    Narrative:       Collected By:[id number] CHUCK BLUE.  Which Lobe (Bronch Only):->LLL  Collected By:[id number] CHUCK BLUE.    GRAM STAIN [[id number]] Collected:  02/17/20 1750    Order Status:  Completed Specimen:  Respirate Updated:  02/18/20 1622     Significant Indicator .     Source RESP     Site Quantitative BAL LLL     Gram Stain Result Rare WBCs.  No organisms seen.      Narrative:       Collected By:[id number] CHUCK BLUE.  Which Lobe (Bronch Only):->LLL  Collected By:[id number] CHUCK BLUE.    GRAM STAIN [[id number]] Collected:  02/17/20 1735    Order Status:  Completed Specimen:  Respirate Updated:  02/18/20 1320     Significant Indicator .     Source RESP     Site Bronchoalveolar Lavage RML     Gram Stain Result Rare WBCs.  No organisms seen.      Narrative:       Collected By:[id number] CHUCK BLUE.  Which Lobe (Bronch Only):->RML  Collected By:[id number] CHUCK BLUE.          Assessment:  Active Hospital Problems    Diagnosis   • *Acute respiratory failure with hypoxia ([facility]) [J96.01]   • Lung cancer, primary, with metastasis from lung to other site ([facility]) [C34.90]   • COPD (chronic obstructive pulmonary disease) ([facility]) [J44.9]   • Pulmonary fibrosis ([facility]) [J84.10]   • Pneumonia [J18.9]       Plan:  Acute on chronic hypoxic respiratory failure, improved overall  2/2 below  Intubated on 2/17  Extubated on 2/18  Remains on BiPAP  Status post bronchoscopy with BAL on 2/17.  Patient was noted to have friable mucosa bilateral, bloody  mucoid secretions.  Her respiratory status remains tenuous    Pneumonia/pulmonary opacities noted on imaging  Pneumocystis pneumonia  Patient was on high-dose steroids prior to admission so she is certainly at risk for opportunistic infections  PCP PCR from BAL - positive  Status post BAL on 2/17.  BAL cultures-negative to date  Serum galactomannan - negative  Cocci serology- negative  Fungitell - positive  Histo - negative  Continue Bactrim   Plan for 3 week course total with stop date of 3/13/2020 followed by PCP prophylaxis if patient remains on high dose steroids post treatment  On steroids  May be able to change to oral soon  DC'd oral linezolid, and IV cefepime on 2/21  Respiratory viral panel - negative    Leukocytosis, persistent.   Multifactorial (infection, high-dose steroids)  On antibiotics above  Monitor    Metastatic squamous cell lung cancer   Status post chemoradiation  Previously on immunotherapy    Pulmonary fibrosis  Patient was seen by pulmonologist, Dr. Longo prior to admission  Patient was on high-dose steroid taper prior to admission, started by oncology  On Solu-Medrol    COPD/chronic hypoxia  On 3LNC baseline     Prognosis  Guarded  I have performed a physical exam and reviewed and updated ROS and plan today 2/25/2020.  In review of yesterday's note 2/24/2020, there are no changes except as documented above.    Discussed with intensivist, Dr. Valenzuela

## 2020-02-25 NOTE — CARE PLAN
Problem: Nutritional:  Goal: Nutrition support tolerated and meeting greater than 85% of estimated needs  Outcome: PROGRESSING AS EXPECTED     TF started per protocol.

## 2020-02-25 NOTE — CARE PLAN
Problem: Respiratory:  Goal: Respiratory status will improve  Outcome: PROGRESSING SLOWER THAN EXPECTED  Note: Patient maxed out on Bipap O2. Was previously on 80% O2.     Problem: Psychosocial Needs:  Goal: Level of anxiety will decrease  Outcome: PROGRESSING SLOWER THAN EXPECTED     Problem: Mobility  Goal: Risk for activity intolerance will decrease  Outcome: PROGRESSING SLOWER THAN EXPECTED  Note: Patient unable to tolerate activity. She will desat to up to 60% with minor movement in bed.

## 2020-02-25 NOTE — PROGRESS NOTES
Critical Care Progress Note    Date of admission  2/15/2020    Chief Complaint  63 yo female, former smoker 35pack year quit in 2012 with hx of squamous cell lung CA (dx in 9/2019, in [facility] mets to lymph nodes s/p chemoXRT (last 2 months ago), recently received immunotherapy (durvalumab IV) 3 weeks prior to admission), COPD (on 3L home O2), presented on 2/15 for worsening SOB x2-3 days. Pt was seen by pulmonary Dr. 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 Immunotherapy was held. She was started on high dose prednisone 80mg then taper in the past 3 weeks by Dr Callahan.      At admission, influenza A/B PCR is negative. pt was on oxymask 5L, sat >90% and progressively increasing to 8L oxymask. Afebrile, SBP in 100-110s. WBC 17K -12K. Creatinine 0.5, HCO3 26. Lactate 1.5. Procalcitonin 0.12. CT chest today showed increased extensive multifocal airspace ground glass and interstitial opacities. No PE. Started on solumedrol 125 Q6H. Pt initially started on unasyn, then changed to linezolid and cefepime. Pt was transferred to ICU for further evaluation Taken from Dr Christopher kan.     Hospital Course  Intubated [facility] 2/17 pulse dose steroids started   Extubated 2/18 to bipap and HFNC  2/19 still needing significant bipap PCP neg bactrim stopped  2/20 changed code status, still needing bipap and HFNC mild/minimal improvement in CXR  2/21 PJP +, restart bactrim, prednisone still needing bipap/HFNC with max setting offer if tires we can go on ventilator  2/22 spent most the day on bipap still spirits are high   2/23     Interval Problem Update  Reviewed last 24 hour events:  T-max 99.3  -1.5L over last 24 hours, -7.8 L since admit  CXR with ongoing diffuse interstitial opacities      BAL 2/17+ for PCP  BC X 2/15 NGTD    Bactrim 2/16-P  Zyvox 2/16-21  Cefepime 2/16-21  Unasyn 2/15-16    Lasix 20 twice daily  Solu-Medrol 40 mg every 12    BiPAP 12/8 and 90%  Last BM  2/21      Review of Systems  Review of Systems   Constitutional: Positive for malaise/fatigue. Negative for chills and fever.   HENT: Negative for congestion.    Eyes: Negative for blurred vision.   Respiratory: Positive for cough and shortness of breath. Negative for sputum production.    Cardiovascular: Negative for chest pain and palpitations.   Gastrointestinal: Negative for nausea and vomiting.   Musculoskeletal: Negative for myalgias.   Neurological: Positive for weakness. Negative for focal weakness.   Psychiatric/Behavioral: Negative for depression.   All other systems reviewed and are negative.       Vital Signs for last 24 hours   Temp:  [37.1 °C (98.7 °F)] 37.1 °C (98.7 °F)  Pulse:  [] 104  Resp:  [19-29] 26  BP: (125-160)/(67-86) 142/67  SpO2:  [86 %-98 %] 91 %    Hemodynamic parameters for last 24 hours       Respiratory Information for the last 24 hours       Physical Exam   Physical Exam  Vitals signs and nursing note reviewed.   Constitutional:       General: She is in acute distress.      Appearance: She is ill-appearing.   HENT:      Head: Normocephalic and atraumatic.   Eyes:      Conjunctiva/sclera: Conjunctivae normal.      Pupils: Pupils are equal, round, and reactive to light.   Neck:      Musculoskeletal: Neck supple.   Cardiovascular:      Rate and Rhythm: Normal rate.      Pulses: Normal pulses.   Pulmonary:      Breath sounds: Rales present. No wheezing.      Comments: Diminished  Abdominal:      General: There is no distension.      Palpations: Abdomen is soft.      Tenderness: There is no abdominal tenderness.   Musculoskeletal:         General: No swelling.   Skin:     General: Skin is warm and dry.   Neurological:      Mental Status: She is alert.      Cranial Nerves: No cranial nerve deficit.   Psychiatric:         Mood and Affect: Mood normal.         Medications  Current Facility-Administered Medications   Medication Dose Route Frequency Provider Last Rate Last Dose   •  haloperidol lactate (HALDOL) injection 5 mg  5 mg Intravenous Once Kenton Valenzuela M.D.       • furosemide (LASIX) injection 20 mg  20 mg Intravenous BID DIURETIC Kenton Tovar M.D.   20 mg at 02/25/20 0559   • sulfamethoxazole-trimethoprim (SEPTRA) 336 mg of trimethoprim in D5W 500 mL IVPB  15 mg/kg/day of trimethoprim Intravenous Q6HRS Kenton Tovar M.D.   Stopped at 02/25/20 0424   • methylPREDNISolone (SOLU-MEDROL) 40 MG injection 40 mg  40 mg Intravenous Q12HRS Kenton Tovar M.D.   40 mg at 02/25/20 0559    Followed by   • [START ON 2/28/2020] predniSONE (DELTASONE) tablet 40 mg  40 mg Oral DAILY Kenton Tovar M.D.        Followed by   • [START ON 3/6/2020] predniSONE (DELTASONE) tablet 20 mg  20 mg Oral DAILY Kenton Tovar M.D.       • famotidine (PEPCID) tablet 20 mg  20 mg Oral BID Kenton Tovar M.D.   20 mg at 02/25/20 0559   • calcium carbonate (TUMS) chewable tab 500 mg  500 mg Oral DAILY Kenton Tovar M.D.   500 mg at 02/25/20 0558   • vitamin D (cholecalciferol) tablet 2,000 Units  2,000 Units Oral DAILY Kenton Tovar M.D.   2,000 Units at 02/25/20 0558   • promethazine (PHENERGAN) tablet 12.5-25 mg  12.5-25 mg Oral Q4HRS PRN Kenton Tovar M.D.       • oxyCODONE immediate release (ROXICODONE) tablet 10 mg  10 mg Oral Q3HRS PRN Kenton Tovar M.D.        And   • Pharmacy Consult Request ...Pain Management Review 1 Each  1 Each Other PHARMACY TO DOSE Kenton Tovar M.D.        And   • oxyCODONE immediate-release (ROXICODONE) tablet 5 mg  5 mg Oral Q3HRS PRN Kenton Tovar M.D.   5 mg at 02/22/20 2011    And   • morphine (pf) 4 MG/ML injection 4 mg  4 mg Intravenous Q3HRS PRN Kenton Tovar M.D.   4 mg at 02/24/20 2033   • ondansetron (ZOFRAN ODT) dispertab 4 mg  4 mg Oral Q4HRS PRN Kenton Tovar M.D.   Stopped at 02/20/20 0924   • magnesium hydroxide (MILK OF MAGNESIA) suspension 30 mL  30 mL Oral QDAY PRN Kenton Tovar M.D.        And   •  senna-docusate (PERICOLACE or SENOKOT S) 8.6-50 MG per tablet 2 Tab  2 Tab Oral BID Kenton Tovar M.D.   2 Tab at 02/25/20 0559    And   • polyethylene glycol/lytes (MIRALAX) PACKET 1 Packet  1 Packet Oral QDAY PRN Kenton Tovar M.D.   1 Packet at 02/20/20 1725    And   • bisacodyl (DULCOLAX) suppository 10 mg  10 mg Rectal QDAY PRN Kenton Tovar M.D.   10 mg at 02/21/20 1056   • hydrALAZINE (APRESOLINE) tablet 10 mg  10 mg Oral Q8HRS PRN Kenton Tovar M.D.       • guaiFENesin dextromethorphan (ROBITUSSIN DM) 100-10 MG/5ML syrup 10 mL  10 mL Oral Q6HRS PRN Kenton Tovar M.D.       • acetaminophen (TYLENOL) tablet 650 mg  650 mg Oral Q6HRS PRN Kenton Tovar M.D.       • ascorbic acid tablet 1,000 mg  1,000 mg Oral DAILY Kenton Tovar M.D.   1,000 mg at 02/25/20 0558   • MD Alert...ICU Electrolyte Replacement per Pharmacy   Other PHARMACY TO DOSE Kenton Tovar M.D.       • lidocaine (XYLOCAINE) 1 % injection 1-2 mL  1-2 mL Tracheal Tube Q30 MIN PRN Kenton Tovar M.D.       • labetalol (NORMODYNE/TRANDATE) injection 10 mg  10 mg Intravenous Q4HRS PRN Kenton Tovar M.D.   10 mg at 02/17/20 1805   • hydrALAZINE (APRESOLINE) injection 20 mg  20 mg Intravenous Q4HRS PRN Lazaro Benites M.D.   20 mg at 02/17/20 2023   • enoxaparin (LOVENOX) inj 40 mg  40 mg Subcutaneous DAILY Dino Sykes M.D.   40 mg at 02/25/20 0559   • ondansetron (ZOFRAN) syringe/vial injection 4 mg  4 mg Intravenous Q4HRS PRN Dino Sykes M.D.   4 mg at 02/25/20 0546   • promethazine (PHENERGAN) suppository 12.5-25 mg  12.5-25 mg Rectal Q4HRS PRN Dino Sykes M.D.       • prochlorperazine (COMPAZINE) injection 5-10 mg  5-10 mg Intravenous Q4HRS PRN Dino Sykes M.D.       • Respiratory Therapy Consult   Nebulization Continuous RT Dino Sykes M.D.       • ipratropium-albuterol (DUONEB) nebulizer solution  3 mL Nebulization Q4HRS (RT) Dino Sykes M.D.   3 mL at 02/24/20 8292    • ipratropium-albuterol (DUONEB) nebulizer solution  3 mL Nebulization Q2HRS PRN (RT) Dino Sykes M.D.           Fluids    Intake/Output Summary (Last 24 hours) at 2/25/2020 0702  Last data filed at 2/25/2020 0600  Gross per 24 hour   Intake 100 ml   Output 1600 ml   Net -1500 ml       Laboratory          Recent Labs     02/23/20  0440 02/24/20 0224 02/25/20  0130   SODIUM 135 132* 129*   POTASSIUM 4.6 4.6 4.5   CHLORIDE 94* 91* 86*   CO2 29 28 32   BUN 19 19 23*   CREATININE 0.59 0.59 0.68   MAGNESIUM 2.0  --  2.1   PHOSPHORUS 3.3 4.0 4.5   CALCIUM 8.7 9.2 9.2     Recent Labs     02/23/20 0440 02/24/20 0224 02/25/20  0130   PREALBUMIN  --   --  29.0   GLUCOSE 96 96 86     Recent Labs     02/23/20 0440 02/24/20 0224 02/25/20  0130   WBC 19.7* 22.1* 23.6*     Recent Labs     02/22/20  1545 02/23/20 0440 02/24/20 0224 02/25/20  0130   RBC  --  3.85* 4.35 4.40   HEMOGLOBIN  --  11.9* 13.0 13.3   HEMATOCRIT  --  35.3* 39.7 40.3   PLATELETCT  --  227 255 237   PROTHROMBTM 13.7  --   --   --    APTT 25.1  --   --   --    INR 1.03  --   --   --        Imaging  X-Ray:  I have personally reviewed the images and compared with prior images.    Assessment/Plan  * Acute respiratory failure with hypoxia (HCC)  Assessment & Plan  Intubated 2/17 for diagnostic workup -> extubated 2/18    Discussed with Dr Cate hernandez 2/17, Dr Longo 2/18  Progression of diffuse airspace opacities with chronic fibrotic changes in the background of emphysema   Found to have PJP pneumonia  Linezolid, Cefepimine d/c 2/21    Continue BiPAP -low threshold for intubation as nearing max settings  Patient would not want trach or chronic ventilator dependency   Solumedrol 1gr IV x3 days 2/17-2/19 -> changed to PJP prednisone equivalent   Start Flolan  If any further deterioration will require intubation      Lung cancer, primary, with metastasis from lung to other site (HCC)- (present on admission)  Assessment & Plan  Hx of squamous cell  lung CA (dx in 9/2019, in [facility] to lymph nodes   s/p chemoXRT (last 2 months ago)  recently received immunotherapy (durvalumab IV) 3 weeks prior to admission      Pneumocystis jiroveci pneumonia (HCC)  Assessment & Plan  Continue IV Bactrim monitor volume (2l per day) and diuretics to keep even and renal toxicity  Steroids for severe PCP pna  Prednisone 40mg BID x5 days  Prednisone 40mg QD x5 days  Prednisone 20mg QD x11 days  Or IV solumedrol equivalent since her stomach is upset continue pepcid tums for steroid induced gastritis    Leukocytosis  Assessment & Plan  Serial monitor steroid vs infectious       Pneumonia  Assessment & Plan  Currently treating broadly  Follow on up BAL RML, [facility], PJP DFA, silver stain, AFB, legionella and strep antigen, viral panel sent 2/20, Cocci, fungal, flu negative  ID following  Bactrim d/c 2/19, restarted 2/21 with + PJP    Found to have PJP pneumonia  Continue IV bactrim and steroids    Pulmonary fibrosis (HCC)  Assessment & Plan  Seen on CT 1/14/2020 and 2/15/202    Pulmonary HTN (HCC)- (present on admission)  Assessment & Plan  Prior echo with RVSP 45 related to chronic lung disease  Dry fluid volume as tolerated    Macrocytic anemia- (present on admission)  Assessment & Plan  Serial monitor hg restrictive transfusion hg < 7    COPD (chronic obstructive pulmonary disease) (HCC)- (present on admission)  Assessment & Plan  Background COPD with 2-3L home O2       VTE:  Lovenox  Ulcer: H2 Antagonist  Lines: Whyte Catheter  Ongoing indication addressed    I have performed a physical exam and reviewed and updated ROS and Plan today (2/25/2020). In review of yesterday's note (2/24/2020), there are no changes except as documented above.     Discussed patient condition and risk of morbidity and/or mortality with RN, RT, Pharmacy, Charge nurse / hot rounds, Patient and infectious disease     The patient remains critically ill.  Critical care time = 31 minutes in directly providing and  coordinating critical care and extensive data review.  No time overlap and excludes procedures.

## 2020-02-25 NOTE — PROGRESS NOTES
Per Dr. Valenzuela, ok to aspirate chest port that is locked with tpa. Aspirated and got blood return. Discarded 10cc of blood. Received the OK to use chest port.

## 2020-02-25 NOTE — CARE PLAN
Problem: Communication  Goal: The ability to communicate needs accurately and effectively will improve  Outcome: PROGRESSING AS EXPECTED  Note: Pt able to communicate needs accurately and effectively. Uses call light appropriately and waits for staff for assistance.      Problem: Mobility  Goal: Risk for activity intolerance will decrease  Outcome: PROGRESSING SLOWER THAN EXPECTED  Note: Patient unable to tolerate increased activity without desaturating to 60s/70s on bipap

## 2020-02-26 NOTE — PROGRESS NOTES
Progress note update    I was kindly asked by the bedside nurse to discuss pain management and CODE STATUS with the family.  The family including the patient stated they already decided earlier today they request CODE STATUS to be DNR/DNI.  Did not want intubation.  In fact their primary focus at this point is on comfort care.  Family request requested and the patient requested escalation of the morphine because of shortness of breath and anxiety that is not relieved by the current strategy.  Thus I have started a morphine infusion at 1 mg an hour which can be escalated as needed.  Have also continued by intermittent boluses of morphine.  Patient and the family are well aware that based on her current status her BiPAP her underlying disease process with her she may only live days and probably not weeks.  Patient and the family say they are most interested in quality of life and comfort and that is the first second and third goal.  So make the patient DNR/DNI.  I will defer to the day team whether shifting to comfort measures only is the strategy makes the most sense given her clinical status and preferences.

## 2020-02-26 NOTE — PROGRESS NOTES
Infectious Disease Progress Note    Author: Tana Calhoun M.D. Date & Time of service: 2/26/2020  10:43 AM    Chief Complaint:  Follow-up for acute on chronic hypoxic respiratory failure, pulmonary infiltrates    Interval History:  2/18 afebrile WBC 20.9 patient intubated yesterday secondary to worsening respiratory distress.  Multiple cultures obtained and are currently pending.  Patient is awake on the vent and communicating by writing.  She is breathing comfortably with plans for possible extubation today.  2/19 afebrile WBC 20.4 patient extubated yesterday.  BAL cultures negative to date.  Beta D glucan positive.  Currently on BiPAP.  Reports minimal cough  2/20 afebrile, WBC 18.1 BAL path- no malignancy.  GMS stain negative for fungal organisms.  Patient feels slightly better today.  She however desats off of BiPAP  2/21 afebrile WBC 16 patient now on high flow nasal cannula and breathing better.  Multiple PCP PCR is negative from bronchoscopy specimen.  We will resume Bactrim today.  Respiratory viral panel negative  2/22 T-max 99.9 WBC 16.7.  Patient is on BiPAP.  She states that her breathing is comfortable and she is not feeling as tired.  2/23 afebrile WBC 19.7.  Patient has remained on BiPAP overnight.  She feels about the same.  No new symptoms to report  2/24/2020-no fevers.  The WBC count  22,000.  Creatinine is 0.59 continues to remain short of breath.  2/25/2020-no fevers.  Continues to remain on BiPAP.  Continues to remain short of breath.  WBC is 23.6.  CRP is 2.27.  2/26/2020 no fevers.  Remains on BiPAP.  Being seen by palliative.  WBC count remains at 26,000.  Labs Reviewed, Medications Reviewed and Radiology Reviewed.    Review of Systems:  Review of Systems   Constitutional: Negative for chills and fever.   Respiratory: Positive for cough and shortness of breath.         Persistent   Cardiovascular: Negative for chest pain.   Gastrointestinal: Negative for abdominal pain, diarrhea, nausea  and vomiting.   Neurological: Negative for dizziness and headaches.   All other systems reviewed and are negative.       Hemodynamics:  No data recorded.  Monitored Temp: 37.1 °C (98.7 °F)  Pulse  Av.4  Min: 58  Max: 132   Blood Pressure: 145/63       Physical Exam:  Physical Exam  Constitutional:       Appearance: Normal appearance.   HENT:      Head:      Comments: BiPAP     Nose:      Comments: cortrak     Mouth/Throat:      Mouth: Mucous membranes are moist.   Eyes:      Extraocular Movements: Extraocular movements intact.      Conjunctiva/sclera: Conjunctivae normal.      Pupils: Pupils are equal, round, and reactive to light.   Neck:      Musculoskeletal: Normal range of motion and neck supple.   Cardiovascular:      Rate and Rhythm: Regular rhythm. Tachycardia present.      Comments: Right upper chest port in place-nontender, no surrounding erythema  Pulmonary:      Effort: Pulmonary effort is normal.      Breath sounds: Rales present.   Abdominal:      Palpations: Abdomen is soft.      Tenderness: There is no abdominal tenderness.   Musculoskeletal: Normal range of motion.      Right lower leg: Edema present.      Left lower leg: Edema present.   Skin:     General: Skin is warm and dry.   Neurological:      General: No focal deficit present.      Mental Status: She is alert and oriented to person, place, and time.      Cranial Nerves: No cranial nerve deficit.   Psychiatric:         Mood and Affect: Mood normal.         Behavior: Behavior normal.      Comments: Very pleasant         Meds:    Current Facility-Administered Medications:   •  ALPRAZolam  •  sulfamethoxazole-trimethoprim  •  magnesium sulfate  •  vitamin D  •  promethazine  •  methylPREDNISolone **FOLLOWED BY** [START ON 2020] predniSONE **FOLLOWED BY** [START ON 3/6/2020] predniSONE  •  Notify provider if pain remains uncontrolled **AND** Use the numeric rating scale (NRS-11) on regular floors and Critical-Care Pain Observation Tool  (CPOT) on ICUs/Trauma to assess pain **AND** Pulse Ox (Oximetry) **AND** Pharmacy Consult Request **AND** If patient difficult to arouse and/or has respiratory depression, stop any opiates that are currently infusing and call a Rapid Response. **AND** [DISCONTINUED] oxyCODONE immediate-release **AND** [DISCONTINUED] oxyCODONE immediate-release **AND** [DISCONTINUED] morphine injection  •  ondansetron  •  senna-docusate **AND** polyethylene glycol/lytes **AND** magnesium hydroxide **AND** bisacodyl  •  hydrALAZINE  •  guaiFENesin dextromethorphan  •  famotidine  •  calcium carbonate  •  ascorbic acid  •  acetaminophen  •  epoprostenol (FLOLAN) 1.5 mg syringe (For INHALATION)  •  morphine injection  •  morphine  •  furosemide  •  MD Alert...Adult ICU Electrolyte Replacement per Pharmacy  •  lidocaine  •  labetalol  •  hydrALAZINE  •  enoxaparin  •  ondansetron  •  promethazine  •  prochlorperazine  •  Respiratory Therapy Consult  •  ipratropium-albuterol  •  ipratropium-albuterol    Labs:  Recent Labs     02/24/20 0224 02/25/20 0130 02/26/20  0530   WBC 22.1* 23.6* 26.2*   RBC 4.35 4.40 4.01*   HEMOGLOBIN 13.0 13.3 12.2   HEMATOCRIT 39.7 40.3 36.6*   MCV 91.3 91.6 91.3   MCH 29.9 30.2 30.4   RDW 52.0* 52.0* 51.0*   PLATELETCT 255 237 195   MPV 9.2 8.9* 8.8*     Recent Labs     02/24/20 0224 02/25/20 0130 02/26/20  0530   SODIUM 132* 129* 123*   POTASSIUM 4.6 4.5 4.0   CHLORIDE 91* 86* 83*   CO2 28 32 31   GLUCOSE 96 86 86   BUN 19 23* 22     Recent Labs     02/24/20 0224 02/25/20 0130 02/26/20  0530   ALBUMIN 3.9 4.0 3.7   CREATININE 0.59 0.68 0.62       Imaging:  Dx-chest-portable (1 View)    Result Date: 2/18/2020 2/18/2020 3:10 AM HISTORY/REASON FOR EXAM: For indication of respiratory failure; For indication of respiratory failure TECHNIQUE/EXAM DESCRIPTION:  Single AP view of the chest. COMPARISON: Yesterday FINDINGS: Dobbhoff tube has been placed in the interim, terminating below the lower margin of the  film within the abdomen.  Otherwise medical device position is stable. The cardiac silhouette appears within normal limits. The mediastinal contour appears within normal limits.  The central  pulmonary vasculature appears prominent and indistinct. The lungs appear well expanded bilaterally.  Hazy interstitial bilateral pulmonary opacities are seen. No significant pleural effusions are identified. The bony structures appear age-appropriate.     1.  Pulmonary edema and/or infiltrates are identified, which are stable since the prior exam.    Dx-chest-portable (1 View)    Result Date: 2/17/2020 2/17/2020 6:02 PM HISTORY/REASON FOR EXAM:  POST INTUBATION. TECHNIQUE/EXAM DESCRIPTION AND NUMBER OF VIEWS: Single portable view of the chest. COMPARISON: 2/15/2020 FINDINGS: Endotracheal tube projects at the level of the clavicular heads. Right chest port projects over the SVC. The cardiomediastinal silhouette is stable. Interstitial and alveolar airspace opacities are again noted and increased on the right compared to prior. No definite pleural effusion or pneumothorax is identified.     Extensive interstitial and alveolar airspace opacities are increased on the right compared to prior. Findings may represent edema or multifocal pneumonia. Underlying mass is not excluded. Underlying emphysematous changes.    Dx-chest-portable (1 View)    Result Date: 2/15/2020  2/15/2020 11:50 AM HISTORY/REASON FOR EXAM: possible sepsis.. Shortness of breath. Lung carcinoma. TECHNIQUE/EXAM DESCRIPTION AND NUMBER OF VIEWS: Single AP view of the chest. COMPARISON: 11/15/2019 FINDINGS: Hardware: Right IJ portacatheter tip overlies the brachiocephalic confluence Lungs: Significant interval worsening consolidated opacity in the left lung with some volume loss noted. There is also worsening diffuse, bilateral reticular opacification Pleura:  Possible layering left pleural fluid Heart and mediastinum: Left heart border is no longer visualized,  completely effaced. No gross cardiac silhouette enlargement     Significant interval worsening left perihilar/lingular consolidation with possible underlying Mass. Significant worsening diffuse reticular abnormal opacity is consistent with underlying interstitial lung disease. Superimposed atypical infection or edema is possible New left lung volume loss    Ir-cvc Port Placement > Age 5    Result Date: 1/30/2020 1/30/2020 9:34 AM HISTORY/REASON FOR EXAM:  Patient requires central venous access for chemotherapy. TECHNIQUE/EXAM DESCRIPTION: Following informed consent patient was prepped and draped in the usual fashion.  A total of 14 cc of 1% lidocaine with epinephrine was utilized for local and subcutaneous anesthesia. SEDATION: 4 mg of Versed and 100 mcg of fentanyl were utilized for IV conscious sedation. The procedure was monitored continuously by the sedation nurse. 30 minutes of sedation time were utilized for the procedure. Fluoroscopy images: 3 fluoroscopic images obtained. Fluoroscopy time: 0.6 minutes The procedure was performed with MAXIMUM STERILE BARRIER TECHNIQUE including sterile gown, mask, cap, and done in a sterile gloves following appropriate hand hygiene and/or sterile scrub. The patient's skin site was prepped with 2% chlorhexidine solution. Ultrasound evaluation of the right internal jugular vein demonstrated patency and an image was archived in the PACS system. Utilizing ultrasonic and fluoroscopic guidance right internal jugular vein was accessed and an 8-Luxembourgish sheath dilator was positioned in the right internal jugular vein.  Next utilizing a combination of blunt and sharp dissection the port pocket was created in the right infraclavicular fossa.  The port was subsequently attached to the catheter and utilizing a tunneling device a subcutaneous tract was created to the neck puncture site.  The catheter was pulled through the tunnel and trimmed to length.  Next the catheter was advanced  through the peel-away sheath which was subsequently removed. The port pocket was closed with 4 deep 2-0 Vicryl sutures, the skin was closed Dermabond.  The  neck puncture site was closed with Dermabond and fluoroscopic images were obtained.  The patient tolerated procedure well. COMPARISON:  None FINDINGS: Fluoroscopic images revealed good positioning of the port in the right infraclavicular fossa.  There is a smooth arc of the catheter into the right internal jugular vein.  The distal tip of the catheter is at the caval atrial junction.     Successful percutaneous placement of Port-A-Cath via right internal jugular approach utilizing ultrasonic and fluoroscopic guidance.    Ct-cta Chest Pulmonary Artery W/ Recons    Result Date: 2/15/2020  2/15/2020 10:22 PM HISTORY/REASON FOR EXAM:  Shortness of breath TECHNIQUE/EXAM DESCRIPTION: CT angiogram scan for pulmonary embolism with contrast, with reconstructions. 1.25 mm helical sections were obtained from the lung apices through the lung bases following the rapid bolus administration of 55 mL of Omnipaque 350 nonionic contrast. Thin-section overlapping reconstruction interval was utilized. Coronal reconstructions were generated from the axial data. MIP post processing was performed and utilized for the interpretation. Low dose optimization technique was utilized for this CT exam including automated exposure control and adjustment of the mA and/or kV according to patient size. COMPARISON: 1/14/2020 FINDINGS: Pulmonary Embolism: No. Main Pulmonary Arteries: No. Segmental branches: No. Subsegmental branches: No. Additional Comments: None. Lungs: There are increased extensive multifocal airspace groundglass and interstitial opacities. There is underlying emphysema and interstitial lung disease. There is a redemonstrated stellate area of masslike scarring in the left lower lobe measuring 2.6 x 3.1 cm, essentially unchanged from previous exam. Pleura: No pleural effusion.  "Nodes: No enlarged lymph nodes. Additional findings: There is a small to moderate sized hiatal hernia. There is diffuse low-attenuation of the hepatic parenchyma consistent with steatosis.     1.  No evidence of pulmonary embolism. 2.  Increased extensive multifocal airspace groundglass and interstitial opacities. Findings could be consistent with edema and/or infection. 3.  Underlying emphysema and interstitial lung disease. 4.  Redemonstrated stellate area of masslike scarring in the left lower lobe measuring 2.6 x 3.1 cm, essentially unchanged from the previous exam and consistent with known malignancy. 5.  Small to moderate sized hiatal hernia. 6.  Hepatic steatosis.     Dx-abdomen For Tube Placement    Result Date: 2/17/2020 2/17/2020 8:42 PM HISTORY/REASON FOR EXAM: cortrak placement TECHNIQUE/EXAM DESCRIPTION:  Single AP view the abdomen. COMPARISON:  None FINDINGS: Hazy bilateral lower lobe opacities are seen. Dobbhoff tube is seen, the tip lies to the right of the lumbar spine.  The bowel gas pattern appears nonspecific. The bony structures appear age-appropriate.     1.  Nonspecific bowel gas pattern. 2.  Dobbhoff tube tip terminates overlying the expected location of the gastric antrum. 3.  Hazy bilateral lung base infiltrates or edema.      Micro:  Results     Procedure Component Value Units Date/Time    BLOOD CULTURE [[id number]] Collected:  02/15/20 1155    Order Status:  Completed Specimen:  Blood from Peripheral Updated:  02/20/20 1500     Significant Indicator NEG     Source BLD     Site PERIPHERAL     Culture Result No growth after 5 days of incubation.    Narrative:       Per Hospital Policy: Only change Specimen Src: to \"Line\" if  specified by physician order.  Right Wrist    BLOOD CULTURE [[id number]] Collected:  02/15/20 1245    Order Status:  Completed Specimen:  Blood from Peripheral Updated:  02/20/20 1500     Significant Indicator NEG     Source BLD     Site PERIPHERAL     Culture Result No " "growth after 5 days of incubation.    Narrative:       Per Hospital Policy: Only change Specimen Src: to \"Line\" if  specified by physician order.  Right AC          Assessment:  Active Hospital Problems    Diagnosis   • *Acute respiratory failure with hypoxia ([facility]) [J96.01]   • Lung cancer, primary, with metastasis from lung to other site ([facility]) [C34.90]   • COPD (chronic obstructive pulmonary disease) ([facility]) [J44.9]   • Pulmonary fibrosis ([facility]) [J84.10]   • Pneumonia [J18.9]       Plan:  Acute on chronic hypoxic respiratory failure, improved overall  2/2 below  Intubated on 2/17  Extubated on 2/18  Remains on BiPAP  Status post bronchoscopy with BAL on 2/17.  Patient was noted to have friable mucosa bilateral, bloody mucoid secretions.  Her respiratory status remains tenuous    Pneumonia/pulmonary opacities noted on imaging  Pneumocystis pneumonia  Patient was on high-dose steroids prior to admission so she is certainly at risk for opportunistic infections  PCP PCR from BAL - positive  Status post BAL on 2/17.  BAL cultures-negative to date  Serum galactomannan - negative  Cocci serology- negative  Fungitell - positive  Histo - negative  Continue Bactrim   Plan for 3 week course total with stop date of 3/13/2020 followed by PCP prophylaxis if patient remains on high dose steroids post treatment  On steroids  May be able to change to oral soon  DC'd oral linezolid, and IV cefepime on 2/21  Respiratory viral panel - negative    Leukocytosis, persistent.   Multifactorial (infection, high-dose steroids)  On antibiotics above  Monitor    Metastatic squamous cell lung cancer   Status post chemoradiation  Previously on immunotherapy    Pulmonary fibrosis  Patient was seen by pulmonologist, Dr. Longo prior to admission  Patient was on high-dose steroid taper prior to admission, started by oncology  On Solu-Medrol    COPD/chronic hypoxia  On 3LNC baseline     Prognosis  Guarded  I have performed a physical exam and reviewed " and updated ROS and plan today 2/26/2020.  In review of yesterday's note 2/25/2020, there are no changes except as documented above.    Discussed with intensivist, Dr. Valenzuela

## 2020-02-26 NOTE — DISCHARGE PLANNING
Care Transition Team Discharge Planning    Anticipated Discharge Disposition: d/c to hospice    Action: Lsw received competed hospice choice from RN in palliative, Dalia. She will discuss the d/c plan w/ the MD and acquire an order for hospice.    RN faxed the choice for [facility] hosp[facility] to [facility].     Barriers to Discharge: TBD    Plan: f/u w/ CCA, medical team, hospice

## 2020-02-26 NOTE — PALLIATIVE CARE
Palliative Care  PC RN visited pt at bedside, introduced self and role of PC. Pt reports her daughter should be at bedside for discussion, called her on her cell phone. [place] reports she is eating breakfast and will be back at bedside around 1000 and would like to discuss GOC at that time.    Active listening, education, validation, normalization, therapeutic touch, and emotional support provided.     Updated:   RT, Bedside RN    Plan:   Return around 1000 to discuss GOC with pt and daughter.       Thank you for allowing Palliative Care to participate in this patient's care. Please feel free to call [phone] with any questions or concerns.

## 2020-02-26 NOTE — CONSULTS
"Reason for PC Consult: Advance Care Planning    Consulted by:   Dr. Valenzuela    Assessment:  General:   64 year old female admitted for respiratory failure on 2/15/20. Pt has a history of squamous cell lung cancer with lymph metastasis, 2-3L home O2, COPD, emphysema, hiatus hernia, hypokalemia and jaundice. Pt presented to [facility] ED with worsening shortness of breath and hypoxia.     Dyspnea: Yes, 91% on 95% on BiPap  Last BM: 02/21/20    Pain: No   Depression: No    Dementia: No       Spiritual:  Is Restoration or spirituality important for coping with this illness? Yes, Pt requested hospital  for end of life support  Has a  or spiritual provider visit been requested? Yes    Palliative Performance Scale: 30%    Advance Directive: None on File   DPOA: None on File, RUBI Brooklyn Roldanryan ([phone])  POLST: None on File    Code Status: DNR/DNI      Outcome:  PC RN visited pt and daughter Brooklyn at bedside, introduced self and role of PC. Discussed their understanding of clinical picture, pt requested daughter provide all the history as she is too short of breath. Daughter verbalized understanding of cancer, reports that after her chemotherapy and radiation pt was started on immunotherapy but was then told she is too sick to continue any further treatments. What treatment pt did receive it was successful in \"shrinking the cancer by half\". Discussed COPD, neither pt nor Brooklyn understood the disease process. Discussed the progressive and terminal nature of COPD, educated both on the disease process and disease trajectory.     Discussed burdens and benefits of care, quality vs quantity of life and explored pt's beliefs and values about end of life. Discussed continued aggressive treatment vs comfort focused care, pt verbalized she wants to focus more on her comfort at this time. She does not feel she is going to outlive the disease and understands \"I can die at any time\". Facilitated discussed between pt " and daughter about thoughts and feelings about comfort focused care, Brooklyn verbalized understanding and support of pt's decision.    Discussed hospice at home, philosophy, goals and support provided. Discussed hospice at home vs SNF vs comfort care in the hospital. Discussed concern for pt's current oxygen needs and transportation. Both pt and Brooklyn wanted to discuss further but both verbalized agreement with sending a hospice referral to [facility] for evaluation of services.     Discussed code status, AD and POLST form. Pt confirmed DNAR/DNI at this time. Pt and Brooklyn would like to review AD packet and complete after they had some time to discuss GOC. PC RN provided them with an AD packet.    Pt requested a hospital  come visit pt at bedside, notified  services of request.     PC RN obtained hospice choice for [facility], faxed to [facility].    Provided contact card to both pt and Brooklyn, encouraged them to call with any questions or concerns.       Active listening, education, validation, normalization, therapeutic touch, and emotional support provided throughout encounter.    Updated:   Dr. Valenzuela, BRIANW,,     Plan:   Pt and family discussing GOC and comfort focused treatment, awaiting family arrival tomorrow. [facility] to evaluate pt for services.     Recommendations: I recommend a hospice consult.    Thank you for allowing Palliative Care to participate in this patient's care. Please feel free to call [phone] with any questions or concerns.

## 2020-02-26 NOTE — PROGRESS NOTES
Critical Care Progress Note    Date of admission  2/15/2020    Chief Complaint  65 yo female, former smoker 35pack year quit in 2012 with hx of squamous cell lung CA (dx in 9/2019, in [facility] mets to lymph nodes s/p chemoXRT (last 2 months ago), recently received immunotherapy (durvalumab IV) 3 weeks prior to admission), COPD (on 3L home O2), presented on 2/15 for worsening SOB x2-3 days. Pt was seen by pulmonary Dr. 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 Immunotherapy was held. She was started on high dose prednisone 80mg then taper in the past 3 weeks by Dr Callahan.      At admission, influenza A/B PCR is negative. pt was on oxymask 5L, sat >90% and progressively increasing to 8L oxymask. Afebrile, SBP in 100-110s. WBC 17K -12K. Creatinine 0.5, HCO3 26. Lactate 1.5. Procalcitonin 0.12. CT chest today showed increased extensive multifocal airspace ground glass and interstitial opacities. No PE. Started on solumedrol 125 Q6H. Pt initially started on unasyn, then changed to linezolid and cefepime. Pt was transferred to ICU for further evaluation Taken from Dr Christopher kan.     Hospital Course  Intubated [facility] 2/17 pulse dose steroids started   Extubated 2/18 to bipap and HFNC  2/19 still needing significant bipap PCP neg bactrim stopped  2/20 changed code status, still needing bipap and HFNC mild/minimal improvement in CXR  2/21 PJP +, restart bactrim, prednisone still needing bipap/HFNC with max setting offer if tires we can go on ventilator  2/22 spent most the day on bipap still spirits are high   2/23     Interval Problem Update  Reviewed last 24 hour events:  T-max 99.7  -1.3L over last 24 hours, -9.2L since admit  No CXR this AM  ->123    BAL 2/17+ for PCP  BC X 2/15 NGTD    Bactrim 2/16-P  Zyvox 2/16-21  Cefepime 2/16-21  Unasyn 2/15-16    Flolan continuous infusion and active titration  Morphine @1.5 mg/h  Lasix 20 twice daily  Solu-Medrol 40 mg every  12    BiPAP 12/8 and 100%  Last BM 2/21      Review of Systems  Review of Systems   Constitutional: Positive for malaise/fatigue. Negative for chills and fever.   HENT: Negative for congestion.    Eyes: Negative for blurred vision.   Respiratory: Positive for cough and shortness of breath. Negative for sputum production.    Cardiovascular: Negative for chest pain and palpitations.   Gastrointestinal: Negative for nausea and vomiting.   Musculoskeletal: Negative for myalgias.   Neurological: Positive for weakness. Negative for focal weakness.   Psychiatric/Behavioral: Negative for depression.   All other systems reviewed and are negative.       Vital Signs for last 24 hours   Pulse:  [] 82  Resp:  [12-41] 14  BP: (113-158)/(64-86) 141/71  SpO2:  [86 %-97 %] 93 %    Hemodynamic parameters for last 24 hours       Respiratory Information for the last 24 hours       Physical Exam   Physical Exam  Vitals signs and nursing note reviewed.   Constitutional:       General: She is in acute distress.      Appearance: She is ill-appearing.   HENT:      Head: Normocephalic and atraumatic.   Eyes:      Conjunctiva/sclera: Conjunctivae normal.      Pupils: Pupils are equal, round, and reactive to light.   Neck:      Musculoskeletal: Neck supple.   Cardiovascular:      Rate and Rhythm: Normal rate.      Pulses: Normal pulses.   Pulmonary:      Breath sounds: No wheezing or rales.      Comments: Diminished  Abdominal:      General: There is no distension.      Palpations: Abdomen is soft.      Tenderness: There is no abdominal tenderness.   Musculoskeletal:         General: No swelling.   Skin:     General: Skin is warm and dry.   Neurological:      Mental Status: She is alert.      Cranial Nerves: No cranial nerve deficit.   Psychiatric:         Mood and Affect: Mood normal.         Medications  Current Facility-Administered Medications   Medication Dose Route Frequency Provider Last Rate Last Dose   • ALPRAZolam (XANAX)  tablet 0.5 mg  0.5 mg Oral TID PRN Jeremy M Gonda, M.D.   0.5 mg at 02/26/20 0236   • sulfamethoxazole-trimethoprim (BACTRIM DS) 800-160 MG tablet 3 Tab  3 Tab Enteral Tube Q8HRS Tana Calhoun M.D.       • vitamin D (cholecalciferol) tablet 2,000 Units  2,000 Units Enteral Tube DAILY Kenton Valenzuela M.D.   2,000 Units at 02/26/20 0533   • promethazine (PHENERGAN) tablet 12.5-25 mg  12.5-25 mg Enteral Tube Q4HRS PRN Kenton Valenzuela M.D.       • [START ON 3/6/2020] predniSONE (DELTASONE) tablet 20 mg  20 mg Enteral Tube DAILY Kenton Valenzuela M.D.        Followed by   • methylPREDNISolone (SOLU-MEDROL) 40 MG injection 40 mg  40 mg Intravenous Q12HRS Kenton Valenzuela M.D.   40 mg at 02/26/20 0534    Followed by   • [START ON 2/28/2020] predniSONE (DELTASONE) tablet 40 mg  40 mg Enteral Tube DAILY Kenton Valenzuela M.D.       • Pharmacy Consult Request ...Pain Management Review 1 Each  1 Each Other PHARMACY TO DOSE Kenton Valenzuela M.D.       • ondansetron (ZOFRAN ODT) dispertab 4 mg  4 mg Enteral Tube Q4HRS PRN Kenton Valenzuela M.D.       • polyethylene glycol/lytes (MIRALAX) PACKET 1 Packet  1 Packet Enteral Tube QDAY PRN Kenton Valenzuela M.D.        And   • senna-docusate (PERICOLACE or SENOKOT S) 8.6-50 MG per tablet 2 Tab  2 Tab Enteral Tube BID Kenton Valenzuela M.D.   2 Tab at 02/26/20 0533    And   • magnesium hydroxide (MILK OF MAGNESIA) suspension 30 mL  30 mL Enteral Tube QDAY PRN Kenton Valenzuela M.D.        And   • bisacodyl (DULCOLAX) suppository 10 mg  10 mg Rectal QDAY PRN Kenton Valenzuela M.D.       • hydrALAZINE (APRESOLINE) tablet 10 mg  10 mg Enteral Tube Q8HRS PRN Kenton Valenzuela M.D.       • guaiFENesin dextromethorphan (ROBITUSSIN DM) 100-10 MG/5ML syrup 10 mL  10 mL Enteral Tube Q6HRS PRN Kenton Valenzuela M.D.       • famotidine (PEPCID) tablet 20 mg  20 mg Enteral Tube BID Kenton Valenzuela M.D.   20 mg at 02/26/20 0534   • calcium carbonate (TUMS) chewable tab 500 mg  500 mg Enteral Tube  DAILY Kenton Valenzuela M.D.   500 mg at 02/26/20 0533   • ascorbic acid tablet 1,000 mg  1,000 mg Enteral Tube DAILY Kenton Valenzuela M.D.   1,000 mg at 02/26/20 0533   • acetaminophen (TYLENOL) tablet 650 mg  650 mg Enteral Tube Q6HRS PRN Kenton Valenzuela M.D.       • epoprostenol (FLOLAN) 1.5 mg in glycine diluent for flolan 50 mL for Inhalation  0.01-0.05 mcg/kg/min Inhalation Continuous Kenton Valenzuela M.D. 5.178 mL/hr at 02/26/20 0358 0.03 mcg/kg/min at 02/26/20 0358   • morphine (pf) 4 MG/ML injection 2.5 mg  2.5 mg Intravenous Q4HRS PRN Kenton Valenzuela M.D.   2.5 mg at 02/25/20 1456   • morphine 1 mg/mL in 50 mL NS (Continuous Infusion)   Intravenous Continuous Fernie Tavarez M.D. 1.5 mL/hr at 02/26/20 0311 1.5 mg/hr at 02/26/20 0311   • furosemide (LASIX) injection 20 mg  20 mg Intravenous BID DIURETIC Kenton Tovar M.D.   20 mg at 02/26/20 0534   • MD Alert...ICU Electrolyte Replacement per Pharmacy   Other PHARMACY TO DOSE Kenton Tovar M.D.       • lidocaine (XYLOCAINE) 1 % injection 1-2 mL  1-2 mL Tracheal Tube Q30 MIN PRN Kenton Tovar M.D.       • labetalol (NORMODYNE/TRANDATE) injection 10 mg  10 mg Intravenous Q4HRS PRN Kenton Tovar M.D.   10 mg at 02/17/20 1805   • hydrALAZINE (APRESOLINE) injection 20 mg  20 mg Intravenous Q4HRS PRN Lazaro Benites M.D.   20 mg at 02/17/20 2023   • enoxaparin (LOVENOX) inj 40 mg  40 mg Subcutaneous DAILY Dino Sykes M.D.   40 mg at 02/26/20 0534   • ondansetron (ZOFRAN) syringe/vial injection 4 mg  4 mg Intravenous Q4HRS PRN Dino Sykes M.D.   4 mg at 02/25/20 0517   • promethazine (PHENERGAN) suppository 12.5-25 mg  12.5-25 mg Rectal Q4HRS PRN Dino Sykes M.D.       • prochlorperazine (COMPAZINE) injection 5-10 mg  5-10 mg Intravenous Q4HRS PRN Dino Sykes M.D.       • Respiratory Therapy Consult   Nebulization Continuous RT Dino Sykes M.D.       • ipratropium-albuterol (DUONEB) nebulizer solution  3 mL  Nebulization Q4HRS (RT) Dino Sykes M.D.   3 mL at 02/26/20 0656   • ipratropium-albuterol (DUONEB) nebulizer solution  3 mL Nebulization Q2HRS PRN (RT) Dino Sykes M.D.           Fluids    Intake/Output Summary (Last 24 hours) at 2/26/2020 0710  Last data filed at 2/26/2020 0600  Gross per 24 hour   Intake 210 ml   Output 1570 ml   Net -1360 ml       Laboratory          Recent Labs     02/24/20 0224 02/25/20 0130 02/26/20  0530   SODIUM 132* 129* 123*   POTASSIUM 4.6 4.5 4.0   CHLORIDE 91* 86* 83*   CO2 28 32 31   BUN 19 23* 22   CREATININE 0.59 0.68 0.62   MAGNESIUM  --  2.1 1.9   PHOSPHORUS 4.0 4.5 3.6   CALCIUM 9.2 9.2 8.9     Recent Labs     02/24/20 0224 02/25/20 0130 02/26/20  0530   PREALBUMIN  --  29.0 24.0   GLUCOSE 96 86 86     Recent Labs     02/24/20 0224 02/25/20  0130 02/26/20  0530   WBC 22.1* 23.6* 26.2*     Recent Labs     02/24/20 0224 02/25/20 0130 02/26/20  0530   RBC 4.35 4.40 4.01*   HEMOGLOBIN 13.0 13.3 12.2   HEMATOCRIT 39.7 40.3 36.6*   PLATELETCT 255 237 195       Imaging  X-Ray:  No film today    Assessment/Plan  * Acute respiratory failure with hypoxia (HCC)  Assessment & Plan  Intubated 2/17 for diagnostic workup -> extubated 2/18    Discussed with Dr Cate hernandez 2/17, Dr Longo 2/18  Progression of diffuse airspace opacities with chronic fibrotic changes in the background of emphysema   Found to have PJP pneumonia  Linezolid, Cefepimine d/c 2/21    Patient would not want trach or chronic ventilator dependency   Solumedrol 1gr IV x3 days 2/17-2/19 -> changed to PJP prednisone equivalent   Continue Flolan  Ongoing titration of BiPAP      Lung cancer, primary, with metastasis from lung to other site (HCC)- (present on admission)  Assessment & Plan  Hx of squamous cell lung CA (dx in 9/2019, in [facility] mets to lymph nodes   s/p chemoXRT (last 2 months ago)  recently received immunotherapy (durvalumab IV) 3 weeks prior to admission      Pneumocystis jiroveci pneumonia  (HCC)  Assessment & Plan  Continue IV Bactrim monitor volume (2l per day) and diuretics to keep even and renal toxicity  Steroids for severe PCP pna  Prednisone 40mg BID x5 days  Prednisone 40mg QD x5 days  Prednisone 20mg QD x11 days  Or IV solumedrol equivalent since her stomach is upset continue pepcid tums for steroid induced gastritis    Leukocytosis  Assessment & Plan  Serial monitor steroid vs infectious       Pneumonia  Assessment & Plan  Currently treating broadly  Follow on up BAL RML, LLL, PJP DFA, silver stain, AFB, legionella and strep antigen, viral panel sent 2/20, Cocci, fungal, flu negative  ID following  Bactrim d/c 2/19, restarted 2/21 with + PJP    Found to have PJP pneumonia  Continue IV bactrim and steroids    Pulmonary fibrosis (HCC)  Assessment & Plan  Seen on CT 1/14/2020 and 2/15/202    Pulmonary HTN (HCC)- (present on admission)  Assessment & Plan  Prior echo with RVSP 45 related to chronic lung disease  Dry fluid volume as tolerated    Macrocytic anemia- (present on admission)  Assessment & Plan  Serial monitor hg restrictive transfusion hg < 7    COPD (chronic obstructive pulmonary disease) (HCC)- (present on admission)  Assessment & Plan  Background COPD with 2-3L home O2       VTE:  Lovenox  Ulcer: H2 Antagonist  Lines: Whyte Catheter  Ongoing indication addressed    I have performed a physical exam and reviewed and updated ROS and Plan today (2/26/2020). In review of yesterday's note (2/25/2020), there are no changes except as documented above.     Discussed patient condition and risk of morbidity and/or mortality with RN, RT, Pharmacy, Charge nurse / hot rounds, Patient and infectious disease     The patient remains critically ill.  Critical care time = 33 minutes in directly providing and coordinating critical care and extensive data review.  No time overlap and excludes procedures.

## 2020-02-26 NOTE — PROGRESS NOTES
0930: MDR done with Dr. Valenzuela. Discussed pts plan of care, possibly transitioning to comfort care today? Dr. Valenzuela okay with having patient eat and lower Sp02 alarms when on HFNC eating, increase O2 if pt develops discomfort or distress.     1450: Discussed with patient risk for coming off oxygen to eat and patient and family both okay with this. Understands risks and still would like to eat on HFNC. Dr. Valenzuela again okay with this.

## 2020-02-26 NOTE — CARE PLAN
Problem: Communication  Goal: The ability to communicate needs accurately and effectively will improve  Outcome: PROGRESSING AS EXPECTED  Patient able to voice feelings and concerns, answer all questions presented to staff, educate on course of care and current plan in place.      Problem: Respiratory:  Goal: Respiratory status will improve  Outcome: PROGRESSING SLOWER THAN EXPECTED   Unable to wean from BiPap, high O2 requirements, intermittent nausea.  Encourage coughing and deep breathing, manage pain.

## 2020-02-27 NOTE — PROGRESS NOTES
Spiritual Care Note    Patient Information     Patient's Name: Michelle Billingsley   MRN: [id number]    YOB: 1955   Age and Gender: 64 y.o. female   Service Area: [facility]   Room (and Bed): T6/   Ethnicity or Nationality:     Primary Language: English   Evangelical/Spiritual preference: Bahai   Place of Residence: [place], [place]   Family/Friends/Others Present: Sister, Niece   Clinical Team Present: no   Medical Diagnosis(-es)/Procedure(s): Respiratory failure   Code Status: Prior    Date of Admission: 2/15/2020   Length of Stay: 12 days        Spiritual Care Provider Information:  Name of Spiritual Care Provider: Fallon Cam  Title of Spiritual Care Provider: Associate   Phone Number: [phone]  E-mail: [email or web address]  Total time : 25 minutes    Spiritual Screen Results:    Gen Nursing  Spiritual Screen  Is your spiritual health or inner well-being important to you as you cope with your medical condition?: No  Would you like to receive a visit from our Spiritual Care team or your own Evangelical or spiritual leader?: No  Was spiritual care education provided to the patient?: Declined     Palliative Care  PC Evangelical/Spiritual Screening  Is your spiritual health or inner well-being important to you as you cope with your medical condition?: Yes  Would you like to receive a visit from our Spiritual Care team or your own Evangelical or spiritual leader?: Yes  Was spiritual care education provided to the patient?: Declined  PC Sprituality screening comment: Pt requested hospital  for end of life support      Encounter/Request Information  Encounter/Request Type   Visited With: Patient and family together  Nature of the Visit: Initial, On shift  Crisis Visit: Critical care  Referral From/ Origin of Request: [place] management rounds    Religous Needs/Values  Evangelical Needs Visit  Evangelical Needs: Prayer  Ritual Needs Visit  Ritual Needs:  Lien(Ashes for Ash Wednesday)    Spiritual Assessment   Spiritual Care Encounters    Observations/Symptoms: (Pt alert, family BS)    Interacton/Conversation:  introduced self to pt and family.  was referred to pt by Palliative team and SW. Pt was delightful to speak with, as well as her sister and niece. Pt stated she would like ashes for the celebration of Ash Wednesday. Pt also spoke of wanting to be baptized, but wanted to talk more about it with family.  assured pt that whenever she decided she would like that ritual, that our spiritual care team would work with her regarding her desired day and time. Pt and family grateful for 's visit.    Assessment: Need  Need: Seeking Spiritual Assistance and Support, Joyce Issues    Interventions: Compassionate Presence, Reflective Listening, Spiritual ritual     Outcomes: Ability to Communicate with Truth and Honesty, Connectedness with the Holy/with God, Coping, Love/Belonging/ Compassion/Kindness/Acceptance, Spiritual Comfort, Value/Dignity/Respect    Plan: Visit Upon Request    Notes:

## 2020-02-27 NOTE — HOSPICE
ATTN: Provider/Care management team/Nurses    RE: Referral to [facility]    Thank you for the referral to [facility] for the patient listed above. We have made contact with the patient. To access [facility] documentation, click on Chart Review and then click onto Encounters (left top corner of screen).      If you have any questions or concerns regarding the patient’s transition to [facility], please do not hesitate to contact us at [phone].     We look forward to collaborating with you,  [facility] Care Team

## 2020-02-27 NOTE — DISCHARGE SUMMARY
Death Summary    Cause of Death  Acute respiratory failure due to pneumocystis jirovecii pneumonia due to immunocompromised due to chemotherapy.    Comorbid Conditions at the Time of Death  Principal Problem:    Acute respiratory failure with hypoxia (HCC) POA: Unknown  Active Problems:    Lung cancer, primary, with metastasis from lung to other site (HCC) POA: Yes    Pulmonary HTN (HCC) POA: Yes    Pulmonary fibrosis (HCC) POA: Unknown    Pneumonia POA: Unknown    Leukocytosis POA: Unknown    Pneumocystis jiroveci pneumonia (HCC) POA: Unknown    COPD (chronic obstructive pulmonary disease) (HCC) POA: Yes    Macrocytic anemia POA: Yes  Resolved Problems:    * No resolved hospital problems. *      History of Presenting Illness and Hospital Course  63 yo female, former smoker 35pack year quit in 2012 with hx of squamous cell lung CA (dx in 9/2019, in [facility] mets to lymph nodes s/p chemoXRT (last 2 months ago), recently received immunotherapy (durvalumab IV) 3 weeks prior to admission), COPD (on 3L home O2), presented on 2/15 for worsening SOB x2-3 days. Pt was seen by pulmonary Dr. 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 Immunotherapy was held. She was started on high dose prednisone 80mg then taper in the past 3 weeks by Dr Callahan.      At admission, influenza A/B PCR is negative. pt was on oxymask 5L, sat >90% and progressively increasing to 8L oxymask. Afebrile, SBP in 100-110s. WBC 17K -12K. Creatinine 0.5, HCO3 26. Lactate 1.5. Procalcitonin 0.12. CT chest today showed increased extensive multifocal airspace ground glass and interstitial opacities. No PE. Started on solumedrol 125 Q6H. Pt initially started on unasyn, then changed to linezolid and cefepime. Pt was transferred to ICU for further evaluation Taken from Dr Christopher kan.       See chart for full details to hospital course    Death Date: 02/27/20   Death Time: 0418         Pronounced By (RN1): Elissa  Julian  Pronounced By (RN2): Emelia Oneill

## 2020-03-18 LAB
FUNGUS SPEC CULT: NORMAL
SIGNIFICANT IND 70042: NORMAL
SITE SITE: NORMAL
SOURCE SOURCE: NORMAL

## 2020-04-14 LAB
MYCOBACTERIUM SPEC CULT: NORMAL
RHODAMINE-AURAMINE STN SPEC: NORMAL
SIGNIFICANT IND 70042: NORMAL
SITE SITE: NORMAL
SOURCE SOURCE: NORMAL